# Patient Record
Sex: FEMALE | Race: OTHER | HISPANIC OR LATINO | Employment: STUDENT | URBAN - METROPOLITAN AREA
[De-identification: names, ages, dates, MRNs, and addresses within clinical notes are randomized per-mention and may not be internally consistent; named-entity substitution may affect disease eponyms.]

---
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
## 2021-01-12 DIAGNOSIS — Z30.41 ENCOUNTER FOR SURVEILLANCE OF CONTRACEPTIVE PILLS: Primary | ICD-10-CM

## 2021-01-12 RX ORDER — NORGESTIMATE AND ETHINYL ESTRADIOL 0.25-0.035
1 KIT ORAL DAILY
Qty: 28 TABLET | Refills: 11 | Status: SHIPPED | OUTPATIENT
Start: 2021-01-12 | End: 2021-01-27 | Stop reason: SDUPTHER

## 2021-01-27 ENCOUNTER — OFFICE VISIT (OUTPATIENT)
Dept: OBGYN CLINIC | Facility: CLINIC | Age: 21
End: 2021-01-27

## 2021-01-27 VITALS
WEIGHT: 117 LBS | BODY MASS INDEX: 20.73 KG/M2 | HEART RATE: 76 BPM | SYSTOLIC BLOOD PRESSURE: 112 MMHG | DIASTOLIC BLOOD PRESSURE: 73 MMHG

## 2021-01-27 DIAGNOSIS — Z30.41 ENCOUNTER FOR SURVEILLANCE OF CONTRACEPTIVE PILLS: Primary | ICD-10-CM

## 2021-01-27 DIAGNOSIS — Z11.3 SCREEN FOR STD (SEXUALLY TRANSMITTED DISEASE): ICD-10-CM

## 2021-01-27 DIAGNOSIS — Z30.09 GENERAL COUNSELING AND ADVICE ON FEMALE CONTRACEPTION: ICD-10-CM

## 2021-01-27 PROCEDURE — 87491 CHLMYD TRACH DNA AMP PROBE: CPT | Performed by: NURSE PRACTITIONER

## 2021-01-27 PROCEDURE — 99213 OFFICE O/P EST LOW 20 MIN: CPT | Performed by: NURSE PRACTITIONER

## 2021-01-27 PROCEDURE — 87591 N.GONORRHOEAE DNA AMP PROB: CPT | Performed by: NURSE PRACTITIONER

## 2021-01-27 RX ORDER — NORGESTIMATE AND ETHINYL ESTRADIOL 0.25-0.035
1 KIT ORAL DAILY
Qty: 28 TABLET | Refills: 11 | Status: SHIPPED | OUTPATIENT
Start: 2021-01-27 | End: 2021-10-01 | Stop reason: SDUPTHER

## 2021-01-27 NOTE — PROGRESS NOTES
Assessment/Plan:         Diagnoses and all orders for this visit:    Encounter for surveillance of contraceptive pills  -     norgestimate-ethinyl estradiol (ORTHO-CYCLEN) 0 25-35 MG-MCG per tablet; Take 1 tablet by mouth daily    General counseling and advice on female contraception    Screen for STD (sexually transmitted disease)  -     Chlamydia/GC amplified DNA by PCR      Plan  Continue birth control pills as previously directed  STD results can take up to 2 weeks  Remember safe sex and condom use  Return in 1 year  Pt verbalized understanding of all discussed  Subjective:      Patient ID: Ketan Rush is a 24 y o  female  HPI   Pt presents to continue OCP's, happy with period, < 1 week, denies pain  1 partner x 3 years  Denies H/A      The following portions of the patient's history were reviewed and updated as appropriate: allergies, current medications, past family history, past medical history, past social history, past surgical history and problem list     Review of Systems      Pertinent items are note in the HPI    Objective:      /73   Pulse 76   Wt 53 1 kg (117 lb)   LMP 01/18/2021 (Within Days)   BMI 20 73 kg/m²          Physical Exam  Vitals signs reviewed  Constitutional:       Appearance: Normal appearance  Eyes:      General:         Right eye: No discharge  Left eye: No discharge  Neck:      Musculoskeletal: Normal range of motion  Pulmonary:      Effort: Pulmonary effort is normal  No respiratory distress  Musculoskeletal: Normal range of motion  Neurological:      Mental Status: She is alert and oriented to person, place, and time  Psychiatric:         Mood and Affect: Mood normal          Behavior: Behavior normal          Thought Content:  Thought content normal        Negative cough or SOB

## 2021-01-27 NOTE — LETTER
2021    To Rosanna Mcdaniel  : 2000      This letter is to advise you that your recent CULTURE results were reviewed by me and are NORMAL  Please contact the office for an appointment if you have any additional concerns      SIL Avilez

## 2021-01-27 NOTE — PATIENT INSTRUCTIONS
Continue birth control pills as previously directed  STD results can take up to 2 weeks  Remember safe sex and condom use  Return in 1 year    Birth Control Pills   WHAT YOU NEED TO KNOW:   Birth control pills are also called oral contraceptives, or the pill  It is medicine that helps prevent pregnancy  Birth control pills work by preventing ovulation  Ovulation is when the ovaries make and release an egg cell each month  If this egg gets fertilized by sperm, pregnancy occurs  Birth control pills may also help to prevent pregnancy by keeping sperm from fertilizing an egg  DISCHARGE INSTRUCTIONS:   Follow up with your healthcare provider as directed:  Write down your questions so you remember to ask them during your visits  Advantages of birth control pills:  When birth control pills are used correctly, the chances of getting pregnant are very low  Birth control pills may help decrease bleeding and pain during your monthly period  They may also help prevent cancer of the uterus and ovaries  Disadvantages of birth control pills: You may have sudden changes in your mood or feelings while you take birth control pills  You may have nausea and decreased sex drive  You may have an increased appetite and rapid weight gain  You may also have bleeding in between periods, less frequent periods, vaginal dryness, and breast pain  Birth control pills will not protect you from sexually transmitted infections  Rarely, some birth control pills can increase your risk for a blood clot  This may become life-threatening  If you want to get pregnant: If you are planning to have a baby, ask your healthcare provider when you may stop taking your birth control pills  It may take some time for you to start ovulating again  Ask your healthcare provider for more information about pregnancy after birth control pills  When to start taking birth control pills after you have a baby:   If you are not breastfeeding, you may start taking birth control pills 3 weeks after you give birth  You may be able to take certain types of birth control pills if you are breastfeeding  These pills can be started from 6 weeks to 6 months after you give birth  Ask your healthcare provider for more information about when to start taking birth control pills after you give birth  Contact your healthcare provider if:   · You have forgotten to take a birth control pill  · You have mood changes, such as depression, since starting birth control pills  · You have nausea or you are vomiting  · You have severe abdominal pain  · You missed a period and have questions or concerns about being pregnant  · You still have bleeding 4 months after taking birth control pills correctly  · You have questions or concerns about your condition or care  Return to the emergency department if:   · Your arm or leg feels warm, tender, and painful  It may look swollen and red  · You feel lightheaded, short of breath, and have chest pain  · You cough up blood  · You have any of the following signs of a stroke:      ¨ Numbness or drooping on one side of your face     ¨ Weakness in an arm or leg    ¨ Confusion or difficulty speaking    ¨ Dizziness, a severe headache, or vision loss    · You have severe pain, numbness, or swelling in your arms or legs  © 2017 [facility] Information is for End User's use only and may not be sold, redistributed or otherwise used for commercial purposes  All illustrations and images included in CareNotes® are the copyrighted property of A D A M , Inc  or Remington Hernandez  The above information is an  only  It is not intended as medical advice for individual conditions or treatments  Talk to your doctor, nurse or pharmacist before following any medical regimen to see if it is safe and effective for you  Missed or Late Pills:  For combined (Estrogen and Progestin) pills:    If one hormonal pill is LATE (<24 hours since a pill should have been taken): Take the late or missed pill as soon as possible  Continue taking the remaining pills at the usual time (even if it means taking two pills on the same day)  No additional contraceptive protection is needed  Emergency contraception is not usually needed but can be considered if hormonal pills were missed earlier in the cycle or in the last week of the previous cycle  If one hormonal pill has been MISSED (24 to <48 hours since a pill should have been taken): Take the late or missed pill as soon as possible  Continue taking the remaining pills at the usual time (even if it means taking two pills on the same day)  No additional contraceptive protection is needed  Emergency contraception is not usually needed but can be considered if hormonal pills were missed earlier in the cycle or in the last week of the previous cycle  If two or more consecutive hormonal pills have been missed: (less than or equal to 48 hours since a pill should have been taken): Take the most recent missed pill as soon as possible  (Any other missed pills should be discarded ) Continue taking the remaining pills at the usual time (even if it means taking two pills on the same day)  Use back-up contraception (e g , condoms) or avoid sexual intercourse until hormonal pills have been taken for 7 consecutive days  If pills were missed in the last week of hormonal pills (e g , days 15-21 for 28-day pill packs): Omit the hormone-free interval by finishing the horomal pills in the current pack and starting a new pack the next day  If unable to start a new pack immediately, use back-up contraception (e g , condoms) or avoid sexual intercourse until hormonal pills from a new pack have been taken for 7 consecutive days  Emergency contraception should be considered if hormonal pills were missed during the first week and unprotected sexual intercourse occurred in the previous 5 days   Emergency contraception may also be considered at other times as appropriate  Source: [place]  Selected Practice Recommendations for Contraceptive Use, 2013  COVID-19 Instructions    If you are having any of the following:  Cough   Shortness of breath   Fever  If traveled within past 2 weeks internationally or to high risk [place] states  Or been in contact with someone that has     Please call either:   Your PCP office  [phone], option [phone]    They will screen you over the phone and direct you to the nearest appropriate testing location    DO NOT go to your PCP or OB office without calling first

## 2021-01-28 LAB
C TRACH DNA SPEC QL NAA+PROBE: NEGATIVE
N GONORRHOEA DNA SPEC QL NAA+PROBE: NEGATIVE

## 2021-02-23 ENCOUNTER — OFFICE VISIT (OUTPATIENT)
Dept: FAMILY MEDICINE CLINIC | Facility: CLINIC | Age: 21
End: 2021-02-23

## 2021-02-23 VITALS
RESPIRATION RATE: 18 BRPM | BODY MASS INDEX: 21.25 KG/M2 | DIASTOLIC BLOOD PRESSURE: 70 MMHG | HEIGHT: 62 IN | OXYGEN SATURATION: 99 % | HEART RATE: 91 BPM | SYSTOLIC BLOOD PRESSURE: 102 MMHG | TEMPERATURE: 97.3 F | WEIGHT: 115.5 LBS

## 2021-02-23 DIAGNOSIS — Z23 ENCOUNTER FOR IMMUNIZATION: ICD-10-CM

## 2021-02-23 DIAGNOSIS — F41.9 ANXIETY: ICD-10-CM

## 2021-02-23 DIAGNOSIS — G43.909 MIGRAINE WITHOUT STATUS MIGRAINOSUS, NOT INTRACTABLE, UNSPECIFIED MIGRAINE TYPE: Primary | ICD-10-CM

## 2021-02-23 DIAGNOSIS — Z00.00 HEALTHCARE MAINTENANCE: ICD-10-CM

## 2021-02-23 DIAGNOSIS — Z00.00 ANNUAL PHYSICAL EXAM: ICD-10-CM

## 2021-02-23 DIAGNOSIS — R62.7 POOR WEIGHT GAIN IN ADULT: ICD-10-CM

## 2021-02-23 PROBLEM — R51.9 HEADACHE: Status: ACTIVE | Noted: 2020-03-25

## 2021-02-23 PROCEDURE — 90471 IMMUNIZATION ADMIN: CPT

## 2021-02-23 PROCEDURE — 3008F BODY MASS INDEX DOCD: CPT | Performed by: NURSE PRACTITIONER

## 2021-02-23 PROCEDURE — 99385 PREV VISIT NEW AGE 18-39: CPT | Performed by: NURSE PRACTITIONER

## 2021-02-23 PROCEDURE — 1036F TOBACCO NON-USER: CPT | Performed by: NURSE PRACTITIONER

## 2021-02-23 PROCEDURE — 90632 HEPA VACCINE ADULT IM: CPT

## 2021-02-23 RX ORDER — MIRTAZAPINE 7.5 MG/1
7.5 TABLET, FILM COATED ORAL
Qty: 30 TABLET | Refills: 5 | Status: SHIPPED | OUTPATIENT
Start: 2021-02-23 | End: 2021-09-01

## 2021-02-23 NOTE — PATIENT INSTRUCTIONS
Wellness Visit for Adults   AMBULATORY CARE:   A wellness visit  is when you see your healthcare provider to get screened for health problems  Your healthcare provider will also give you advice on how to stay healthy  Write down your questions so you remember to ask them  Ask your healthcare provider how often you should have a wellness visit  What happens at a wellness visit:  Your healthcare provider will ask about your health, and your family history of health problems  This includes high blood pressure, heart disease, and cancer  He or she will ask if you have symptoms that concern you, if you smoke, and about your mood  You may also be asked about your intake of medicines, supplements, food, and alcohol  Any of the following may be done:  · Your weight  will be checked  Your height may also be checked so your body mass index (BMI) can be calculated  Your BMI shows if you are at a healthy weight  · Your blood pressure  and heart rate will be checked  Your temperature may also be checked  · Blood and urine tests  may be done  Blood tests may be done to check your cholesterol levels  Abnormal cholesterol levels increase your risk for heart disease and stroke  You may also need a blood or urine test to check for diabetes if you are at increased risk  Urine tests may be done to look for signs of an infection or kidney disease  · A physical exam  includes checking your heartbeat and lungs with a stethoscope  Your healthcare provider may also check your skin to look for sun damage  · Screening tests  may be recommended  A screening test is done to check for diseases that may not cause symptoms  The screening tests you may need depend on your age, gender, family history, and lifestyle habits  For example, colorectal screening may be recommended if you are 48years old or older  Screening tests you need if you are a woman:   · A Pap smear  is used to screen for cervical cancer   Pap smears are usually done every 3 to 5 years depending on your age  You may need them more often if you have had abnormal Pap smear test results in the past  Ask your healthcare provider how often you should have a Pap smear  · A mammogram  is an x-ray of your breasts to screen for breast cancer  Experts recommend mammograms every 2 years starting at age 48 years  You may need a mammogram at age 52 years or younger if you have an increased risk for breast cancer  Talk to your healthcare provider about when you should start having mammograms and how often you need them  Vaccines you may need:   · Get an influenza vaccine  every year  The influenza vaccine protects you from the flu  Several types of viruses cause the flu  The viruses change over time, so new vaccines are made each year  · Get a tetanus-diphtheria (Td) booster vaccine  every 10 years  This vaccine protects you against tetanus and diphtheria  Tetanus is a severe infection that may cause painful muscle spasms and lockjaw  Diphtheria is a severe bacterial infection that causes a thick covering in the back of your mouth and throat  · Get a human papillomavirus (HPV) vaccine  if you are female and aged 23 to 32 or male 23 to 24 and never received it  This vaccine protects you from HPV infection  HPV is the most common infection spread by sexual contact  HPV may also cause vaginal, penile, and anal cancers  · Get a pneumococcal vaccine  if you are aged 72 years or older  The pneumococcal vaccine is an injection given to protect you from pneumococcal disease  Pneumococcal disease is an infection caused by pneumococcal bacteria  The infection may cause pneumonia, meningitis, or an ear infection  · Get a shingles vaccine  if you are 60 or older, even if you have had shingles before  The shingles vaccine is an injection to protect you from the varicella-zoster virus  This is the same virus that causes chickenpox   Shingles is a painful rash that develops in people who had chickenpox or have been exposed to the virus  How to eat healthy:  My Plate is a model for planning healthy meals  It shows the types and amounts of foods that should go on your plate  Fruits and vegetables make up about half of your plate, and grains and protein make up the other half  A serving of dairy is included on the side of your plate  The amount of calories and serving sizes you need depends on your age, gender, weight, and height  Examples of healthy foods are listed below:  · Eat a variety of vegetables  such as dark green, red, and orange vegetables  You can also include canned vegetables low in sodium (salt) and frozen vegetables without added butter or sauces  · Eat a variety of fresh fruits , canned fruit in 100% juice, frozen fruit, and dried fruit  · Include whole grains  At least half of the grains you eat should be whole grains  Examples include whole-wheat bread, wheat pasta, brown rice, and whole-grain cereals such as oatmeal     · Eat a variety of protein foods such as seafood (fish and shellfish), lean meat, and poultry without skin (turkey and chicken)  Examples of lean meats include pork leg, shoulder, or tenderloin, and beef round, sirloin, tenderloin, and extra lean ground beef  Other protein foods include eggs and egg substitutes, beans, peas, soy products, nuts, and seeds  · Choose low-fat dairy products such as skim or 1% milk or low-fat yogurt, cheese, and cottage cheese  · Limit unhealthy fats  such as butter, hard margarine, and shortening  Exercise:  Exercise at least 30 minutes per day on most days of the week  Some examples of exercise include walking, biking, dancing, and swimming  You can also fit in more physical activity by taking the stairs instead of the elevator or parking farther away from stores  Include muscle strengthening activities 2 days each week  Regular exercise provides many health benefits   It helps you manage your weight, and decreases your risk for type 2 diabetes, heart disease, stroke, and high blood pressure  Exercise can also help improve your mood  Ask your healthcare provider about the best exercise plan for you  General health and safety guidelines:   · Do not smoke  Nicotine and other chemicals in cigarettes and cigars can cause lung damage  Ask your healthcare provider for information if you currently smoke and need help to quit  E-cigarettes or smokeless tobacco still contain nicotine  Talk to your healthcare provider before you use these products  · Limit alcohol  A drink of alcohol is 12 ounces of beer, 5 ounces of wine, or 1½ ounces of liquor  · Lose weight, if needed  Being overweight increases your risk of certain health conditions  These include heart disease, high blood pressure, type 2 diabetes, and certain types of cancer  · Protect your skin  Do not sunbathe or use tanning beds  Use sunscreen with a SPF 15 or higher  Apply sunscreen at least 15 minutes before you go outside  Reapply sunscreen every 2 hours  Wear protective clothing, hats, and sunglasses when you are outside  · Drive safely  Always wear your seatbelt  Make sure everyone in your car wears a seatbelt  A seatbelt can save your life if you are in an accident  Do not use your cell phone when you are driving  This could distract you and cause an accident  Pull over if you need to make a call or send a text message  · Practice safe sex  Use latex condoms if are sexually active and have more than one partner  Your healthcare provider may recommend screening tests for sexually transmitted infections (STIs)  · Wear helmets, lifejackets, and protective gear  Always wear a helmet when you ride a bike or motorcycle, go skiing, or play sports that could cause a head injury  Wear protective equipment when you play sports  Wear a lifejacket when you are on a boat or doing water sports      © Copyright [employer] 2020 Information is for End User's use only and may not be sold, redistributed or otherwise used for commercial purposes  All illustrations and images included in CareNotes® are the copyrighted property of A D A M , Inc  or Cali Dawkins  The above information is an  only  It is not intended as medical advice for individual conditions or treatments  Talk to your doctor, nurse or pharmacist before following any medical regimen to see if it is safe and effective for you  Acute Headache   WHAT YOU NEED TO KNOW:   An acute headache is pain or discomfort that starts suddenly and gets worse quickly  You may have an acute headache only when you feel stress or eat certain foods  Other acute headache pain can happen every day, and sometimes several times a day  DISCHARGE INSTRUCTIONS:   Return to the emergency department if:   · You have severe pain  · You have numbness or weakness on one side of your face or body  · You have a headache that occurs after a blow to the head, a fall, or other trauma  · You have a headache, are forgetful or confused, or have trouble speaking  · You have a headache, stiff neck, and a fever  Contact your healthcare provider if:   · You have a constant headache and are vomiting  · You have a headache each day that does not get better, even after treatment  · You have changes in your headaches, or new symptoms that occur when you have a headache  · You have questions or concerns about your condition or care  Medicines: You may need any of the following:  · Prescription pain medicine  may be given  The medicine your healthcare provider recommends will depend on the kind of headaches you have  You will need to take prescription headache medicines as directed to prevent a problem called rebound headache  These headaches happen with regular use of pain relievers for headache disorders  · NSAIDs , such as ibuprofen, help decrease swelling, pain, and fever   This medicine is available with or without a doctor's order  NSAIDs can cause stomach bleeding or kidney problems in certain people  If you take blood thinner medicine, always ask your healthcare provider if NSAIDs are safe for you  Always read the medicine label and follow directions  · Acetaminophen  decreases pain and fever  It is available without a doctor's order  Ask how much to take and how often to take it  Follow directions  Read the labels of all other medicines you are using to see if they also contain acetaminophen, or ask your doctor or pharmacist  Acetaminophen can cause liver damage if not taken correctly  Do not use more than 3 grams (3,000 milligrams) total of acetaminophen in one day  · Antidepressants  may be given for some kinds of headaches  · Take your medicine as directed  Contact your healthcare provider if you think your medicine is not helping or if you have side effects  Tell him or her if you are allergic to any medicine  Keep a list of the medicines, vitamins, and herbs you take  Include the amounts, and when and why you take them  Bring the list or the pill bottles to follow-up visits  Carry your medicine list with you in case of an emergency  Manage your symptoms:   · Apply heat or ice  on the headache area  Use a heat or ice pack  For an ice pack, you can also put crushed ice in a plastic bag  Cover the pack or bag with a towel before you apply it to your skin  Ice and heat both help decrease pain, and heat also helps decrease muscle spasms  Apply heat for 20 to 30 minutes every 2 hours  Apply ice for 15 to 20 minutes every hour  Apply heat or ice for as long and for as many days as directed  You may alternate heat and ice  · Relax your muscles  Lie down in a comfortable position and close your eyes  Relax your muscles slowly  Start at your toes and work your way up your body  · Keep a record of your headaches  Write down when your headaches start and stop   Include your symptoms and what you were doing when the headache began  Record what you ate or drank for 24 hours before the headache started  Describe the pain and where it hurts  Keep track of what you did to treat your headache and if it worked  Prevent an acute headache:   · Avoid anything that triggers an acute headache  Examples include exposure to chemicals, going to high altitude, or not getting enough sleep  Create a regular sleep routine  Go to sleep at the same time and wake up at the same time each day  Do not use electronic devices before bedtime  These may trigger a headache or prevent you from sleeping well  · Do not smoke  Nicotine and other chemicals in cigarettes and cigars can trigger an acute headache or make it worse  Ask your healthcare provider for information if you currently smoke and need help to quit  E-cigarettes or smokeless tobacco still contain nicotine  Talk to your healthcare provider before you use these products  · Limit alcohol as directed  Alcohol can trigger an acute headache or make it worse  If you have cluster headaches, do not drink alcohol during an episode  For other types of headaches, ask your healthcare provider if it is safe for you to drink alcohol  Ask how much is safe for you to drink, and how often  · Exercise as directed  Exercise can reduce tension and help with headache pain  Aim for 30 minutes of physical activity on most days of the week  Your healthcare provider can help you create an exercise plan  · Eat a variety of healthy foods  Healthy foods include fruits, vegetables, low-fat dairy products, lean meats, fish, whole grains, and cooked beans  Your healthcare provider or dietitian can help you create meals plans if you need to avoid foods that trigger headaches  Follow up with your healthcare provider as directed:  Bring your headache record with you when you see your healthcare provider   Write down your questions so you remember to ask them during your visits  © Copyright [address] Information is for End User's use only and may not be sold, redistributed or otherwise used for commercial purposes  All illustrations and images included in CareNotes® are the copyrighted property of A D A M [employer] Inc  or Cali Dawkins  The above information is an  only  It is not intended as medical advice for individual conditions or treatments  Talk to your doctor, nurse or pharmacist before following any medical regimen to see if it is safe and effective for you

## 2021-02-23 NOTE — ASSESSMENT & PLAN NOTE
Stable at this time per patient but periods of anxiety have reemerged with fear of nightly headaches  Mirtazapine for headaches co-therapeutic for anxiety  Will f/u in 4 weeks and reassess

## 2021-02-23 NOTE — ASSESSMENT & PLAN NOTE
Unilateral, no aura, periods of remission and pain 10/10 all seem to point to cluster headaches  But migraine and tension type headaches cannot be ruled out at this time  Trial of second line Mirtazapine  If fails, referral to Neuro and may consider MRI

## 2021-02-23 NOTE — PROGRESS NOTES
ADULT ANNUAL PHYSICAL  [facility]    NAME: Tio Alba  AGE: 24 y o  SEX: female  : 2000     DATE: 2021     Assessment and Plan:     Problem List Items Addressed This Visit        Other    Anxiety     Stable at this time per patient but periods of anxiety have reemerged with fear of nightly headaches  Mirtazapine for headaches co-therapeutic for anxiety  Will f/u in 4 weeks and reassess  Headache - Primary     Unilateral, no aura, periods of remission and pain 10/10 all seem to point to cluster headaches  But migraine and tension type headaches cannot be ruled out at this time  Trial of second line Mirtazapine  If fails, referral to Neuro and may consider MRI  Relevant Medications    mirtazapine (REMERON) 7 5 MG tablet    Poor weight gain in adult     Pt states very active metabolism but irritable bowel  Declines referral to GI at this time but prefers to work with nutritionist on diet  Denies syncope, intentional anorexia, fam hist of thyroid disease  Check labs for anemia and TSH  Relevant Orders    Ambulatory referral to Nutrition Services      Other Visit Diagnoses     Encounter for immunization        Relevant Orders    HEPATITIS A VACCINE ADULT IM (Completed)    Healthcare maintenance        Relevant Orders    Basic metabolic panel    CBC and Platelet    Vitamin D 25 hydroxy    TSH, 3rd generation with Free T4 reflex    Annual physical exam              Immunizations and preventive care screenings were discussed with patient today  Appropriate education was printed on patient's after visit summary  Counseling:  Alcohol/drug use: discussed moderation in alcohol intake, the recommendations for healthy alcohol use, and avoidance of illicit drug use  Dental Health: discussed importance of regular tooth brushing, flossing, and dental visits    Injury prevention: discussed safety/seat belts, safety helmets, smoke detectors, carbon dioxide detectors, and smoking near bedding or upholstery  Sexual health: discussed sexually transmitted diseases, partner selection, use of condoms, avoidance of unintended pregnancy, and contraceptive alternatives  · Exercise: the importance of regular exercise/physical activity was discussed  Recommend exercise 3-5 times per week for at least 30 minutes  Return in about 4 weeks (around 3/23/2021) for Recheck headaches  Chief Complaint:     Chief Complaint   Patient presents with    New Patient Visit      History of Present Illness:     Adult Annual Physical   Patient here for a comprehensive physical exam And to establish as a new patient at our practice  Actually follows with OBGYN at [facility] upstairs and currently takes oral contraceptives       Her chief complaint is chronic headaches  She describes these as unilateral   Denies aura, visual disturbances, nausea, vomiting, or syncope  On a scale of 0-10 the pain is a 10  She was on amitriptyline but has not taken this for over 1 year as she states this did not help  Last March she received the 1st in a 2 dose series of hep a and she would like to complete the series today  Her depression screening is negative  Her other complaint is inability to gain weight  She denies family history of thyroid disorder  She denies heat intolerance  She states she has a healthy appetite however she believes she may have IBS or lactose intolerance  She does states she has a history of anxiety but does not currently take medication or receive psychotherapy  She has no other complaints at this time  Diet and Physical Activity  · Diet/Nutrition: well balanced diet  · Exercise: no formal exercise  Depression Screening  PHQ-9 Depression Screening    PHQ-9:   Frequency of the following problems over the past two weeks:           General Health  · Sleep: sleeps well     · Hearing: normal - bilateral   · Vision: no vision problems and wears glasses  · Dental: regular dental visits  /GYN Health  · Contraceptive method: oral contraceptives  · History of STDs?: yes  Review of Systems:     Review of Systems   Constitutional: Negative for appetite change, chills, fever and unexpected weight change  HENT: Negative for congestion, dental problem, ear pain, mouth sores, nosebleeds, postnasal drip, rhinorrhea, sore throat and trouble swallowing  Eyes: Negative for pain and visual disturbance  Respiratory: Negative for cough and shortness of breath  Cardiovascular: Negative for chest pain and palpitations  Gastrointestinal: Negative for abdominal pain, blood in stool, constipation, diarrhea, nausea and vomiting  Genitourinary: Negative for dysuria and hematuria  Musculoskeletal: Negative for arthralgias and back pain  Skin: Negative for color change and rash  Neurological: Positive for headaches  Negative for dizziness, seizures, syncope, facial asymmetry and light-headedness  Psychiatric/Behavioral: The patient is nervous/anxious  All other systems reviewed and are negative  Past Medical History:     Past Medical History:   Diagnosis Date    ADHD (attention deficit hyperactivity disorder)     last seen 1 year ago, no recent RX, was on adderal, off at least 1 year     Migraine     OCD (obsessive compulsive disorder)     no treatment      Past Surgical History:     History reviewed  No pertinent surgical history     Social History:        Social History     Socioeconomic History    Marital status: Single     Spouse name: None    Number of children: None    Years of education: None    Highest education level: None   Occupational History    None   Social Needs    Financial resource strain: Not hard at all   Rafita-Stew insecurity     Worry: Never true     Inability: Never true    Transportation needs     Medical: No     Non-medical: No   Tobacco Use    Smoking status: Never Smoker    Smokeless tobacco: Never Used   Substance and Sexual Activity    Alcohol use: No    Drug use: Not Currently     Types: Marijuana    Sexual activity: Yes     Birth control/protection: Pill     Comment: patient's cell [phone]; lives with Mom, may stay at boyfriends house on [place]  Lifestyle    Physical activity     Days per week: None     Minutes per session: None    Stress: None   Relationships    Social connections     Talks on phone: None     Gets together: None     Attends Buddhism service: None     Active member of club or organization: None     Attends meetings of clubs or organizations: None     Relationship status: None    Intimate partner violence     Fear of current or ex partner: None     Emotionally abused: None     Physically abused: None     Forced sexual activity: None   Other Topics Concern    None   Social History Narrative    Lives with mother and sister  Family History:     Family History   Problem Relation Age of Onset   Vy Aldrich Migraines Mother    Vy Aldrich ADD / ADHD Father     Heart disease Maternal Grandmother     Diabetes Maternal Grandmother       Current Medications:     Current Outpatient Medications   Medication Sig Dispense Refill    norgestimate-ethinyl estradiol (ORTHO-CYCLEN) 0 25-35 MG-MCG per tablet Take 1 tablet by mouth daily 28 tablet 11    mirtazapine (REMERON) 7 5 MG tablet Take 1 tablet (7 5 mg total) by mouth daily at bedtime 30 tablet 5     No current facility-administered medications for this visit  Allergies:     No Known Allergies   Physical Exam:     /70 (BP Location: Left arm, Patient Position: Sitting, Cuff Size: Standard)   Pulse 91   Temp (!) 97 3 °F (36 3 °C) (Temporal)   Resp 18   Ht 5' 2" (1 575 m)   Wt 52 4 kg (115 lb 8 oz)   LMP 02/18/2021   SpO2 99%   Breastfeeding No   BMI 21 13 kg/m²     Physical Exam  Vitals signs and nursing note reviewed     Constitutional:       General: She is not in acute distress  Appearance: Normal appearance  She is well-developed and normal weight  HENT:      Head: Normocephalic and atraumatic  Right Ear: Tympanic membrane, ear canal and external ear normal  There is no impacted cerumen  Left Ear: Tympanic membrane, ear canal and external ear normal  There is no impacted cerumen  Nose: Nose normal  No congestion  Mouth/Throat:      Mouth: Mucous membranes are moist       Pharynx: No posterior oropharyngeal erythema  Eyes:      Extraocular Movements: Extraocular movements intact  Conjunctiva/sclera: Conjunctivae normal       Pupils: Pupils are equal, round, and reactive to light  Neck:      Musculoskeletal: Normal range of motion and neck supple  Cardiovascular:      Rate and Rhythm: Normal rate and regular rhythm  Pulses: Normal pulses  Heart sounds: Normal heart sounds  No murmur  Pulmonary:      Effort: Pulmonary effort is normal  No respiratory distress  Breath sounds: Normal breath sounds  Abdominal:      General: Abdomen is flat  Bowel sounds are normal       Palpations: Abdomen is soft  Tenderness: There is no abdominal tenderness  Musculoskeletal: Normal range of motion  Skin:     General: Skin is warm and dry  Neurological:      General: No focal deficit present  Mental Status: She is alert and oriented to person, place, and time  Psychiatric:         Mood and Affect: Mood normal          Behavior: Behavior normal          Thought Content:  Thought content normal           Damari Woo 35

## 2021-02-23 NOTE — ASSESSMENT & PLAN NOTE
Pt states very active metabolism but irritable bowel  Declines referral to GI at this time but prefers to work with nutritionist on diet  Denies syncope, intentional anorexia, fam hist of thyroid disease  Check labs for anemia and TSH

## 2021-04-27 ENCOUNTER — IMMUNIZATIONS (OUTPATIENT)
Dept: FAMILY MEDICINE CLINIC | Facility: HOSPITAL | Age: 21
End: 2021-04-27

## 2021-04-27 DIAGNOSIS — Z23 ENCOUNTER FOR IMMUNIZATION: Primary | ICD-10-CM

## 2021-04-27 PROCEDURE — 0011A SARS-COV-2 / COVID-19 MRNA VACCINE (MODERNA) 100 MCG: CPT

## 2021-04-27 PROCEDURE — 91301 SARS-COV-2 / COVID-19 MRNA VACCINE (MODERNA) 100 MCG: CPT

## 2021-05-25 ENCOUNTER — IMMUNIZATIONS (OUTPATIENT)
Dept: FAMILY MEDICINE CLINIC | Facility: HOSPITAL | Age: 21
End: 2021-05-25

## 2021-05-25 DIAGNOSIS — Z23 ENCOUNTER FOR IMMUNIZATION: Primary | ICD-10-CM

## 2021-05-25 PROCEDURE — 0012A SARS-COV-2 / COVID-19 MRNA VACCINE (MODERNA) 100 MCG: CPT

## 2021-05-25 PROCEDURE — 91301 SARS-COV-2 / COVID-19 MRNA VACCINE (MODERNA) 100 MCG: CPT

## 2021-08-20 ENCOUNTER — OFFICE VISIT (OUTPATIENT)
Dept: FAMILY MEDICINE CLINIC | Facility: CLINIC | Age: 21
End: 2021-08-20

## 2021-08-20 ENCOUNTER — APPOINTMENT (OUTPATIENT)
Dept: LAB | Facility: HOSPITAL | Age: 21
End: 2021-08-20
Payer: COMMERCIAL

## 2021-08-20 DIAGNOSIS — Z20.822 EXPOSURE TO COVID-19 VIRUS: Primary | ICD-10-CM

## 2021-08-20 PROCEDURE — 99212 OFFICE O/P EST SF 10 MIN: CPT | Performed by: FAMILY MEDICINE

## 2021-08-20 NOTE — PROGRESS NOTES
COVID-19 Outpatient Progress Note    Assessment/Plan:    Problem List Items Addressed This Visit        Other    Exposure to COVID-19 virus - Primary     -sent for testing due to exposure to positive contact at work  -asymptomatic so far  -notify If any symptoms         Relevant Orders    COVID19, Influenza A/B, RSV PCR, [facility] - Collected at   [facility] or [facility]         Disposition:     I referred patient to one of our centralized sites for a COVID-19 swab  I have spent 15 minutes directly with the patient  Greater than 50% of this time was spent in counseling/coordination of care regarding: instructions for management and impressions  Verification of patient location:Current address:  [place] [address] [address]    Patient is located in the following [facility] in which I hold an active license [place]    Encounter provider Camilo Vann MD    Provider located at [place] [address] [address] [id number]  [phone]    Recent Visits  No visits were found meeting these conditions  Showing recent visits within past 7 days and meeting all other requirements  Today's Visits  Date Type Provider Dept   08/20/21 Office Visit Camilo Vann MD  [facility]   Showing today's visits and meeting all other requirements  Future Appointments  No visits were found meeting these conditions  Showing future appointments within next 150 days and meeting all other requirements     This virtual check-in was done via telephone and she agrees to proceed  Patient agrees to participate in a virtual check in via telephone or video visit instead of presenting to the office to address urgent/immediate medical needs  Patient is aware this is a billable service  After connecting through Telephone, the patient was identified by name and date of birth   Joe Georgia was informed that this was a telemedicine visit and that the exam was being conducted confidentially over secure lines  My office door was closed  No one else was in the room  Jarek Gregory acknowledged consent and understanding of privacy and security of the telemedicine visit  I informed the patient that I have reviewed her record in Epic and presented the opportunity for her to ask any questions regarding the visit today  The patient agreed to participate  It was my intent to perform this visit via video technology but the patient was not able to do a video connection so the visit was completed via audio telephone only  Subjective:   Jarek Gregory is a 22 y o  female who is concerned about COVID-19  Patient is currently asymptomatic  Patient denies fever, chills, fatigue, malaise, congestion, rhinorrhea, sore throat, anosmia, loss of taste, cough, shortness of breath, chest tightness, abdominal pain, nausea, vomiting, diarrhea, myalgias and headaches       COVID-19 vaccination status: Fully vaccinated    Exposure:   Contact with a person who is under investigation (PUI) for or who is positive for COVID-19 within the last 14 days?: Yes    Hospitalized recently for fever and/or lower respiratory symptoms?: No      Currently a healthcare worker that is involved in direct patient care?: No      Works in a special setting where the risk of COVID-19 transmission may be high? (this may include long-term care, correctional and long-term facilities; homeless shelters; assisted-living facilities and group homes ): No      Resident in a special setting where the risk of COVID-19 transmission may be high? (this may include long-term care, correctional and long-term facilities; homeless shelters; assisted-living facilities and group homes ): No      -Sent early from work due to exposure at work to covid positive patient  -no symptoms so far  -went to work today AM--sent home due to exposure- needs covid test to return to work   -sent for testing , call [facility], ask for details on testing, results, etc         No results found for: Ramos Claudio, [address] [facility] [address], [address] [facility], [place] [address]  Past Medical History:   Diagnosis Date    ADHD (attention deficit hyperactivity disorder)     last seen 1 year ago, no recent RX, was on adderal, off at least 1 year     Migraine     OCD (obsessive compulsive disorder)     no treatment     No past surgical history on file  Current Outpatient Medications   Medication Sig Dispense Refill    mirtazapine (REMERON) 7 5 MG tablet Take 1 tablet (7 5 mg total) by mouth daily at bedtime 30 tablet 5    norgestimate-ethinyl estradiol (ORTHO-CYCLEN) 0 25-35 MG-MCG per tablet Take 1 tablet by mouth daily 28 tablet 11     No current facility-administered medications for this visit  No Known Allergies    Review of Systems   Constitutional: Negative for chills, fatigue and fever  HENT: Negative for congestion, rhinorrhea and sore throat  Respiratory: Negative for cough, chest tightness and shortness of breath  Gastrointestinal: Negative for abdominal pain, diarrhea, nausea and vomiting  Musculoskeletal: Negative for myalgias  Neurological: Negative for headaches  Objective: There were no vitals filed for this visit  VIRTUAL VISIT [address] verbally agrees to participate in [facility]  Pt is aware that [facility] could be limited without vital signs or the ability to perform a full hands-on physical Ana Paula Masker understands she or the provider may request at any time to terminate the video visit and request the patient to seek care or treatment in person

## 2021-08-21 NOTE — ASSESSMENT & PLAN NOTE
-sent for testing due to exposure to positive contact at work  -asymptomatic so far  -notify If any symptoms

## 2021-09-01 DIAGNOSIS — G43.909 MIGRAINE WITHOUT STATUS MIGRAINOSUS, NOT INTRACTABLE, UNSPECIFIED MIGRAINE TYPE: ICD-10-CM

## 2021-09-01 RX ORDER — MIRTAZAPINE 7.5 MG/1
7.5 TABLET, FILM COATED ORAL
Qty: 30 TABLET | Refills: 5 | Status: SHIPPED | OUTPATIENT
Start: 2021-09-01 | End: 2022-03-16

## 2021-10-01 DIAGNOSIS — Z30.41 ENCOUNTER FOR SURVEILLANCE OF CONTRACEPTIVE PILLS: ICD-10-CM

## 2021-10-04 RX ORDER — NORGESTIMATE AND ETHINYL ESTRADIOL 0.25-0.035
1 KIT ORAL DAILY
Qty: 28 TABLET | Refills: 0 | Status: SHIPPED | OUTPATIENT
Start: 2021-10-04 | End: 2022-01-25 | Stop reason: SDUPTHER

## 2021-10-27 ENCOUNTER — APPOINTMENT (EMERGENCY)
Dept: RADIOLOGY | Facility: HOSPITAL | Age: 21
End: 2021-10-27
Payer: COMMERCIAL

## 2021-10-27 ENCOUNTER — HOSPITAL ENCOUNTER (EMERGENCY)
Facility: HOSPITAL | Age: 21
Discharge: HOME | End: 2021-10-27
Attending: EMERGENCY MEDICINE
Payer: COMMERCIAL

## 2021-10-27 VITALS
HEART RATE: 90 BPM | TEMPERATURE: 97.2 F | WEIGHT: 118.7 LBS | DIASTOLIC BLOOD PRESSURE: 62 MMHG | OXYGEN SATURATION: 100 % | BODY MASS INDEX: 21.71 KG/M2 | SYSTOLIC BLOOD PRESSURE: 104 MMHG | RESPIRATION RATE: 12 BRPM

## 2021-10-27 DIAGNOSIS — R06.00 DYSPNEA: Primary | ICD-10-CM

## 2021-10-27 DIAGNOSIS — J06.9 VIRAL URI: ICD-10-CM

## 2021-10-27 DIAGNOSIS — Z20.822 COVID-19 VIRUS TEST RESULT UNKNOWN: ICD-10-CM

## 2021-10-27 LAB
BACTERIA UR QL AUTO: ABNORMAL /HPF
BILIRUB UR QL STRIP: NEGATIVE
CLARITY UR: CLEAR
COLOR UR: ABNORMAL
EXT PREG TEST URINE: NEGATIVE
EXT. CONTROL ED NAV: NORMAL
FLUAV RNA RESP QL NAA+PROBE: NEGATIVE
FLUBV RNA RESP QL NAA+PROBE: NEGATIVE
GLUCOSE SERPL-MCNC: 96 MG/DL (ref 65–140)
GLUCOSE UR STRIP-MCNC: NEGATIVE MG/DL
HGB UR QL STRIP.AUTO: 250
KETONES UR STRIP-MCNC: NEGATIVE MG/DL
LEUKOCYTE ESTERASE UR QL STRIP: NEGATIVE
NITRITE UR QL STRIP: NEGATIVE
NON-SQ EPI CELLS URNS QL MICRO: ABNORMAL /HPF
PH UR STRIP.AUTO: 5 [PH]
PROT UR STRIP-MCNC: NEGATIVE MG/DL
RBC #/AREA URNS AUTO: ABNORMAL /HPF
RSV RNA RESP QL NAA+PROBE: NEGATIVE
SARS-COV-2 RNA RESP QL NAA+PROBE: NEGATIVE
SP GR UR STRIP.AUTO: 1.01 (ref 1–1.04)
UROBILINOGEN UA: NEGATIVE MG/DL
WBC #/AREA URNS AUTO: ABNORMAL /HPF

## 2021-10-27 PROCEDURE — 81001 URINALYSIS AUTO W/SCOPE: CPT | Performed by: EMERGENCY MEDICINE

## 2021-10-27 PROCEDURE — 82948 REAGENT STRIP/BLOOD GLUCOSE: CPT

## 2021-10-27 PROCEDURE — 99285 EMERGENCY DEPT VISIT HI MDM: CPT | Performed by: EMERGENCY MEDICINE

## 2021-10-27 PROCEDURE — 99285 EMERGENCY DEPT VISIT HI MDM: CPT

## 2021-10-27 PROCEDURE — 81025 URINE PREGNANCY TEST: CPT | Performed by: EMERGENCY MEDICINE

## 2021-10-27 PROCEDURE — 71046 X-RAY EXAM CHEST 2 VIEWS: CPT

## 2021-10-27 PROCEDURE — 93005 ELECTROCARDIOGRAM TRACING: CPT

## 2021-10-27 PROCEDURE — 0241U HB NFCT DS VIR RESP RNA 4 TRGT: CPT | Performed by: EMERGENCY MEDICINE

## 2021-10-28 LAB
ATRIAL RATE: 71 BPM
P AXIS: -17 DEGREES
PR INTERVAL: 122 MS
QRS AXIS: 54 DEGREES
QRSD INTERVAL: 86 MS
QT INTERVAL: 386 MS
QTC INTERVAL: 419 MS
T WAVE AXIS: 35 DEGREES
VENTRICULAR RATE: 71 BPM

## 2021-10-28 PROCEDURE — 93010 ELECTROCARDIOGRAM REPORT: CPT | Performed by: INTERNAL MEDICINE

## 2022-01-11 ENCOUNTER — TELEPHONE (OUTPATIENT)
Dept: OBGYN CLINIC | Facility: CLINIC | Age: 22
End: 2022-01-11

## 2022-01-20 ENCOUNTER — RA CDI HCC (OUTPATIENT)
Dept: OTHER | Facility: HOSPITAL | Age: 22
End: 2022-01-20

## 2022-01-20 NOTE — PROGRESS NOTES
[facility]  coding opportunities       Chart reviewed, no opportunity found: CHART REVIEWED, NO OPPORTUNITY FOUND                        Patients insurance company:  [employer] [facility] (Medicare Advantage and Commercial)

## 2022-01-25 ENCOUNTER — OFFICE VISIT (OUTPATIENT)
Dept: OBGYN CLINIC | Facility: CLINIC | Age: 22
End: 2022-01-25

## 2022-01-25 VITALS
HEART RATE: 76 BPM | DIASTOLIC BLOOD PRESSURE: 73 MMHG | BODY MASS INDEX: 22.31 KG/M2 | SYSTOLIC BLOOD PRESSURE: 106 MMHG | WEIGHT: 122 LBS

## 2022-01-25 DIAGNOSIS — Z30.41 ENCOUNTER FOR SURVEILLANCE OF CONTRACEPTIVE PILLS: ICD-10-CM

## 2022-01-25 DIAGNOSIS — Z30.09 GENERAL COUNSELING AND ADVICE ON FEMALE CONTRACEPTION: Primary | ICD-10-CM

## 2022-01-25 PROCEDURE — 99213 OFFICE O/P EST LOW 20 MIN: CPT | Performed by: NURSE PRACTITIONER

## 2022-01-25 RX ORDER — NORGESTIMATE AND ETHINYL ESTRADIOL 0.25-0.035
1 KIT ORAL DAILY
Qty: 28 TABLET | Refills: 11 | Status: SHIPPED | OUTPATIENT
Start: 2022-01-25 | End: 2022-05-24 | Stop reason: SDUPTHER

## 2022-01-25 NOTE — PATIENT INSTRUCTIONS
Annual GYN  exam and PAP are due after 5/6/2022  Call with needs or concerns  Continue birth control pills as previously directed    COVID-19 Instructions    If you are having any of the following:  Cough   Shortness of breath   Fever  If traveled within past 2 weeks internationally or to high risk [place] states  Or been in contact with someone that has     Please call either:   Your PCP office  [phone], option [phone]    They will screen you over the phone and direct you to the nearest appropriate testing location    DO NOT go to your PCP or OB office without calling first

## 2022-01-25 NOTE — PROGRESS NOTES
Assessment/Plan:         Diagnoses and all orders for this visit:    General counseling and advice on female contraception    Encounter for surveillance of contraceptive pills  -     norgestimate-ethinyl estradiol (ORTHO-CYCLEN) 0 25-35 MG-MCG per tablet; Take 1 tablet by mouth daily        Plan  Annual GYN  exam and PAP are due after 5/6/2022  Call with needs or concerns  Continue birth control pills as previously directed  Pt verbalized understanding of all discussed  Subjective:      Patient ID: Eboni Faria is a 22 y o  female  HPI  Pt presents to continue OCP's  Pt states period are only 4 days and not painful  Pt is remembering OCP's    The following portions of the patient's history were reviewed and updated as appropriate: allergies, current medications, past family history, past medical history, past social history, past surgical history and problem list     Review of Systems      Pertinent items are note in the HPI    Objective:      /73   Pulse 76   Wt 55 3 kg (122 lb)   LMP 01/18/2022   BMI 22 31 kg/m²          Physical Exam  Vitals reviewed  Constitutional:       Appearance: Normal appearance  Eyes:      General:         Right eye: No discharge  Left eye: No discharge  Pulmonary:      Effort: Pulmonary effort is normal  No respiratory distress  Musculoskeletal:         General: Normal range of motion  Cervical back: Normal range of motion  Neurological:      Mental Status: She is alert and oriented to person, place, and time  Psychiatric:         Mood and Affect: Mood normal          Behavior: Behavior normal          Thought Content:  Thought content normal        Negative cough or SOB

## 2022-01-27 ENCOUNTER — OFFICE VISIT (OUTPATIENT)
Dept: FAMILY MEDICINE CLINIC | Facility: CLINIC | Age: 22
End: 2022-01-27

## 2022-01-27 VITALS
BODY MASS INDEX: 22.08 KG/M2 | SYSTOLIC BLOOD PRESSURE: 100 MMHG | RESPIRATION RATE: 16 BRPM | HEART RATE: 64 BPM | OXYGEN SATURATION: 99 % | TEMPERATURE: 97.5 F | HEIGHT: 62 IN | DIASTOLIC BLOOD PRESSURE: 60 MMHG | WEIGHT: 120 LBS

## 2022-01-27 DIAGNOSIS — Z00.00 ANNUAL PHYSICAL EXAM: ICD-10-CM

## 2022-01-27 DIAGNOSIS — G43.909 MIGRAINE WITHOUT STATUS MIGRAINOSUS, NOT INTRACTABLE, UNSPECIFIED MIGRAINE TYPE: Primary | ICD-10-CM

## 2022-01-27 PROCEDURE — 99395 PREV VISIT EST AGE 18-39: CPT | Performed by: NURSE PRACTITIONER

## 2022-01-27 RX ORDER — SUMATRIPTAN 25 MG/1
12.5 TABLET, FILM COATED ORAL ONCE AS NEEDED
Qty: 10 TABLET | Refills: 0 | Status: SHIPPED | OUTPATIENT
Start: 2022-01-27 | End: 2022-04-24 | Stop reason: SDUPTHER

## 2022-01-27 NOTE — ASSESSMENT & PLAN NOTE
PLAN:  Recommendations: lie in darkened room and apply cold packs prn for pain, episodic therapy with triptan therapy due to intensity of pain, side effect profile discussed in detail, asked to keep headache diary, neurodiagnostic workup indicated per orders and referral to Neurology    I believe MRI to be medically necessary due to worsening nature of her migraines and to establish baseline as it was never obtained in the past

## 2022-01-27 NOTE — PROGRESS NOTES
ADULT ANNUAL PHYSICAL  [facility]    NAME: Lorrie Boland  AGE: 22 y o  SEX: female  : 2000     DATE: 2022     Assessment and Plan:     Problem List Items Addressed This Visit        Cardiovascular and Mediastinum    Migraine without status migrainosus, not intractable - Primary     PLAN:  Recommendations: lie in darkened room and apply cold packs prn for pain, episodic therapy with triptan therapy due to intensity of pain, side effect profile discussed in detail, asked to keep headache diary, neurodiagnostic workup indicated per orders and referral to Neurology  I believe MRI to be medically necessary due to worsening nature of her migraines and to establish baseline as it was never obtained in the past          Relevant Medications    SUMAtriptan (Imitrex) 25 mg tablet    Other Relevant Orders    Ambulatory Referral to Neurology    MRI brain wo contrast          Immunizations and preventive care screenings were discussed with patient today  Appropriate education was printed on patient's after visit summary  Counseling:  Alcohol/drug use: discussed moderation in alcohol intake, the recommendations for healthy alcohol use, and avoidance of illicit drug use  Dental Health: discussed importance of regular tooth brushing, flossing, and dental visits  Injury prevention: discussed safety/seat belts, safety helmets, smoke detectors, carbon dioxide detectors, and smoking near bedding or upholstery  Sexual health: discussed sexually transmitted diseases, partner selection, use of condoms, avoidance of unintended pregnancy, and contraceptive alternatives  · Exercise: the importance of regular exercise/physical activity was discussed  Recommend exercise 3-5 times per week for at least 30 minutes  Depression Screening and Follow-up Plan: Patient was screened for depression during today's encounter   They screened negative with a PHQ-2 score of 0  Return in about 6 weeks (around 3/10/2022) for Recheck migraines  Chief Complaint:     Chief Complaint   Patient presents with    Physical Exam      History of Present Illness:     Adult Annual Physical   Patient here for a comprehensive physical exam  The patient reports  She has a well established history of recurrent migraines  Description of pain: throbbing pain, sharp pain, squeezing pain, bilateral in the temporal area  Pain rating 10/10  Associated symptoms: dizziness, light sensitivity, teeth grinding/clenching  Patient has already taken tylenol, motrin, naproxen, Excedrin, Mirtazapine for this headache without relief  Eating 2 meals / day , she skips her 3 rd meal 2 days per week on average  She drinks 3 water bottles/day  She denies daily or excessive caffeine       She is going to bed as late as 3 am  She is not napping  She is not taking melatonin and she watches Tv to help her fall asleep       In between headaches she is ok  No unexplained N/V  No mental status changes  Diet and Physical Activity  · Diet/Nutrition: well balanced diet  · Exercise: walking and 1-2 times a week on average  Depression Screening  PHQ-2/9 Depression Screening    Little interest or pleasure in doing things: 0 - not at all  Feeling down, depressed, or hopeless: 0 - not at all  PHQ-2 Score: 0  PHQ-2 Interpretation: Negative depression screen       General Health  · Sleep: sleeps well  - difficult falling asleep, Remeron helps  · Hearing: normal - bilateral   · Vision: goes for regular eye exams and wears glasses  · Dental: regular dental visits  /GYN Health  · Light periods  Headaches did not start from BCP  Review of Systems:     Review of Systems   Constitutional: Negative  HENT: Negative  Eyes: Positive for photophobia and visual disturbance  Respiratory: Negative  Cardiovascular: Negative  Gastrointestinal: Negative  Endocrine: Negative  Genitourinary: Negative  Musculoskeletal: Negative  Skin: Negative  Allergic/Immunologic: Negative  Neurological: Positive for dizziness and headaches  Negative for tremors, syncope, weakness, light-headedness and numbness  Psychiatric/Behavioral: Negative  Past Medical History:     Past Medical History:   Diagnosis Date    ADHD (attention deficit hyperactivity disorder)     last seen 1 year ago, no recent RX, was on adderal, off at least 1 year     Migraine     OCD (obsessive compulsive disorder)     no treatment      Past Surgical History:     History reviewed  No pertinent surgical history  Social History:     Social History     Socioeconomic History    Marital status: Single     Spouse name: None    Number of children: None    Years of education: None    Highest education level: None   Occupational History    None   Tobacco Use    Smoking status: Never Smoker    Smokeless tobacco: Never Used   Substance and Sexual Activity    Alcohol use: No    Drug use: Not Currently     Types: Marijuana    Sexual activity: Yes     Birth control/protection: Pill     Comment: patient's cell [phone]; lives with Mom, may stay at boyfriends house on [address]  Other Topics Concern    None   Social History Narrative    Lives with mother and sister  Social Determinants of Health     Financial Resource Strain: Low Risk     Difficulty of Paying Living Expenses: Not hard at all   Food Insecurity: No Food Insecurity    Worried About Running Out of Food in the Last Year: Never true    Albert of Food in the Last Year: Never true   Transportation Needs: No Transportation Needs    Lack of Transportation (Medical): No    Lack of Transportation (Non-Medical):  No   Physical Activity: Insufficiently Active    Days of Exercise per Week: 2 days    Minutes of Exercise per Session: 30 min   Stress: Not on file   Social Connections: Socially Isolated    Frequency of Communication with Friends and Family: Three times a week    Frequency of Social Gatherings with Friends and Family: Three times a week    Attends Latter day Services: Never    Active Member of Clubs or Organizations: No    Attends Club or Organization Meetings: Never    Marital Status: Never    Intimate Partner Violence: Not At Risk    Fear of Current or Ex-Partner: No    Emotionally Abused: No    Physically Abused: No    Sexually Abused: No   Housing Stability: Low Risk     Unable to Pay for Housing in the Last Year: No    Number of [place] in the Last Year: 1    Unstable Housing in the Last Year: No      Family History:     Family History   Problem Relation Age of Onset   [facility] Migraines Mother     ADD / ADHD Father     Heart disease Maternal Grandmother     Diabetes Maternal Grandmother       Current Medications:     Current Outpatient Medications   Medication Sig Dispense Refill    mirtazapine (REMERON) 7 5 MG tablet TAKE 1 TABLET (7 5 MG TOTAL) BY MOUTH DAILY AT BEDTIME 30 tablet 5    norgestimate-ethinyl estradiol (ORTHO-CYCLEN) 0 25-35 MG-MCG per tablet Take 1 tablet by mouth daily 28 tablet 11    SUMAtriptan (Imitrex) 25 mg tablet Take 0 5 tablets (12 5 mg total) by mouth once as needed for migraine 10 tablet 0     No current facility-administered medications for this visit  Allergies:     No Known Allergies   Physical Exam:     /60 (BP Location: Right arm, Patient Position: Sitting, Cuff Size: Standard)   Pulse 64   Temp 97 5 °F (36 4 °C)   Resp 16   Ht 5' 2" (1 575 m)   Wt 54 4 kg (120 lb)   LMP 01/18/2022 (Approximate)   SpO2 99%   Breastfeeding No   BMI 21 95 kg/m²     Physical Exam  Vitals and nursing note reviewed  Constitutional:       General: She is not in acute distress  Appearance: Normal appearance  She is well-developed and normal weight  HENT:      Head: Normocephalic and atraumatic        Right Ear: Tympanic membrane, ear canal and external ear normal  There is no impacted cerumen  Left Ear: Tympanic membrane, ear canal and external ear normal  There is no impacted cerumen  Nose: Nose normal  No congestion  Mouth/Throat:      Mouth: Mucous membranes are moist       Pharynx: No posterior oropharyngeal erythema  Eyes:      Extraocular Movements: Extraocular movements intact  Conjunctiva/sclera: Conjunctivae normal       Pupils: Pupils are equal, round, and reactive to light  Cardiovascular:      Rate and Rhythm: Normal rate and regular rhythm  Pulses: Normal pulses  Heart sounds: Normal heart sounds  No murmur heard  Pulmonary:      Effort: Pulmonary effort is normal  No respiratory distress  Breath sounds: Normal breath sounds  Abdominal:      General: Abdomen is flat  Bowel sounds are normal       Palpations: Abdomen is soft  Tenderness: There is no abdominal tenderness  Musculoskeletal:         General: Normal range of motion  Cervical back: Normal range of motion and neck supple  Skin:     General: Skin is warm and dry  Neurological:      General: No focal deficit present  Mental Status: She is alert and oriented to person, place, and time  Cranial Nerves: Cranial nerves are intact  Sensory: Sensation is intact  Motor: Motor function is intact  Coordination: Coordination is intact  Gait: Gait is intact  Comments: Normal neuro exam    Psychiatric:         Mood and Affect: Mood normal          Behavior: Behavior normal          Thought Content:  Thought content normal           Damari Ferguson 35

## 2022-01-27 NOTE — PATIENT INSTRUCTIONS

## 2022-02-04 ENCOUNTER — TELEPHONE (OUTPATIENT)
Dept: NEUROLOGY | Facility: CLINIC | Age: 22
End: 2022-02-04

## 2022-02-21 ENCOUNTER — HOSPITAL ENCOUNTER (EMERGENCY)
Facility: HOSPITAL | Age: 22
Discharge: HOME | End: 2022-02-21
Attending: EMERGENCY MEDICINE
Payer: MEDICARE

## 2022-02-21 ENCOUNTER — TELEPHONE (OUTPATIENT)
Dept: FAMILY MEDICINE CLINIC | Facility: CLINIC | Age: 22
End: 2022-02-21

## 2022-02-21 VITALS
WEIGHT: 123.6 LBS | RESPIRATION RATE: 18 BRPM | DIASTOLIC BLOOD PRESSURE: 60 MMHG | OXYGEN SATURATION: 99 % | BODY MASS INDEX: 22.61 KG/M2 | SYSTOLIC BLOOD PRESSURE: 110 MMHG | HEART RATE: 127 BPM | TEMPERATURE: 98.2 F

## 2022-02-21 DIAGNOSIS — B34.9 VIRAL SYNDROME: Primary | ICD-10-CM

## 2022-02-21 LAB
EXT PREG TEST URINE: NEGATIVE
EXT. CONTROL ED NAV: NORMAL
FLUAV RNA RESP QL NAA+PROBE: NEGATIVE
FLUBV RNA RESP QL NAA+PROBE: NEGATIVE
RSV RNA RESP QL NAA+PROBE: NEGATIVE
SARS-COV-2 RNA RESP QL NAA+PROBE: NEGATIVE

## 2022-02-21 PROCEDURE — 96372 THER/PROPH/DIAG INJ SC/IM: CPT

## 2022-02-21 PROCEDURE — 81025 URINE PREGNANCY TEST: CPT | Performed by: PHYSICIAN ASSISTANT

## 2022-02-21 PROCEDURE — 99284 EMERGENCY DEPT VISIT MOD MDM: CPT | Performed by: PHYSICIAN ASSISTANT

## 2022-02-21 PROCEDURE — 0241U HB NFCT DS VIR RESP RNA 4 TRGT: CPT | Performed by: PHYSICIAN ASSISTANT

## 2022-02-21 PROCEDURE — 99283 EMERGENCY DEPT VISIT LOW MDM: CPT

## 2022-02-21 RX ORDER — SENNOSIDES 8.6 MG
650 CAPSULE ORAL EVERY 8 HOURS PRN
Qty: 30 TABLET | Refills: 0 | Status: SHIPPED | OUTPATIENT
Start: 2022-02-21

## 2022-02-21 RX ORDER — KETOROLAC TROMETHAMINE 30 MG/ML
15 INJECTION, SOLUTION INTRAMUSCULAR; INTRAVENOUS ONCE
Status: COMPLETED | OUTPATIENT
Start: 2022-02-21 | End: 2022-02-21

## 2022-02-21 RX ORDER — ACETAMINOPHEN 325 MG/1
650 TABLET ORAL ONCE
Status: COMPLETED | OUTPATIENT
Start: 2022-02-21 | End: 2022-02-21

## 2022-02-21 RX ORDER — ONDANSETRON 4 MG/1
4 TABLET, ORALLY DISINTEGRATING ORAL ONCE
Status: COMPLETED | OUTPATIENT
Start: 2022-02-21 | End: 2022-02-21

## 2022-02-21 RX ORDER — NAPROXEN 500 MG/1
500 TABLET ORAL 2 TIMES DAILY WITH MEALS
Qty: 30 TABLET | Refills: 0 | Status: SHIPPED | OUTPATIENT
Start: 2022-02-21

## 2022-02-21 RX ADMIN — ONDANSETRON 4 MG: 4 TABLET, ORALLY DISINTEGRATING ORAL at 21:31

## 2022-02-21 RX ADMIN — ACETAMINOPHEN 650 MG: 325 TABLET ORAL at 21:31

## 2022-02-21 RX ADMIN — KETOROLAC TROMETHAMINE 15 MG: 30 INJECTION, SOLUTION INTRAMUSCULAR; INTRAVENOUS at 21:31

## 2022-02-21 RX ADMIN — DEXAMETHASONE SODIUM PHOSPHATE 10 MG: 10 INJECTION, SOLUTION INTRAMUSCULAR; INTRAVENOUS at 21:31

## 2022-02-22 NOTE — ED PROVIDER NOTES
History  Chief Complaint   Patient presents with    Generalized Body Aches     Pt states she hurts all over, "and it even hurts to touch my skin;" states it started a few hours ago    Nausea     Denies V/D however     Patient presents for an evaluation of generalized body aches, nausea ongoing for the past day  Denies any known sick contacts  Complaining of nausea, but denies any vomiting/ diarrhea  No chest pain, shortness of breath, congestion, sore throat, headache, dizziness  States started this morning after waking up  Has tried taking Dayquil for symptoms prior to arrival  No other complaints  Prior to Admission Medications   Prescriptions Last Dose Informant Patient Reported? Taking? SUMAtriptan (Imitrex) 25 mg tablet   No No   Sig: Take 0 5 tablets (12 5 mg total) by mouth once as needed for migraine   mirtazapine (REMERON) 7 5 MG tablet   No No   Sig: TAKE 1 TABLET (7 5 MG TOTAL) BY MOUTH DAILY AT BEDTIME   norgestimate-ethinyl estradiol (ORTHO-CYCLEN) 0 25-35 MG-MCG per tablet   No No   Sig: Take 1 tablet by mouth daily      Facility-Administered Medications: None       Past Medical History:   Diagnosis Date    ADHD (attention deficit hyperactivity disorder)     last seen 1 year ago, no recent RX, was on adderal, off at least 1 year     Migraine     OCD (obsessive compulsive disorder)     no treatment       History reviewed  No pertinent surgical history  Family History   Problem Relation Age of Onset   Earna Jacqueline Migraines Mother     ADD / ADHD Father     Heart disease Maternal Grandmother     Diabetes Maternal Grandmother      I have reviewed and agree with the history as documented  E-Cigarette/Vaping     E-Cigarette/Vaping Substances     Social History     Tobacco Use    Smoking status: Never Smoker    Smokeless tobacco: Never Used   Substance Use Topics    Alcohol use: No    Drug use: Not Currently     Types: Marijuana       Review of Systems   Constitutional: Positive for chills  Negative for fever  HENT: Negative for congestion, ear pain and sore throat  Eyes: Negative for pain  Respiratory: Negative for cough and shortness of breath  Cardiovascular: Negative for chest pain  Gastrointestinal: Positive for nausea  Negative for abdominal pain and vomiting  Genitourinary: Negative for dysuria  Musculoskeletal: Negative for back pain  Skin: Negative for rash  Neurological: Negative for dizziness and numbness  Psychiatric/Behavioral: Negative for suicidal ideas  All other systems reviewed and are negative  Physical Exam  Physical Exam  Vitals reviewed  Constitutional:       Appearance: Normal appearance  She is well-developed  HENT:      Head: Normocephalic and atraumatic  Right Ear: External ear normal       Left Ear: External ear normal       Nose: Nose normal       Mouth/Throat:      Mouth: Mucous membranes are moist       Pharynx: Oropharynx is clear  Eyes:      Extraocular Movements: Extraocular movements intact  Cardiovascular:      Rate and Rhythm: Normal rate and regular rhythm  Heart sounds: Normal heart sounds  Pulmonary:      Effort: Pulmonary effort is normal       Breath sounds: Normal breath sounds  Abdominal:      General: Bowel sounds are normal       Palpations: Abdomen is soft  Tenderness: There is no abdominal tenderness  Musculoskeletal:         General: Normal range of motion  Cervical back: Normal range of motion and neck supple  Skin:     General: Skin is warm and dry  Capillary Refill: Capillary refill takes less than 2 seconds  Neurological:      Mental Status: She is alert and oriented to person, place, and time     Psychiatric:         Behavior: Behavior normal          Vital Signs  ED Triage Vitals   Temperature Pulse Respirations Blood Pressure SpO2   02/21/22 2103 02/21/22 2108 02/21/22 2103 02/21/22 2108 02/21/22 2108   98 2 °F (36 8 °C) (!) 127 18 110/60 99 %      Temp Source Heart Rate Source Patient Position - Orthostatic VS BP Location FiO2 (%)   02/21/22 2103 02/21/22 2108 -- -- --   Oral Monitor         Pain Score       --                  Vitals:    02/21/22 2108   BP: 110/60   Pulse: (!) 127         Visual Acuity      ED Medications  Medications   ketorolac (TORADOL) injection 15 mg (15 mg Intramuscular Given 2/21/22 2131)   acetaminophen (TYLENOL) tablet 650 mg (650 mg Oral Given 2/21/22 2131)   dexamethasone oral liquid 10 mg 1 mL (10 mg Oral Given 2/21/22 2131)   ondansetron (ZOFRAN-ODT) dispersible tablet 4 mg (4 mg Oral Given 2/21/22 2131)       Diagnostic Studies  Results Reviewed     Procedure Component Value Units Date/Time    POCT pregnancy, urine [[id number]]  (Normal) Resulted: 02/21/22 2130    Lab Status: Final result Updated: 02/21/22 2130     EXT PREG TEST UR (Ref: Negative) negative     Control valid    COVID19, Influenza A/B, RSV PCR, [facility] [[id number]] Collected: 02/21/22 2122    Lab Status: In process Specimen: Nares from Nasopharyngeal Swab Updated: 02/21/22 2127                 No orders to display              Procedures  Procedures         ED Course                                             MDM  Number of Diagnoses or Management Options  Viral syndrome  Diagnosis management comments: Likely viral syndrome given symptoms  Swabbed for COVID/ flu/ RSV  Feeling improved with medication  Patient verbalizes understanding and agrees with plan  The management plan was discussed in detail with the patient at bedside and all questions were answered  Prior to discharge, I provided both verbal and written instructions  I discussed with the patient the signs and symptoms for which to return to the emergency department  All questions were answered and patient was comfortable with the plan of care and discharged to home  The patient agrees to return to the Emergency Department for concerns and/or progression of illness          Disposition  Final diagnoses:   Viral syndrome     Time reflects when diagnosis was documented in both MDM as applicable and the Disposition within this note     Time User Action Codes Description Comment    2/21/2022  9:43 PM Jer Shelley Add [B34 9] Viral syndrome       ED Disposition     ED Disposition Condition Date/Time Comment    Discharge Stable Mon Feb 21, 2022  9:43 PM Angelina De Leon discharge to home/self care  Follow-up Information     Follow up With Specialties Details Why Contact Info Additional Information    Bianca Do, [facility] Nurse Practitioner   [address] [address], [address]  [address] [facility]  [place] [address]  [address]   [address], [address] [facility] [address]  [address], [address], [address] [place], [place], [address]          Patient's Medications   Discharge Prescriptions    ACETAMINOPHEN (TYLENOL) 650 MG CR TABLET    Take 1 tablet (650 mg total) by mouth every 8 (eight) hours as needed for mild pain       Start Date: 2/21/2022 End Date: --       Order Dose: 650 mg       Quantity: 30 tablet    Refills: 0    NAPROXEN (NAPROSYN) 500 MG TABLET    Take 1 tablet (500 mg total) by mouth 2 (two) times a day with meals       Start Date: 2/21/2022 End Date: --       Order Dose: 500 mg       Quantity: 30 tablet    Refills: 0       No discharge procedures on file      PDMP Review     None          ED Provider  Electronically Signed by           Maria Elena Luis PA-C  02/21/22 0141

## 2022-03-16 DIAGNOSIS — G43.909 MIGRAINE WITHOUT STATUS MIGRAINOSUS, NOT INTRACTABLE, UNSPECIFIED MIGRAINE TYPE: ICD-10-CM

## 2022-03-16 RX ORDER — MIRTAZAPINE 7.5 MG/1
7.5 TABLET, FILM COATED ORAL
Qty: 30 TABLET | Refills: 5 | Status: SHIPPED | OUTPATIENT
Start: 2022-03-16

## 2022-04-24 DIAGNOSIS — G43.909 MIGRAINE WITHOUT STATUS MIGRAINOSUS, NOT INTRACTABLE, UNSPECIFIED MIGRAINE TYPE: ICD-10-CM

## 2022-04-25 RX ORDER — SUMATRIPTAN 25 MG/1
12.5 TABLET, FILM COATED ORAL ONCE AS NEEDED
Qty: 10 TABLET | Refills: 0 | Status: SHIPPED | OUTPATIENT
Start: 2022-04-25 | End: 2022-05-18 | Stop reason: SDUPTHER

## 2022-05-04 ENCOUNTER — HOSPITAL ENCOUNTER (OUTPATIENT)
Dept: MRI IMAGING | Facility: HOSPITAL | Age: 22
Discharge: HOME | End: 2022-05-04
Payer: MEDICARE

## 2022-05-04 DIAGNOSIS — G43.909 MIGRAINE WITHOUT STATUS MIGRAINOSUS, NOT INTRACTABLE, UNSPECIFIED MIGRAINE TYPE: ICD-10-CM

## 2022-05-04 PROCEDURE — 70551 MRI BRAIN STEM W/O DYE: CPT

## 2022-05-04 PROCEDURE — G1004 CDSM NDSC: HCPCS

## 2022-05-18 DIAGNOSIS — G43.909 MIGRAINE WITHOUT STATUS MIGRAINOSUS, NOT INTRACTABLE, UNSPECIFIED MIGRAINE TYPE: ICD-10-CM

## 2022-05-18 RX ORDER — SUMATRIPTAN 25 MG/1
12.5 TABLET, FILM COATED ORAL ONCE AS NEEDED
Qty: 10 TABLET | Refills: 0 | Status: SHIPPED | OUTPATIENT
Start: 2022-05-18

## 2022-05-24 ENCOUNTER — ANNUAL EXAM (OUTPATIENT)
Dept: OBGYN CLINIC | Facility: CLINIC | Age: 22
End: 2022-05-24

## 2022-05-24 VITALS
BODY MASS INDEX: 21.58 KG/M2 | HEART RATE: 67 BPM | DIASTOLIC BLOOD PRESSURE: 72 MMHG | WEIGHT: 118 LBS | SYSTOLIC BLOOD PRESSURE: 106 MMHG

## 2022-05-24 DIAGNOSIS — Z01.419 ENCOUNTER FOR ANNUAL ROUTINE GYNECOLOGICAL EXAMINATION: Primary | ICD-10-CM

## 2022-05-24 DIAGNOSIS — Z30.41 ENCOUNTER FOR SURVEILLANCE OF CONTRACEPTIVE PILLS: ICD-10-CM

## 2022-05-24 DIAGNOSIS — Z11.3 SCREEN FOR STD (SEXUALLY TRANSMITTED DISEASE): ICD-10-CM

## 2022-05-24 PROCEDURE — 87591 N.GONORRHOEAE DNA AMP PROB: CPT | Performed by: NURSE PRACTITIONER

## 2022-05-24 PROCEDURE — 87491 CHLMYD TRACH DNA AMP PROBE: CPT | Performed by: NURSE PRACTITIONER

## 2022-05-24 PROCEDURE — 99395 PREV VISIT EST AGE 18-39: CPT | Performed by: NURSE PRACTITIONER

## 2022-05-24 PROCEDURE — G0145 SCR C/V CYTO,THINLAYER,RESCR: HCPCS | Performed by: NURSE PRACTITIONER

## 2022-05-24 RX ORDER — METHYLPREDNISOLONE 4 MG/1
TABLET ORAL
COMMUNITY
Start: 2022-04-12

## 2022-05-24 RX ORDER — NORGESTIMATE AND ETHINYL ESTRADIOL 0.25-0.035
1 KIT ORAL DAILY
Qty: 28 TABLET | Refills: 11 | Status: SHIPPED | OUTPATIENT
Start: 2022-05-24

## 2022-05-24 NOTE — PROGRESS NOTES
Annual Exam    Assessment   1  Encounter for annual routine gynecological examination  Liquid-based pap, screening   2  Encounter for surveillance of contraceptive pills  norgestimate-ethinyl estradiol (ORTHO-CYCLEN) 0 25-35 MG-MCG per tablet   3  Screen for STD (sexually transmitted disease)  Chlamydia/GC amplified DNA by PCR    CANCELED: Chlamydia/GC amplified DNA by PCR     well woman       Plan       All questions answered  Breast self exam technique reviewed and patient encouraged to perform self-exam monthly  Contraception: OCP (estrogen/progesterone)  Discussed healthy lifestyle modifications  Follow up in 1 year  Thin prep Pap smear  Patient Instructions   PAP and STD results can take up to 2 weeks  Continue birth control pills as previously described  Remember safe sex and condom use  Call with needs or concerns    Return in 1 year  Pt verbalized understanding of all discussed  Bautista Mathew is a 25 y o  No obstetric history on file  female who presents for annual well woman exam  Periods are regular every 28-30 days, lasting 4 days  No intermenstrual bleeding, spotting, or discharge  1 partner x 4 years    Depression Screening Follow-up Plan: Patient's depression screening was negative with a PHQ-2 score of 0  Their PHQ-9 score was 0   Clinically patient does not have depression  No treatment is required  Current contraception: OCP (estrogen/progesterone)  History of abnormal Pap smear: First PAP today  Family history of uterine or ovarian cancer: no  Regular self breast exam: yes  History of abnormal mammogram: N/A  Family history of breast cancer: no  History of abnormal lipids: unknown  Menstrual History:  OB History        0    Para   0    Term   0       0    AB   0    Living   0       SAB   0    IAB   0    Ectopic   0    Multiple   0    Live Births   0                Menarche age: 13  Patient's last menstrual period was 2022         The following portions of the patient's history were reviewed and updated as appropriate: allergies, current medications, past family history, past medical history, past social history, past surgical history and problem list     Review of Systems  Pertinent items are noted in HPI        Objective      /72   Pulse 67   Wt 53 5 kg (118 lb)   LMP 05/03/2022   BMI 21 58 kg/m²     General: alert and oriented, in no acute distress, alert, appears stated age and cooperative   Heart: regular rate and rhythm, S1, S2 normal, no murmur, click, rub or gallop   Lungs: clear to auscultation bilaterally, WNL respiratory effort, negative cough or SOB   Thyroid: Negative masses   Abdomen: soft, non-tender, without masses or organomegaly   Vulva: normal   Vagina: normal mucosa   Cervix: no bleeding following Pap, no cervical motion tenderness and no lesions   Uterus: normal size, non-tender, normal shape and consistency   Adnexa: normal adnexa   Urethra: normal   Breasts: NT,negative masses, discharge, or dimpling

## 2022-05-24 NOTE — LETTER
May 31, 2022    Tereso Welch  [address] [place] [address]          2022    To Tereso Welch  : 2000      This letter is to advise you that your recent CULTURE for gonorrhea and chlamydia results were reviewed by me and are NORMAL  Please contact the office for an appointment if you have any additional concerns      SIL Baron

## 2022-05-24 NOTE — PATIENT INSTRUCTIONS
PAP and STD results can take up to 2 weeks  Continue birth control pills as previously described  Remember safe sex and condom use  Call with needs or concerns    Return in 1 year    Birth Control Pills   WHAT YOU NEED TO KNOW:   Birth control pills are also called oral contraceptives, or the pill  It is medicine that helps prevent pregnancy  Birth control pills work by preventing ovulation  Ovulation is when the ovaries make and release an egg cell each month  If this egg gets fertilized by sperm, pregnancy occurs  Birth control pills may also help to prevent pregnancy by keeping sperm from fertilizing an egg  DISCHARGE INSTRUCTIONS:   Follow up with your healthcare provider as directed:  Write down your questions so you remember to ask them during your visits  Advantages of birth control pills:  When birth control pills are used correctly, the chances of getting pregnant are very low  Birth control pills may help decrease bleeding and pain during your monthly period  They may also help prevent cancer of the uterus and ovaries  Disadvantages of birth control pills: You may have sudden changes in your mood or feelings while you take birth control pills  You may have nausea and decreased sex drive  You may have an increased appetite and rapid weight gain  You may also have bleeding in between periods, less frequent periods, vaginal dryness, and breast pain  Birth control pills will not protect you from sexually transmitted infections  Rarely, some birth control pills can increase your risk for a blood clot  This may become life-threatening  If you want to get pregnant: If you are planning to have a baby, ask your healthcare provider when you may stop taking your birth control pills  It may take some time for you to start ovulating again  Ask your healthcare provider for more information about pregnancy after birth control pills  When to start taking birth control pills after you have a baby:   If you are not breastfeeding, you may start taking birth control pills 3 weeks after you give birth  You may be able to take certain types of birth control pills if you are breastfeeding  These pills can be started from 6 weeks to 6 months after you give birth  Ask your healthcare provider for more information about when to start taking birth control pills after you give birth  Contact your healthcare provider if:   You have forgotten to take a birth control pill  You have mood changes, such as depression, since starting birth control pills  You have nausea or you are vomiting  You have severe abdominal pain  You missed a period and have questions or concerns about being pregnant  You still have bleeding 4 months after taking birth control pills correctly  You have questions or concerns about your condition or care  Return to the emergency department if:   Your arm or leg feels warm, tender, and painful  It may look swollen and red  You feel lightheaded, short of breath, and have chest pain  You cough up blood  You have any of the following signs of a stroke:      Numbness or drooping on one side of your face     Weakness in an arm or leg    Confusion or difficulty speaking    Dizziness, a severe headache, or vision loss    You have severe pain, numbness, or swelling in your arms or legs  © 2017 [facility] Information is for End User's use only and may not be sold, redistributed or otherwise used for commercial purposes  All illustrations and images included in CareNotes® are the copyrighted property of [employer] , [employer]  or Remington Hernandez  The above information is an  only  It is not intended as medical advice for individual conditions or treatments  Talk to your doctor, nurse or pharmacist before following any medical regimen to see if it is safe and effective for you  Missed or Late Pills:  For combined (Estrogen and Progestin) pills:    If one hormonal pill is LATE (<24 hours since a pill should have been taken): Take the late or missed pill as soon as possible  Continue taking the remaining pills at the usual time (even if it means taking two pills on the same day)  No additional contraceptive protection is needed  Emergency contraception is not usually needed but can be considered if hormonal pills were missed earlier in the cycle or in the last week of the previous cycle  If one hormonal pill has been MISSED (24 to <48 hours since a pill should have been taken): Take the late or missed pill as soon as possible  Continue taking the remaining pills at the usual time (even if it means taking two pills on the same day)  No additional contraceptive protection is needed  Emergency contraception is not usually needed but can be considered if hormonal pills were missed earlier in the cycle or in the last week of the previous cycle  If two or more consecutive hormonal pills have been missed: (less than or equal to 48 hours since a pill should have been taken): Take the most recent missed pill as soon as possible  (Any other missed pills should be discarded ) Continue taking the remaining pills at the usual time (even if it means taking two pills on the same day)  Use back-up contraception (e g , condoms) or avoid sexual intercourse until hormonal pills have been taken for 7 consecutive days  If pills were missed in the last week of hormonal pills (e g , days 15-21 for 28-day pill packs): Omit the hormone-free interval by finishing the horomal pills in the current pack and starting a new pack the next day  If unable to start a new pack immediately, use back-up contraception (e g , condoms) or avoid sexual intercourse until hormonal pills from a new pack have been taken for 7 consecutive days  Emergency contraception should be considered if hormonal pills were missed during the first week and unprotected sexual intercourse occurred in the previous 5 days   Emergency contraception may also be considered at other times as appropriate  Source: [place]  Selected Practice Recommendations for Contraceptive Use, 2013  COVID-19 Instructions    If you are having any of the following:  Cough   Shortness of breath   Fever  If traveled within past 2 weeks internationally or to high risk [place] states  Or been in contact with someone that has     Please call either:   Your PCP office  [phone], option [phone]    They will screen you over the phone and direct you to the nearest appropriate testing location    DO NOT go to your PCP or OB office without calling first       Jaden Winchester

## 2022-05-24 NOTE — LETTER
May 31, 2022    Tio Alba  [address] [address]          2022    To Tio Alba  : 2000      This letter is to advise you that your recent PAP SMEAR results were reviewed by me and are NORMAL    We will see you in 1 year for your annual exam     Rubi Flores

## 2022-05-28 LAB
C TRACH DNA SPEC QL NAA+PROBE: NEGATIVE
N GONORRHOEA DNA SPEC QL NAA+PROBE: NEGATIVE

## 2022-05-31 LAB
LAB AP GYN PRIMARY INTERPRETATION: NORMAL
Lab: NORMAL

## 2022-06-06 ENCOUNTER — APPOINTMENT (EMERGENCY)
Dept: RADIOLOGY | Facility: HOSPITAL | Age: 22
End: 2022-06-06
Payer: MEDICARE

## 2022-06-06 ENCOUNTER — HOSPITAL ENCOUNTER (EMERGENCY)
Facility: HOSPITAL | Age: 22
Discharge: HOME | End: 2022-06-07
Attending: EMERGENCY MEDICINE | Admitting: EMERGENCY MEDICINE
Payer: MEDICARE

## 2022-06-06 VITALS
RESPIRATION RATE: 18 BRPM | OXYGEN SATURATION: 100 % | SYSTOLIC BLOOD PRESSURE: 125 MMHG | DIASTOLIC BLOOD PRESSURE: 63 MMHG | TEMPERATURE: 98.4 F | HEART RATE: 94 BPM | WEIGHT: 119.49 LBS | BODY MASS INDEX: 21.85 KG/M2

## 2022-06-06 DIAGNOSIS — B34.9 VIRAL SYNDROME: Primary | ICD-10-CM

## 2022-06-06 LAB
ALBUMIN SERPL BCP-MCNC: 4.2 G/DL (ref 3–5.2)
ALP SERPL-CCNC: 63 U/L (ref 43–122)
ALT SERPL W P-5'-P-CCNC: 16 U/L
ANION GAP SERPL CALCULATED.3IONS-SCNC: 8 MMOL/L (ref 5–14)
AST SERPL W P-5'-P-CCNC: 25 U/L (ref 14–36)
BASOPHILS # BLD AUTO: 0.02 THOUSANDS/ΜL (ref 0–0.1)
BASOPHILS NFR BLD AUTO: 0 % (ref 0–1)
BILIRUB SERPL-MCNC: 1.18 MG/DL
BUN SERPL-MCNC: 10 MG/DL (ref 5–25)
CALCIUM SERPL-MCNC: 8.8 MG/DL (ref 8.4–10.2)
CHLORIDE SERPL-SCNC: 106 MMOL/L (ref 97–108)
CO2 SERPL-SCNC: 23 MMOL/L (ref 22–30)
CREAT SERPL-MCNC: 0.58 MG/DL (ref 0.6–1.2)
EOSINOPHIL # BLD AUTO: 0.09 THOUSAND/ΜL (ref 0–0.61)
EOSINOPHIL NFR BLD AUTO: 1 % (ref 0–6)
ERYTHROCYTE [DISTWIDTH] IN BLOOD BY AUTOMATED COUNT: 11.7 % (ref 11.6–15.1)
EXT PREG TEST URINE: NEGATIVE
EXT. CONTROL ED NAV: NORMAL
FLUAV RNA RESP QL NAA+PROBE: NEGATIVE
FLUBV RNA RESP QL NAA+PROBE: NEGATIVE
GFR SERPL CREATININE-BSD FRML MDRD: 132 ML/MIN/1.73SQ M
GLUCOSE SERPL-MCNC: 103 MG/DL (ref 70–99)
HCT VFR BLD AUTO: 42 % (ref 34.8–46.1)
HGB BLD-MCNC: 14.5 G/DL (ref 11.5–15.4)
IMM GRANULOCYTES # BLD AUTO: 0.03 THOUSAND/UL (ref 0–0.2)
IMM GRANULOCYTES NFR BLD AUTO: 0 % (ref 0–2)
LIPASE SERPL-CCNC: 86 U/L (ref 23–300)
LYMPHOCYTES # BLD AUTO: 1.03 THOUSANDS/ΜL (ref 0.6–4.47)
LYMPHOCYTES NFR BLD AUTO: 10 % (ref 14–44)
MAGNESIUM SERPL-MCNC: 1.8 MG/DL (ref 1.6–2.3)
MCH RBC QN AUTO: 30.6 PG (ref 26.8–34.3)
MCHC RBC AUTO-ENTMCNC: 34.5 G/DL (ref 31.4–37.4)
MCV RBC AUTO: 89 FL (ref 82–98)
MONOCYTES # BLD AUTO: 0.34 THOUSAND/ΜL (ref 0.17–1.22)
MONOCYTES NFR BLD AUTO: 3 % (ref 4–12)
NEUTROPHILS # BLD AUTO: 8.78 THOUSANDS/ΜL (ref 1.85–7.62)
NEUTS SEG NFR BLD AUTO: 86 % (ref 43–75)
NRBC BLD AUTO-RTO: 0 /100 WBCS
PHOSPHATE SERPL-MCNC: 2.9 MG/DL (ref 2.5–4.8)
PLATELET # BLD AUTO: 228 THOUSANDS/UL (ref 149–390)
PMV BLD AUTO: 9.2 FL (ref 8.9–12.7)
POTASSIUM SERPL-SCNC: 3.9 MMOL/L (ref 3.6–5)
PROT SERPL-MCNC: 8 G/DL (ref 5.9–8.4)
RBC # BLD AUTO: 4.74 MILLION/UL (ref 3.81–5.12)
RSV RNA RESP QL NAA+PROBE: NEGATIVE
SARS-COV-2 RNA RESP QL NAA+PROBE: NEGATIVE
SODIUM SERPL-SCNC: 137 MMOL/L (ref 137–147)
WBC # BLD AUTO: 10.29 THOUSAND/UL (ref 4.31–10.16)

## 2022-06-06 PROCEDURE — 99284 EMERGENCY DEPT VISIT MOD MDM: CPT | Performed by: EMERGENCY MEDICINE

## 2022-06-06 PROCEDURE — 96365 THER/PROPH/DIAG IV INF INIT: CPT

## 2022-06-06 PROCEDURE — 81003 URINALYSIS AUTO W/O SCOPE: CPT | Performed by: EMERGENCY MEDICINE

## 2022-06-06 PROCEDURE — 84100 ASSAY OF PHOSPHORUS: CPT | Performed by: EMERGENCY MEDICINE

## 2022-06-06 PROCEDURE — 36415 COLL VENOUS BLD VENIPUNCTURE: CPT | Performed by: EMERGENCY MEDICINE

## 2022-06-06 PROCEDURE — 81025 URINE PREGNANCY TEST: CPT | Performed by: EMERGENCY MEDICINE

## 2022-06-06 PROCEDURE — 0241U HB NFCT DS VIR RESP RNA 4 TRGT: CPT | Performed by: EMERGENCY MEDICINE

## 2022-06-06 PROCEDURE — 71045 X-RAY EXAM CHEST 1 VIEW: CPT

## 2022-06-06 PROCEDURE — 99284 EMERGENCY DEPT VISIT MOD MDM: CPT

## 2022-06-06 PROCEDURE — 85025 COMPLETE CBC W/AUTO DIFF WBC: CPT | Performed by: EMERGENCY MEDICINE

## 2022-06-06 PROCEDURE — 83735 ASSAY OF MAGNESIUM: CPT | Performed by: EMERGENCY MEDICINE

## 2022-06-06 PROCEDURE — 96366 THER/PROPH/DIAG IV INF ADDON: CPT

## 2022-06-06 PROCEDURE — 83690 ASSAY OF LIPASE: CPT | Performed by: EMERGENCY MEDICINE

## 2022-06-06 PROCEDURE — 81001 URINALYSIS AUTO W/SCOPE: CPT | Performed by: EMERGENCY MEDICINE

## 2022-06-06 PROCEDURE — 80053 COMPREHEN METABOLIC PANEL: CPT | Performed by: EMERGENCY MEDICINE

## 2022-06-06 PROCEDURE — 96375 TX/PRO/DX INJ NEW DRUG ADDON: CPT

## 2022-06-06 RX ORDER — KETOROLAC TROMETHAMINE 30 MG/ML
15 INJECTION, SOLUTION INTRAMUSCULAR; INTRAVENOUS ONCE
Status: COMPLETED | OUTPATIENT
Start: 2022-06-06 | End: 2022-06-06

## 2022-06-06 RX ORDER — ONDANSETRON 2 MG/ML
4 INJECTION INTRAMUSCULAR; INTRAVENOUS ONCE
Status: COMPLETED | OUTPATIENT
Start: 2022-06-06 | End: 2022-06-06

## 2022-06-06 RX ADMIN — ONDANSETRON 4 MG: 2 INJECTION INTRAMUSCULAR; INTRAVENOUS at 22:39

## 2022-06-06 RX ADMIN — SODIUM CHLORIDE, SODIUM LACTATE, POTASSIUM CHLORIDE, AND CALCIUM CHLORIDE 1000 ML: .6; .31; .03; .02 INJECTION, SOLUTION INTRAVENOUS at 22:38

## 2022-06-06 RX ADMIN — KETOROLAC TROMETHAMINE 15 MG: 30 INJECTION, SOLUTION INTRAMUSCULAR; INTRAVENOUS at 23:55

## 2022-06-06 NOTE — Clinical Note
Rafael Alexis was seen and treated in our emergency department on 6/6/2022  Diagnosis:     Marian Hernández  may return to work on return date  She may return on this date: 06/09/2022         If you have any questions or concerns, please don't hesitate to call        Artice Snellen, MD    ______________________________           _______________          _______________  Hospital Representative                              Date                                Time

## 2022-06-07 LAB
BACTERIA UR QL AUTO: ABNORMAL /HPF
BILIRUB UR QL STRIP: NEGATIVE
CLARITY UR: CLEAR
COLOR UR: YELLOW
GLUCOSE UR STRIP-MCNC: NEGATIVE MG/DL
HGB UR QL STRIP.AUTO: 250
KETONES UR STRIP-MCNC: NEGATIVE MG/DL
LEUKOCYTE ESTERASE UR QL STRIP: NEGATIVE
NITRITE UR QL STRIP: NEGATIVE
NON-SQ EPI CELLS URNS QL MICRO: ABNORMAL /HPF
PH UR STRIP.AUTO: 7 [PH]
PROT UR STRIP-MCNC: NEGATIVE MG/DL
RBC #/AREA URNS AUTO: ABNORMAL /HPF
SP GR UR STRIP.AUTO: 1.01 (ref 1–1.04)
UROBILINOGEN UA: NEGATIVE MG/DL
WBC #/AREA URNS AUTO: ABNORMAL /HPF

## 2022-06-07 RX ORDER — ACETAMINOPHEN 500 MG
500 TABLET ORAL EVERY 4 HOURS PRN
Qty: 30 TABLET | Refills: 0 | Status: SHIPPED | OUTPATIENT
Start: 2022-06-07

## 2022-06-07 NOTE — ED PROVIDER NOTES
History  Chief Complaint   Patient presents with    Flu Symptoms     States hasn't been feeling well since this morning  Nausea, generalized weakness, shortness of breath, body aches  81-year-old female presents emergency room with body aches since this morning  Also notes nausea and generalized weakness  No fevers chills cough congestion rhinorrhea  No chest pain  Some shortness of breath  Some epigastric abdominal pain  History provided by:  Patient  Flu Symptoms  Presenting symptoms: cough and fatigue    Presenting symptoms: no fever, no shortness of breath, no sore throat and no vomiting    Severity:  Moderate  Onset quality:  Gradual  Duration:  1 day  Progression:  Worsening  Chronicity:  New  Relieved by:  Nothing  Worsened by:  Nothing  Ineffective treatments:  None tried  Associated symptoms: no chills and no ear pain        Prior to Admission Medications   Prescriptions Last Dose Informant Patient Reported? Taking? SUMAtriptan (Imitrex) 25 mg tablet   No No   Sig: Take 0 5 tablets (12 5 mg total) by mouth once as needed for migraine   acetaminophen (TYLENOL) 650 mg CR tablet   No No   Sig: Take 1 tablet (650 mg total) by mouth every 8 (eight) hours as needed for mild pain   methylPREDNISolone 4 MG tablet therapy pack   Yes No   Sig: TAKE 6 TABLETS ON DAY 1 AS DIRECTED ON PACKAGE AND DECREASE BY 1 TAB EACH DAY FOR A TOTAL OF 6 DAYS   mirtazapine (REMERON) 7 5 MG tablet   No No   Sig: TAKE 1 TABLET (7 5 MG TOTAL) BY MOUTH DAILY AT BEDTIME   naproxen (Naprosyn) 500 mg tablet   No No   Sig: Take 1 tablet (500 mg total) by mouth 2 (two) times a day with meals   norgestimate-ethinyl estradiol (ORTHO-CYCLEN) 0 25-35 MG-MCG per tablet   No No   Sig: Take 1 tablet by mouth in the morning        Facility-Administered Medications: None       Past Medical History:   Diagnosis Date    ADHD (attention deficit hyperactivity disorder)     last seen 1 year ago, no recent RX, was on adderal, off at least 1 year     Migraine     OCD (obsessive compulsive disorder)     no treatment       History reviewed  No pertinent surgical history  Family History   Problem Relation Age of Onset   Maria D Yates Migraines Mother     ADD / ADHD Father     Heart disease Maternal Grandmother     Diabetes Maternal Grandmother      I have reviewed and agree with the history as documented  E-Cigarette/Vaping     E-Cigarette/Vaping Substances     Social History     Tobacco Use    Smoking status: Never Smoker    Smokeless tobacco: Never Used   Substance Use Topics    Alcohol use: No    Drug use: Not Currently     Types: Marijuana       Review of Systems   Constitutional: Positive for fatigue  Negative for chills and fever  HENT: Negative for ear pain and sore throat  Eyes: Negative for pain and visual disturbance  Respiratory: Positive for cough  Negative for shortness of breath  Cardiovascular: Negative for chest pain and palpitations  Gastrointestinal: Negative for abdominal pain and vomiting  Genitourinary: Negative for dysuria and hematuria  Musculoskeletal: Negative for arthralgias and back pain  Skin: Negative for color change and rash  Neurological: Negative for seizures and syncope  All other systems reviewed and are negative  Physical Exam  Physical Exam  Vitals and nursing note reviewed  Constitutional:       General: She is not in acute distress  Appearance: She is well-developed  HENT:      Head: Normocephalic and atraumatic  Nose: Nose normal       Mouth/Throat:      Mouth: Mucous membranes are moist    Eyes:      Extraocular Movements: Extraocular movements intact  Conjunctiva/sclera: Conjunctivae normal       Pupils: Pupils are equal, round, and reactive to light  Cardiovascular:      Rate and Rhythm: Normal rate and regular rhythm  Heart sounds: No murmur heard  Pulmonary:      Effort: Pulmonary effort is normal  No respiratory distress        Breath sounds: Normal breath sounds  Abdominal:      General: Abdomen is flat  Palpations: Abdomen is soft  Tenderness: There is no abdominal tenderness  Musculoskeletal:      Cervical back: Neck supple  Skin:     General: Skin is warm and dry  Capillary Refill: Capillary refill takes less than 2 seconds  Neurological:      Mental Status: She is alert           Vital Signs  ED Triage Vitals [06/06/22 2223]   Temperature Pulse Respirations Blood Pressure SpO2   98 4 °F (36 9 °C) 94 18 125/63 100 %      Temp Source Heart Rate Source Patient Position - Orthostatic VS BP Location FiO2 (%)   Oral Monitor Sitting Left arm --      Pain Score       8           Vitals:    06/06/22 2223   BP: 125/63   Pulse: 94   Patient Position - Orthostatic VS: Sitting         Visual Acuity      ED Medications  Medications   lactated ringers bolus 1,000 mL (0 mL Intravenous Stopped 6/7/22 0053)   ondansetron (ZOFRAN) injection 4 mg (4 mg Intravenous Given 6/6/22 2239)   ketorolac (TORADOL) injection 15 mg (15 mg Intravenous Given 6/6/22 2355)       Diagnostic Studies  Results Reviewed     Procedure Component Value Units Date/Time    Urine Microscopic [[id number]]  (Abnormal) Collected: 06/06/22 2356    Lab Status: Final result Specimen: Urine, Clean Catch Updated: 06/07/22 0007     RBC, UA 4-10 /hpf      WBC, UA 1-2 /hpf      Epithelial Cells Occasional /hpf      Bacteria, UA Occasional /hpf     UA w Reflex to Microscopic w Reflex to Culture [[id number]]  (Abnormal) Collected: 06/06/22 2356    Lab Status: Final result Specimen: Urine, Clean Catch Updated: 06/07/22 0007     Color, UA Yellow     Clarity, UA Clear     Specific Gravity, UA 1 010     pH, UA 7 0     Leukocytes, UA Negative     Nitrite, UA Negative     Protein, UA Negative mg/dl      Glucose, UA Negative mg/dl      Ketones, UA Negative mg/dl      Bilirubin, UA Negative     Blood,  0     UROBILINOGEN UA Negative mg/dL     POCT pregnancy, urine [[id number]]  (Normal) Resulted: 06/06/22 2356    Lab Status: Final result Updated: 06/06/22 2356     EXT PREG TEST UR (Ref: Negative) negative     Control valid    COVID/FLU/RSV - 2 hour TAT [[id number]]  (Normal) Collected: 06/06/22 2239    Lab Status: Final result Specimen: Nares from Nose Updated: 06/06/22 2331     SARS-CoV-2 Negative     INFLUENZA A PCR Negative     INFLUENZA B PCR Negative     RSV PCR Negative    Narrative:      FOR PEDIATRIC PATIENTS - copy/paste COVID Guidelines URL to browser: [email or web address]  [employer]apter[employer]    SARS-CoV-2 assay is a Nucleic Acid Amplification assay intended for the  qualitative detection of nucleic acid from SARS-CoV-2 in nasopharyngeal  swabs  Results are for the presumptive identification of SARS-CoV-2 RNA  Positive results are indicative of infection with SARS-CoV-2, the virus  causing COVID-19, but do not rule out bacterial infection or co-infection  with other viruses  Laboratories within the [place] and its  territories are required to report all positive results to the appropriate  public health authorities  Negative results do not preclude SARS-CoV-2  infection and should not be used as the sole basis for treatment or other  patient management decisions  Negative results must be combined with  clinical observations, patient history, and epidemiological information  This test has not been FDA cleared or approved  This test has been authorized by FDA under an Emergency Use Authorization  (EUA)  This test is only authorized for the duration of time the  declaration that circumstances exist justifying the authorization of the  emergency use of an in vitro diagnostic tests for detection of SARS-CoV-2  virus and/or diagnosis of COVID-19 infection under section 564(b)(1) of  the Act, 21 U  S C  357XCS-4(L)(9), unless the authorization is terminated  or revoked sooner  The test has been validated but independent review by FDA  and CLIA is pending      Test performed using Cepheid GeneXpert: This RT-PCR assay targets N2,  a region unique to SARS-CoV-2  A conserved region in the E-gene was chosen  for pan-Sarbecovirus detection which includes SARS-CoV-2      Comprehensive metabolic panel [[id number]]  (Abnormal) Collected: 06/06/22 2239    Lab Status: Final result Specimen: Blood from Arm, Right Updated: 06/06/22 2257     Sodium 137 mmol/L      Potassium 3 9 mmol/L      Chloride 106 mmol/L      CO2 23 mmol/L      ANION GAP 8 mmol/L      BUN 10 mg/dL      Creatinine 0 58 mg/dL      Glucose 103 mg/dL      Calcium 8 8 mg/dL      AST 25 U/L      ALT 16 U/L      Alkaline Phosphatase 63 U/L      Total Protein 8 0 g/dL      Albumin 4 2 g/dL      Total Bilirubin 1 18 mg/dL      eGFR 132 ml/min/1 73sq m     Narrative:      [place] guidelines for Chronic Kidney Disease (CKD):     Stage 1 with normal or high GFR (GFR > 90 mL/min/1 73 square meters)    Stage 2 Mild CKD (GFR = 60-89 mL/min/1 73 square meters)    Stage 3A Moderate CKD (GFR = 45-59 mL/min/1 73 square meters)    Stage 3B Moderate CKD (GFR = 30-44 mL/min/1 73 square meters)    Stage 4 Severe CKD (GFR = 15-29 mL/min/1 73 square meters)    Stage 5 End Stage CKD (GFR <15 mL/min/1 73 square meters)  Note: GFR calculation is accurate only with a steady state creatinine    Magnesium [[id number]]  (Normal) Collected: 06/06/22 2239    Lab Status: Final result Specimen: Blood from Arm, Right Updated: 06/06/22 2257     Magnesium 1 8 mg/dL     Phosphorus [[id number]]  (Normal) Collected: 06/06/22 2239    Lab Status: Final result Specimen: Blood from Arm, Right Updated: 06/06/22 2257     Phosphorus 2 9 mg/dL     Lipase [[id number]]  (Normal) Collected: 06/06/22 2239    Lab Status: Final result Specimen: Blood from Arm, Right Updated: 06/06/22 2257     Lipase 86 u/L     CBC and differential [[id number]]  (Abnormal) Collected: 06/06/22 2239    Lab Status: Final result Specimen: Blood from Arm, Right Updated: 06/06/22 2247     WBC 10 29 Thousand/uL      RBC 4 74 Million/uL      Hemoglobin 14 5 g/dL      Hematocrit 42 0 %      MCV 89 fL      MCH 30 6 pg      MCHC 34 5 g/dL      RDW 11 7 %      MPV 9 2 fL      Platelets 458 Thousands/uL      nRBC 0 /100 WBCs      Neutrophils Relative 86 %      Immat GRANS % 0 %      Lymphocytes Relative 10 %      Monocytes Relative 3 %      Eosinophils Relative 1 %      Basophils Relative 0 %      Neutrophils Absolute 8 78 Thousands/µL      Immature Grans Absolute 0 03 Thousand/uL      Lymphocytes Absolute 1 03 Thousands/µL      Monocytes Absolute 0 34 Thousand/µL      Eosinophils Absolute 0 09 Thousand/µL      Basophils Absolute 0 02 Thousands/µL                  XR chest 1 view portable   ED Interpretation by Sherin Jain MD (06/06 3458)   No pna no ptx                 Procedures  Procedures         ED Course                                             MDM  Number of Diagnoses or Management Options  Viral syndrome  Diagnosis management comments: Feels much improved of after IV fluids, normal laboratory evaluation, normal chest x-ray  Negative viral testing  Will discharge  Disposition  Final diagnoses:   Viral syndrome     Time reflects when diagnosis was documented in both MDM as applicable and the Disposition within this note     Time User Action Codes Description Comment    6/7/2022 12:47 AM Yasmine Dunlap Add [B34 9] Viral syndrome       ED Disposition     ED Disposition   Discharge    Condition   Stable    Date/Time   Tue Jun 7, 2022 12:47 AM    Comment   [address] discharge to home/self care                 Follow-up Information     Follow up With Specialties Details Why Contact Info    Graeme Dixon, [id number], Nurse Practitioner   [id number] [facility]  [place] [address] [facility]  [phone]            Discharge Medication List as of 6/7/2022 12:48 AM      START taking these medications    Details   acetaminophen (TYLENOL) 500 mg tablet Take 1 tablet (500 mg total) by mouth every 4 (four) hours as needed for mild pain, headaches or fever, Starting Tue 6/7/2022, Normal         CONTINUE these medications which have NOT CHANGED    Details   acetaminophen (TYLENOL) 650 mg CR tablet Take 1 tablet (650 mg total) by mouth every 8 (eight) hours as needed for mild pain, Starting Mon 2/21/2022, Normal      methylPREDNISolone 4 MG tablet therapy pack TAKE 6 TABLETS ON DAY 1 AS DIRECTED ON PACKAGE AND DECREASE BY 1 TAB EACH DAY FOR A TOTAL OF 6 DAYS, Historical Med      mirtazapine (REMERON) 7 5 MG tablet TAKE 1 TABLET (7 5 MG TOTAL) BY MOUTH DAILY AT BEDTIME, Starting Wed 3/16/2022, Normal      naproxen (Naprosyn) 500 mg tablet Take 1 tablet (500 mg total) by mouth 2 (two) times a day with meals, Starting Mon 2/21/2022, Normal      norgestimate-ethinyl estradiol (ORTHO-CYCLEN) 0 25-35 MG-MCG per tablet Take 1 tablet by mouth in the morning , Starting Tue 5/24/2022, Normal      SUMAtriptan (Imitrex) 25 mg tablet Take 0 5 tablets (12 5 mg total) by mouth once as needed for migraine, Starting Wed 5/18/2022, Normal             No discharge procedures on file      PDMP Review     None          ED Provider  Electronically Signed by           Arun King MD  06/07/22 9401

## 2022-08-09 ENCOUNTER — TELEPHONE (OUTPATIENT)
Dept: NEUROLOGY | Facility: CLINIC | Age: 22
End: 2022-08-09

## 2022-08-09 NOTE — TELEPHONE ENCOUNTER
Called patient and left voicemail to confirm their upcoming appointment with Dr Pau Ortega  Informed patient about arriving in the [place] location 15 minutes prior to appointment to get checked in and go over chart

## 2022-08-15 ENCOUNTER — CONSULT (OUTPATIENT)
Dept: NEUROLOGY | Facility: CLINIC | Age: 22
End: 2022-08-15
Payer: MEDICARE

## 2022-08-15 VITALS
BODY MASS INDEX: 23 KG/M2 | SYSTOLIC BLOOD PRESSURE: 110 MMHG | HEART RATE: 76 BPM | TEMPERATURE: 98.2 F | WEIGHT: 125 LBS | HEIGHT: 62 IN | DIASTOLIC BLOOD PRESSURE: 74 MMHG

## 2022-08-15 DIAGNOSIS — G43.009 MIGRAINE WITHOUT AURA AND WITHOUT STATUS MIGRAINOSUS, NOT INTRACTABLE: Primary | ICD-10-CM

## 2022-08-15 PROCEDURE — 99245 OFF/OP CONSLTJ NEW/EST HI 55: CPT | Performed by: PSYCHIATRY & NEUROLOGY

## 2022-08-15 RX ORDER — RIZATRIPTAN BENZOATE 10 MG/1
10 TABLET ORAL ONCE AS NEEDED
Qty: 9 TABLET | Refills: 6 | Status: SHIPPED | OUTPATIENT
Start: 2022-08-15

## 2022-08-15 NOTE — PATIENT INSTRUCTIONS
Headache/migraine treatment:   Rescue medications (for immediate treatment of a headache): It is ok to take ibuprofen, acetaminophen or naproxen (Advil, Tylenol,  Aleve, Excedrin) if they help your headaches you should limit these to No more than 3 times a week to avoid medication overuse/rebound headaches  For your more moderate to severe migraines take this medication early   Maxalt (rizatriptan) 10mg tabs - take one at the onset of headache  May repeat one time after 2 hours if pain has not resolved  (Max 2 a day and 9 a month)     Over the counter preventive supplements for headaches/migraines (if you try, try for 3 months straight)  (to take every day to help prevent headaches - not to take at the time of headache): There are combo pills online of these - none of which regulated by FDA and double check dosing - take appropriate dose only once a day- preventa migraine, migravent, mind ease, migrelief   [] Magnesium 400mg daily (If any diarrhea or upset stomach, decrease dose  as tolerated)  [] Riboflavin (Vitamin B2) 400mg daily - try online   (FYI B2 may make your urine bright/neon yellow)  AND/OR  [] Herbal medication: Petasites/Butterbur 150 mg daily - try online  (When choosing your Butterbur online or in the store, beware that there are some poor preps containing pyrrolizidine alkaloids (PAs) that can be harmful to the liver  Therefore, do not use butterbur products that are not labeled as PA-free )    Prescription preventive medications for headaches/migraines   (to take every day to help prevent headaches - not to take at the time of headache):  [x] first try the mirtazapine for 3 month trial straight and if you wanted another preventative for migraine could consider propranolol low dose or next after emgality         -----------------  Emgality/Galcanezumab - the 1st dose is 240 mg loading dose of 2 consecutive 120 mg injections    Thereafter, 120 mg injections every 30 days    If needed there is a coupon card for the copay at [employer]     READ INSTRUCTIONS that come with the medication  REFRIGERATE  Keep out of direct sunlight  Prior to administration, allow to come to room temperature for 30 minutes  Do not warm using a heat source (eg, microwave or hot water)  Do not shake  Administer in preferably abdomen (avoiding 2 inches around the navel), thigh, upper arm, or buttocks avoiding areas of skin that are tender, bruised, red or hard  Deliver entire contents of single-use prefilled pen or syringe  Unknown impact in pregnancy therefore would recommend stopping 6 months prior to considering pregnancy  *Typically these types of medications take time until you see the benefit, although some may see improvement in days, often it may take weeks, especially if the medication is being titrated up to a beneficial level  Please contact us if there are any concerns or questions regarding the medication  Lifestyle Recommendations:  [x] SLEEP - Maintain a regular sleep schedule: Adults need at least 7-8 hours of uninterrupted a night  Maintain good sleep hygiene:  Going to bed and waking up at consistent times, avoiding excessive daytime naps, avoiding caffeinated beverages in the evening, avoid excessive stimulation in the evening and generally using bed primarily for sleeping  One hour before bedtime would recommend turning lights down lower, decreasing your activity (may read quietly, listen to music at a low volume)  When you get into bed, should eliminate all technology (no texting, emailing, playing with your phone, iPad or tablet in bed)  [x] HYDRATION - Maintain good hydration  Drink  2L of fluid a day (4 typical small water bottles)  [x] DIET - Maintain good nutrition  In particular don't skip meals and try and eat healthy balanced meals regularly  [x] TRIGGERS - Look for other triggers and avoid them: Limit caffeine to 1-2 cups a day or less   Avoid dietary triggers that you have noticed bring on your headaches (this could include aged cheese, peanuts, MSG, aspartame and nitrates)  [x] EXERCISE - physical exercise as we all know is good for you in many ways, and not only is good for your heart, but also is beneficial for your mental health, cognitive health and  chronic pain/headaches  I would encourage at the least 5 days of physical exercise weekly for at least 30 minutes  Education and Follow-up  [x] Please call with any questions or concerns  Of course if any new concerning symptoms go to the emergency department    [x] Follow up 3-4 months, sooner if needed

## 2022-08-15 NOTE — PROGRESS NOTES
[facility] Consult  PATIENT:  Alecia Gilliam  MRN:  [id number]  :  2000  DATE OF SERVICE:  8/15/2022  REFERRED BY: SIL Rosen  PMD: SIL Gunn    Assessment/Plan:     Alecia Gilliam is a delightful  25 y o  female with a past medical history that includes Migraine, anxiety, ADHD, Depression, OCD, intermittent palpitations referred here for evaluation of headache  My initial evaluation 8/15/2022    Migraine without aura and without status migrainosus, not intractable  She reports a long history of headaches and migraines dating back to her teens as well as a family history in mom  She has followed with peds neuro in the past and has had MRI Brain  She reports pain is typical right frontal/temporal/retro-orbital, without aura and with typical associated migrainous features  - as of 8/15/2022: migraines frequency better lately, on average 10 a month  She is currently on mirtazapine for mood/sleep but only takes occasionally and we discussed first would try this consistently to see if treatment of the latter could also decrease migraines and get sleep back on track prior to work starting soon  Otherwise we discussed could consider low dose propranolol if tolerated or CGRP med for prevention  Trial of rizatriptan for abortive  Workup:  - Neurologic assessment reveals unremarkable neurological exam   - MRI Brain wo contrast 22: A few small scattered white matter hyperintensities are seen within the frontal white matter bilaterally  These are nonspecific for a patient of this age      Preventative:  - we discussed headache hygiene and lifestyle factors that may improve headaches  - Currently on through other providers: mirtazapine   - Past/ failed/contraindicated: amitriptyline   - future options: propranolol, CGRP med, botox    Rescue:  - discussed not taking over-the-counter or prescription pain medications more than 3 days per week to prevent medication overuse/rebound headache  -     rizatriptan (MAXALT) 10 mg tablet; Take 1 tablet (10 mg total) by mouth once as needed for migraine May repeat in 2 hours if needed  Max 2/24 hours, 9/month  Discussed proper use, possible side effects and risks  - Past/ failed/contraindicated: sumatriptan 25 mg worked sometimes, I do not recommend fioricet  - past/helped: migraine cocktails in ED, steroids, toradol IM   - future options:   prochlorperazine, Toradol IM or p o , could consider trial of 5 days of Depakote 500 mg nightly or dexamethasone 2 mg daily for prolonged migraine, ubrelvy, reyvow, nurtec      Patient instructions          Headache/migraine treatment:   Rescue medications (for immediate treatment of a headache): It is ok to take ibuprofen, acetaminophen or naproxen (Advil, Tylenol,  Aleve, Excedrin) if they help your headaches you should limit these to No more than 3 times a week to avoid medication overuse/rebound headaches  For your more moderate to severe migraines take this medication early   Maxalt (rizatriptan) 10mg tabs - take one at the onset of headache  May repeat one time after 2 hours if pain has not resolved  (Max 2 a day and 9 a month)     Over the counter preventive supplements for headaches/migraines (if you try, try for 3 months straight)  (to take every day to help prevent headaches - not to take at the time of headache):   There are combo pills online of these - none of which regulated by FDA and double check dosing - take appropriate dose only once a day- preventa migraine, migravent, mind ease, migrelief   [] Magnesium 400mg daily (If any diarrhea or upset stomach, decrease dose  as tolerated)  [] Riboflavin (Vitamin B2) 400mg daily - try online   (FYI B2 may make your urine bright/neon yellow)  AND/OR  [] Herbal medication: Petasites/Butterbur 150 mg daily - try online  (When choosing your Butterbur online or in the store, beware that there are some poor preps containing pyrrolizidine alkaloids (PAs) that can be harmful to the liver  Therefore, do not use butterbur products that are not labeled as PA-free )    Prescription preventive medications for headaches/migraines   (to take every day to help prevent headaches - not to take at the time of headache):  [x] first try the mirtazapine for 3 month trial straight and if you wanted another preventative for migraine could consider propranolol low dose or next after that would be something like emgality         -----------------  Emgality/Galcanezumab - the 1st dose is 240 mg loading dose of 2 consecutive 120 mg injections  Thereafter, 120 mg injections every 30 days    If needed there is a coupon card for the copay at [employer]  com     READ INSTRUCTIONS that come with the medication  REFRIGERATE  Keep out of direct sunlight  Prior to administration, allow to come to room temperature for 30 minutes  Do not warm using a heat source (eg, microwave or hot water)  Do not shake  Administer in preferably abdomen (avoiding 2 inches around the navel), thigh, upper arm, or buttocks avoiding areas of skin that are tender, bruised, red or hard  Deliver entire contents of single-use prefilled pen or syringe  Unknown impact in pregnancy therefore would recommend stopping 6 months prior to considering pregnancy  *Typically these types of medications take time until you see the benefit, although some may see improvement in days, often it may take weeks, especially if the medication is being titrated up to a beneficial level  Please contact us if there are any concerns or questions regarding the medication  Lifestyle Recommendations:  [x] SLEEP - Maintain a regular sleep schedule: Adults need at least 7-8 hours of uninterrupted a night   Maintain good sleep hygiene:  Going to bed and waking up at consistent times, avoiding excessive daytime naps, avoiding caffeinated beverages in the evening, avoid excessive stimulation in the evening and generally using bed primarily for sleeping  One hour before bedtime would recommend turning lights down lower, decreasing your activity (may read quietly, listen to music at a low volume)  When you get into bed, should eliminate all technology (no texting, emailing, playing with your phone, iPad or tablet in bed)  [x] HYDRATION - Maintain good hydration  Drink  2L of fluid a day (4 typical small water bottles)  [x] DIET - Maintain good nutrition  In particular don't skip meals and try and eat healthy balanced meals regularly  [x] TRIGGERS - Look for other triggers and avoid them: Limit caffeine to 1-2 cups a day or less  Avoid dietary triggers that you have noticed bring on your headaches (this could include aged cheese, peanuts, MSG, aspartame and nitrates)  [x] EXERCISE - physical exercise as we all know is good for you in many ways, and not only is good for your heart, but also is beneficial for your mental health, cognitive health and  chronic pain/headaches  I would encourage at the least 5 days of physical exercise weekly for at least 30 minutes  Education and Follow-up  [x] Please call with any questions or concerns  Of course if any new concerning symptoms go to the emergency department  [x] Follow up 3-4 months, sooner if needed         CC: We had the pleasure of evaluating Escobar Espinoza in neurological consultation today  Escobar Espinoza is a   right handed female who presents today for evaluation of headaches  History obtained from patient as well as available medical record review  History of Present Illness:   Current medical illnesses  or past medical history include Migraine, anxiety, ADHD, Depression, OCD, intermittent palpitations     She has followed with peds neurology in the past     Headaches started at what age?  15-12 years old  How often do the headaches occur?   - as of 8/15/2022: waxes and wanes, better lately, on average 10 a month   What time of the day do the headaches start? No particular time of day  How long do the headaches last? Days   Are you ever headache free? Yes    Aura? without aura     Last eye exam: wears glasses, 3/2022 normal except for glasses    Where is your headache located and pain quality?   - more on right temple and then can go to right retro-orbital and stays, can be throbbing/pulstating and sharp at times   - once in blue moon on the left    What is the intensity of pain? Average: 10/10, worst 10/10  Associated symptoms:   [] Nausea       [] Vomiting     [x] decreased appetite     [x] Photophobia     [x]Phonophobia      [] Osmophobia  [] Blurred vision   [x] Light-headed or dizzy  - sometimes   [] Tinnitus   [] Hands or feet tingle or feel numb/paresthesias    [] Ptosis      [] Facial droop  [] Lacrimation  [] Nasal congestion - always     Things that make the headache worse? No specific movements, any quick head movement     Headache triggers:  Stress, hormonal    Have you seen someone else for headaches or pain? Yes, neurology in the past   Have you had trigger point injection performed and how often? No  Have you had Botox injection performed and how often? No   Have you had epidural injections or transforaminal injections performed? No  Are you current pregnant or planning on getting pregnant? Not for years, on [facility]   Have you ever had any Brain imaging? yes     What medications do you take or have you taken for your headaches?    ABORTIVE:    OTC medications have been ineffective     Sumatriptan 25 mg - no SE     Past   Steroids - no SE  toradol IM   fioricet  Migraine cocktail    PREVENTIVE:     Mirtazapine - been on it about a year, takes when has a headache to help her sleep     Past/ failed/contraindicated:  Amitriptyline 25 mg       LIFESTYLE  Sleep   - averages: delayed during summer/late night owl during summer, avg 6 hours, rec sleep cycle    Water: 2 bottles per day  Caffeine: none per day    Mood: anxiety, depression, OCD, ADHD dx by psychiatry in the past, now with PCP, anxiety is not great     The following portions of the patient's history were reviewed and updated as appropriate: allergies, current medications, past family history, past medical history, past social history, past surgical history and problem list     Pertinent family history:  Family history of headaches:  migraine headaches in mother  Any family history of aneurysms - No    Pertinent social history:  Work: [employer] fair starts again in Sept 2022  Lives with mom       Past Medical History:     Past Medical History:   Diagnosis Date    ADHD (attention deficit hyperactivity disorder)     last seen 1 year ago, no recent RX, was on adderal, off at least 1 year     Migraine     OCD (obsessive compulsive disorder)     no treatment       Patient Active Problem List   Diagnosis    Encounter for surveillance of contraceptive pills    ADHD (attention deficit hyperactivity disorder)    Depressed mood    OCD (obsessive compulsive disorder)    High risk heterosexual behavior    Other headache syndrome    Anxiety    Headache    Abnormal CBC    Intermittent palpitations    Poor weight gain in adult    Exposure to COVID-19 virus    Migraine without status migrainosus, not intractable       Medications:      Current Outpatient Medications   Medication Sig Dispense Refill    mirtazapine (REMERON) 7 5 MG tablet TAKE 1 TABLET (7 5 MG TOTAL) BY MOUTH DAILY AT BEDTIME 30 tablet 5    norgestimate-ethinyl estradiol (ORTHO-CYCLEN) 0 25-35 MG-MCG per tablet Take 1 tablet by mouth in the morning  28 tablet 11    rizatriptan (MAXALT) 10 mg tablet Take 1 tablet (10 mg total) by mouth once as needed for migraine May repeat in 2 hours if needed  Max 2/24 hours, 9/month   9 tablet 6    naproxen (Naprosyn) 500 mg tablet Take 1 tablet (500 mg total) by mouth 2 (two) times a day with meals (Patient not taking: Reported on 8/15/2022) 30 tablet 0     No current facility-administered medications for this visit  Allergies:    No Known Allergies    Family History:     Family History   Problem Relation Age of Onset   Roselia Mitchell Migraines Mother    Roselia Mitchell ADD / ADHD Father     Heart disease Maternal Grandmother     Diabetes Maternal Grandmother        Social History:       Social History     Socioeconomic History    Marital status: Single     Spouse name: Not on file    Number of children: Not on file    Years of education: Not on file    Highest education level: Not on file   Occupational History    Not on file   Tobacco Use    Smoking status: Never Smoker    Smokeless tobacco: Never Used   Vaping Use    Vaping Use: Never used   Substance and Sexual Activity    Alcohol use: No    Drug use: Not Currently     Types: Marijuana    Sexual activity: Yes     Birth control/protection: Pill     Comment: patient's cell [phone]; lives with Mom, may stay at boyfriends house [place]  Other Topics Concern    Not on file   Social History Narrative    Lives with mother and sister  Social Determinants of Health     Financial Resource Strain: Low Risk     Difficulty of Paying Living Expenses: Not hard at all   Food Insecurity: No Food Insecurity    Worried About Running Out of Food in the Last Year: Never true    Albert of Food in the Last Year: Never true   Transportation Needs: No Transportation Needs    Lack of Transportation (Medical): No    Lack of Transportation (Non-Medical): No   Physical Activity: Insufficiently Active    Days of Exercise per Week: 2 days    Minutes of Exercise per Session: 30 min   Stress: Not on file   Social Connections: Socially Isolated    Frequency of Communication with Friends and Family: Three times a week    Frequency of Social Gatherings with Friends and Family:  Three times a week    Attends Jewish Services: Never    Active Member of Clubs or Organizations: No    Attends Club or Organization Meetings: Never    Marital Status: Never  Intimate Partner Violence: Not At Risk    Fear of Current or Ex-Partner: No    Emotionally Abused: No    Physically Abused: No    Sexually Abused: No   Housing Stability: Low Risk     Unable to Pay for Housing in the Last Year: No    Number of Places Lived in the Last Year: 1    Unstable Housing in the Last Year: No         Objective:       Physical Exam:                                                                 Vitals:            Constitutional:    /74 (BP Location: Right arm, Patient Position: Sitting, Cuff Size: Standard)   Pulse 76   Temp 98 2 °F (36 8 °C) (Tympanic)   Ht 5' 2" (1 575 m)   Wt 56 7 kg (125 lb)   BMI 22 86 kg/m²   BP Readings from Last 3 Encounters:   08/15/22 110/74   06/06/22 125/63   05/24/22 106/72     Pulse Readings from Last 3 Encounters:   08/15/22 76   06/06/22 94   05/24/22 67         Well developed, well nourished, well groomed  No dysmorphic features  HEENT:  Normocephalic atraumatic  Oropharynx is clear and moist  No oral mucosal lesions  Chest:  Respirations regular and unlabored  Cardiovascular:  Distal extremities warm without palpable edema or tenderness, no observed significant swelling  Musculoskeletal:  (see below under neurologic exam for evaluation of motor function and gait)   Skin:  warm and dry, not diaphoretic  No apparent birthmarks or stigmata of neurocutaneous disease  Psychiatric:  Normal behavior and appropriate affect        Neurological Examination:     Mental status/cognitive function:   Recent and remote memory intact  Attention span and concentration as well as fund of knowledge are appropriate for age  Normal language and spontaneous speech  Cranial Nerves:  II-visual fields full  Fundi poorly visualized due to pupillary constriction  III, IV, VI-Pupils were equal, round, and reactive to light and accomodation  Extraocular movements were full and conjugate without nystagmus  V-facial sensation symmetric  VII-facial expression symmetric, intact forehead wrinkle, strong eye closure, symmetric smile    VIII-hearing grossly intact bilaterally   IX, X-palate elevation symmetric, no dysarthria  XI-shoulder shrug strength intact    XII-tongue protrusion midline  Motor Exam: symmetric bulk and tone throughout, no pronator drift  Power/strength 5/5 bilateral upper and lower extremities, no atrophy, fasciculations or abnormal movements noted  Sensory: grossly intact light touch in all extremities  Reflexes: brachioradialis 2+, biceps 2+, knee 2+, ankle 2+ bilaterally  No ankle clonus  Coordination: Finger nose finger intact bilaterally, no apparent dysmetria, ataxia or tremor noted  Gait: steady casual and tandem gait  Pertinent lab results:   - 6/6/22  CMP and CBC unremarkable except for WBC 10 29  - 4/30/20 HIV, RPR NR  TSH normal     Imaging: I have personally reviewed imaging and radiology read   Mri Brain wo contrast 5/4/22: A few small scattered white matter hyperintensities are seen within the frontal white matter bilaterally  These are nonspecific for a patient of this age       Review of Systems:   ROS obtained by medical assistant Personally reviewed and updated if indicated  I recommended PCP follow up for non neurologic problems  Review of Systems   Constitutional: Negative for appetite change and fever  HENT: Negative  Negative for hearing loss, tinnitus, trouble swallowing and voice change  Eyes: Negative  Negative for photophobia and pain  Respiratory: Negative  Negative for shortness of breath  Cardiovascular: Negative  Negative for palpitations  Gastrointestinal: Negative  Negative for nausea and vomiting  Endocrine: Negative  Negative for cold intolerance  Genitourinary: Negative  Negative for dysuria, frequency and urgency  Musculoskeletal: Negative  Negative for myalgias and neck pain  Skin: Negative  Negative for rash  Neurological: Negative    Negative for dizziness, tremors, seizures, syncope, facial asymmetry, speech difficulty, weakness, light-headedness, numbness and headaches  Hematological: Negative  Does not bruise/bleed easily  Psychiatric/Behavioral: Negative  Negative for confusion, hallucinations and sleep disturbance  All other systems reviewed and are negative  I have spent 49 minutes with Patient today in which greater than 50% of this time was spent in counseling/coordination of care regarding Diagnostic results, Prognosis, Risks and benefits of tx options, Intructions for management, Patient education, Importance of tx compliance, Risk factor reductions and Impressions  I also spent 13 minutes non face to face for this patient the same day           Author:  Jenny Valdes MD 8/15/2022 12:07 PM

## 2022-09-20 ENCOUNTER — OFFICE VISIT (OUTPATIENT)
Dept: URGENT CARE | Age: 22
End: 2022-09-20
Payer: MEDICARE

## 2022-09-20 VITALS — TEMPERATURE: 97.9 F | RESPIRATION RATE: 18 BRPM | OXYGEN SATURATION: 98 % | HEART RATE: 72 BPM

## 2022-09-20 DIAGNOSIS — J30.2 SEASONAL ALLERGIC RHINITIS, UNSPECIFIED TRIGGER: Primary | ICD-10-CM

## 2022-09-20 PROCEDURE — 99213 OFFICE O/P EST LOW 20 MIN: CPT

## 2022-09-20 RX ORDER — PSEUDOEPHEDRINE HYDROCHLORIDE 60 MG/1
60 TABLET, FILM COATED ORAL EVERY 8 HOURS PRN
Qty: 21 TABLET | Refills: 0 | Status: SHIPPED | OUTPATIENT
Start: 2022-09-20 | End: 2022-09-27

## 2022-09-20 RX ORDER — FLUTICASONE PROPIONATE 50 MCG
1 SPRAY, SUSPENSION (ML) NASAL DAILY
Qty: 11.1 ML | Refills: 0 | Status: SHIPPED | OUTPATIENT
Start: 2022-09-20

## 2022-09-20 RX ORDER — LORATADINE 10 MG/1
10 TABLET ORAL DAILY
Qty: 30 TABLET | Refills: 0 | Status: SHIPPED | OUTPATIENT
Start: 2022-09-20 | End: 2022-10-20

## 2022-09-21 NOTE — PROGRESS NOTES
[facility]        NAME: Enriqueta Webb is a 25 y o  female  : 2000    MRN: [id number]  DATE: 2022  TIME: 8:22 AM    Assessment and Plan   Seasonal allergic rhinitis, unspecified trigger [J30 2]  1  Seasonal allergic rhinitis, unspecified trigger  fluticasone (FLONASE) 50 mcg/act nasal spray    pseudoephedrine (SUDAFED) 60 mg tablet    loratadine (CLARITIN) 10 mg tablet     Patient with PMH of seasonal allergies presents with c/o congestion, PND and sore throat  Denies fevers  Assessment notes erythematous throat, no swelling, no tonsillar enlargement  No adenopathy  COVID negative  Will refill allergy medication and order Sudafed  Patient Instructions       Follow up with PCP a needed  Chief Complaint     Chief Complaint   Patient presents with    Sore Throat     Sore throat for for past 5 days  History of Present Illness       Patient with PMH of seasonal allergies presents with c/o congestion, PND and sore throat  Denies fevers  Assessment notes erythematous throat, no swelling, no tonsillar enlargement  No adenopathy  COVID negative  Will refill allergy medication and order Sudafed  Sore Throat   Associated symptoms include congestion  Pertinent negatives include no abdominal pain, coughing, diarrhea, ear pain, headaches, shortness of breath or vomiting  Review of Systems   Review of Systems   Constitutional: Negative for chills and fever  HENT: Positive for congestion, postnasal drip and sore throat  Negative for ear pain and sinus pain  Eyes: Negative for pain and itching  Respiratory: Negative for cough, shortness of breath and wheezing  Cardiovascular: Negative for chest pain and palpitations  Gastrointestinal: Negative for abdominal pain, constipation, diarrhea, nausea and vomiting  Genitourinary: Negative for difficulty urinating and hematuria  Musculoskeletal: Negative for arthralgias and myalgias  Skin: Negative for rash  Allergic/Immunologic: Positive for environmental allergies  Neurological: Negative for dizziness, light-headedness and headaches  Psychiatric/Behavioral: Negative for agitation and sleep disturbance  The patient is not nervous/anxious  Current Medications       Current Outpatient Medications:     fluticasone (FLONASE) 50 mcg/act nasal spray, 1 spray into each nostril daily, Disp: 11 1 mL, Rfl: 0    loratadine (CLARITIN) 10 mg tablet, Take 1 tablet (10 mg total) by mouth daily, Disp: 30 tablet, Rfl: 0    norgestimate-ethinyl estradiol (ORTHO-CYCLEN) 0 25-35 MG-MCG per tablet, Take 1 tablet by mouth in the morning , Disp: 28 tablet, Rfl: 11    pseudoephedrine (SUDAFED) 60 mg tablet, Take 1 tablet (60 mg total) by mouth every 8 (eight) hours as needed for congestion for up to 7 days, Disp: 21 tablet, Rfl: 0    mirtazapine (REMERON) 7 5 MG tablet, TAKE 1 TABLET (7 5 MG TOTAL) BY MOUTH DAILY AT BEDTIME (Patient not taking: Reported on 9/20/2022), Disp: 30 tablet, Rfl: 5    naproxen (Naprosyn) 500 mg tablet, Take 1 tablet (500 mg total) by mouth 2 (two) times a day with meals (Patient not taking: Reported on 8/15/2022), Disp: 30 tablet, Rfl: 0    rizatriptan (MAXALT) 10 mg tablet, Take 1 tablet (10 mg total) by mouth once as needed for migraine May repeat in 2 hours if needed  Max 2/24 hours, 9/month   (Patient not taking: Reported on 9/20/2022), Disp: 9 tablet, Rfl: 6    Current Allergies     Allergies as of 09/20/2022    (No Known Allergies)            The following portions of the patient's history were reviewed and updated as appropriate: allergies, current medications, past family history, past medical history, past social history, past surgical history and problem list      Past Medical History:   Diagnosis Date    ADHD (attention deficit hyperactivity disorder)     last seen 1 year ago, no recent RX, was on adderal, off at least 1 year     Migraine     OCD (obsessive compulsive disorder) no treatment       No past surgical history on file  Family History   Problem Relation Age of Onset   Antelmo Henriquez Migraines Mother     ADD / ADHD Father     Heart disease Maternal Grandmother     Diabetes Maternal Grandmother          Medications have been verified  Objective   Pulse 72   Temp 97 9 °F (36 6 °C)   Resp 18   SpO2 98%   No LMP recorded  Physical Exam     Physical Exam  Vitals reviewed  Constitutional:       General: She is not in acute distress  Appearance: Normal appearance  She is not ill-appearing  HENT:      Head: Normocephalic and atraumatic  Right Ear: Tympanic membrane normal       Left Ear: Tympanic membrane normal       Nose: Congestion present  Mouth/Throat: Tonsils: No tonsillar exudate  Eyes:      Extraocular Movements: Extraocular movements intact  Conjunctiva/sclera: Conjunctivae normal    Pulmonary:      Effort: Pulmonary effort is normal    Lymphadenopathy:      Cervical: No cervical adenopathy  Skin:     General: Skin is warm  Neurological:      General: No focal deficit present  Mental Status: She is alert     Psychiatric:         Mood and Affect: Mood normal          Behavior: Behavior normal          Judgment: Judgment normal

## 2022-10-05 ENCOUNTER — OFFICE VISIT (OUTPATIENT)
Dept: URGENT CARE | Age: 22
End: 2022-10-05
Payer: MEDICARE

## 2022-10-05 VITALS
HEART RATE: 86 BPM | DIASTOLIC BLOOD PRESSURE: 70 MMHG | TEMPERATURE: 96.9 F | BODY MASS INDEX: 22.97 KG/M2 | WEIGHT: 124.8 LBS | SYSTOLIC BLOOD PRESSURE: 112 MMHG | RESPIRATION RATE: 20 BRPM | HEIGHT: 62 IN | OXYGEN SATURATION: 99 %

## 2022-10-05 DIAGNOSIS — R30.0 DYSURIA: ICD-10-CM

## 2022-10-05 DIAGNOSIS — N30.01 ACUTE CYSTITIS WITH HEMATURIA: Primary | ICD-10-CM

## 2022-10-05 LAB
SL AMB  POCT GLUCOSE, UA: NEGATIVE
SL AMB LEUKOCYTE ESTERASE,UA: ABNORMAL
SL AMB POCT BILIRUBIN,UA: NEGATIVE
SL AMB POCT BLOOD,UA: ABNORMAL
SL AMB POCT CLARITY,UA: ABNORMAL
SL AMB POCT COLOR,UA: YELLOW
SL AMB POCT KETONES,UA: NEGATIVE
SL AMB POCT NITRITE,UA: NEGATIVE
SL AMB POCT PH,UA: 5
SL AMB POCT SPECIFIC GRAVITY,UA: 1.01
SL AMB POCT URINE HCG: NEGATIVE
SL AMB POCT URINE PROTEIN: 30
SL AMB POCT UROBILINOGEN: 0.2

## 2022-10-05 PROCEDURE — 87077 CULTURE AEROBIC IDENTIFY: CPT

## 2022-10-05 PROCEDURE — 81002 URINALYSIS NONAUTO W/O SCOPE: CPT

## 2022-10-05 PROCEDURE — 99213 OFFICE O/P EST LOW 20 MIN: CPT

## 2022-10-05 PROCEDURE — 87186 SC STD MICRODIL/AGAR DIL: CPT

## 2022-10-05 PROCEDURE — 81025 URINE PREGNANCY TEST: CPT

## 2022-10-05 PROCEDURE — 87086 URINE CULTURE/COLONY COUNT: CPT

## 2022-10-05 RX ORDER — NITROFURANTOIN 25; 75 MG/1; MG/1
100 CAPSULE ORAL 2 TIMES DAILY
Qty: 10 CAPSULE | Refills: 0 | Status: SHIPPED | OUTPATIENT
Start: 2022-10-05 | End: 2022-10-10

## 2022-10-05 NOTE — PROGRESS NOTES
[email or web address]        NAME: Ivet Mathew is a 25 y o  female  : 2000    MRN: [id number]  DATE: 2022  TIME: 6:26 PM    Assessment and Plan   Acute cystitis with hematuria [N30 01]  1  Acute cystitis with hematuria  nitrofurantoin (MACROBID) 100 mg capsule    POCT urine HCG   2  Dysuria  POCT urine dip    Urine culture         Patient Instructions     HCG negative  UA with mod leuks, lg blood  Start Vanessa José Miguel as directed  F/U on UXC  Follow up with PCP in 2-3 days  Proceed to ER if symptoms worsen  Chief Complaint     Chief Complaint   Patient presents with    Possible UTI     Pt started yesterday with dysuria, urinary frequency/urgency, back pain and hematuria  No OTC meds taken  History of Present Illness     25 y o  F presents with complaints of urinary frequency, urgency, back pain, hematuria and dysuria x 1 day  Recent menses ended Saturday  +BC  +sexually active  Declines screening for STD today  +chlaymdial infection in past with tx  Denies hx of stones  Denies fevers or chills  Review of Systems   Review of Systems   Constitutional: Negative for chills, fatigue and fever  HENT: Negative for congestion, ear pain, facial swelling, hearing loss, rhinorrhea, sinus pressure, sneezing, sore throat and trouble swallowing  Eyes: Negative for pain, redness and visual disturbance  Respiratory: Negative for cough, chest tightness, shortness of breath and wheezing  Cardiovascular: Negative for chest pain and palpitations  Gastrointestinal: Negative for abdominal pain, diarrhea, nausea and vomiting  Genitourinary: Positive for dysuria, frequency, hematuria and urgency  Negative for flank pain and pelvic pain  Musculoskeletal: Positive for back pain  Negative for arthralgias and myalgias  Skin: Negative for color change and rash  Neurological: Negative for dizziness, seizures, syncope, weakness, light-headedness and headaches     Psychiatric/Behavioral: Negative for confusion, hallucinations and sleep disturbance  The patient is not nervous/anxious  All other systems reviewed and are negative  Current Medications       Current Outpatient Medications:     nitrofurantoin (MACROBID) 100 mg capsule, Take 1 capsule (100 mg total) by mouth 2 (two) times a day for 5 days, Disp: 10 capsule, Rfl: 0    norgestimate-ethinyl estradiol (ORTHO-CYCLEN) 0 25-35 MG-MCG per tablet, Take 1 tablet by mouth in the morning , Disp: 28 tablet, Rfl: 11    rizatriptan (MAXALT) 10 mg tablet, Take 1 tablet (10 mg total) by mouth once as needed for migraine May repeat in 2 hours if needed   Max 2/24 hours, 9/month , Disp: 9 tablet, Rfl: 6    fluticasone (FLONASE) 50 mcg/act nasal spray, 1 spray into each nostril daily (Patient not taking: Reported on 10/5/2022), Disp: 11 1 mL, Rfl: 0    loratadine (CLARITIN) 10 mg tablet, Take 1 tablet (10 mg total) by mouth daily (Patient not taking: Reported on 10/5/2022), Disp: 30 tablet, Rfl: 0    mirtazapine (REMERON) 7 5 MG tablet, TAKE 1 TABLET (7 5 MG TOTAL) BY MOUTH DAILY AT BEDTIME (Patient not taking: No sig reported), Disp: 30 tablet, Rfl: 5    naproxen (Naprosyn) 500 mg tablet, Take 1 tablet (500 mg total) by mouth 2 (two) times a day with meals (Patient not taking: No sig reported), Disp: 30 tablet, Rfl: 0    pseudoephedrine (SUDAFED) 60 mg tablet, Take 1 tablet (60 mg total) by mouth every 8 (eight) hours as needed for congestion for up to 7 days, Disp: 21 tablet, Rfl: 0    Current Allergies     Allergies as of 10/05/2022    (No Known Allergies)            The following portions of the patient's history were reviewed and updated as appropriate: allergies, current medications, past family history, past medical history, past social history, past surgical history and problem list      Past Medical History:   Diagnosis Date    ADHD (attention deficit hyperactivity disorder)     last seen 1 year ago, no recent RX, was on adderal, off at least 1 year     Migraine     OCD (obsessive compulsive disorder)     no treatment       History reviewed  No pertinent surgical history  Family History   Problem Relation Age of Onset   Kajal Nine Migraines Mother     ADD / ADHD Father     Heart disease Maternal Grandmother     Diabetes Maternal Grandmother          Medications have been verified  Objective   /70   Pulse 86   Temp (!) 96 9 °F (36 1 °C)   Resp 20   Ht 5' 2" (1 575 m)   Wt 56 6 kg (124 lb 12 8 oz)   LMP 09/27/2022   SpO2 99%   Breastfeeding Yes   BMI 22 83 kg/m²   Patient's last menstrual period was 09/27/2022  Physical Exam     Physical Exam  Vitals reviewed  Constitutional:       General: She is not in acute distress  Appearance: Normal appearance  She is not toxic-appearing  HENT:      Head: Normocephalic  Right Ear: Tympanic membrane normal  Tympanic membrane is not erythematous or bulging  Left Ear: Tympanic membrane normal  Tympanic membrane is not erythematous or bulging  Nose: No congestion or rhinorrhea  Right Sinus: No maxillary sinus tenderness or frontal sinus tenderness  Left Sinus: No maxillary sinus tenderness or frontal sinus tenderness  Mouth/Throat:      Pharynx: Uvula midline  No oropharyngeal exudate, posterior oropharyngeal erythema or uvula swelling  Tonsils: No tonsillar exudate or tonsillar abscesses  Eyes:      Extraocular Movements: Extraocular movements intact  Conjunctiva/sclera: Conjunctivae normal       Pupils: Pupils are equal, round, and reactive to light  Cardiovascular:      Rate and Rhythm: Normal rate and regular rhythm  Pulses: Normal pulses  Heart sounds: Normal heart sounds  Pulmonary:      Effort: Pulmonary effort is normal  No tachypnea or respiratory distress  Breath sounds: Normal breath sounds and air entry  No decreased breath sounds, wheezing, rhonchi or rales     Abdominal:      General: Bowel sounds are normal  There is no distension  Palpations: Abdomen is soft  Tenderness: There is no abdominal tenderness  There is no right CVA tenderness, left CVA tenderness or guarding  Comments: No CVA tenderness   Musculoskeletal:         General: Normal range of motion  Cervical back: Normal range of motion  Lymphadenopathy:      Cervical: No cervical adenopathy  Skin:     General: Skin is warm and dry  Neurological:      General: No focal deficit present  Mental Status: She is alert  Cranial Nerves: Cranial nerves are intact  Sensory: Sensation is intact  Motor: Motor function is intact  Coordination: Coordination is intact  Gait: Gait is intact  Deep Tendon Reflexes: Reflexes are normal and symmetric

## 2022-10-05 NOTE — PATIENT INSTRUCTIONS
Your urinalysis showed moderate leukocytes and large amount of blood  Start antibiotic as directed for suspected UTI  Follow up on urine culture  Follow up with PCP in 2-3 days  Follow up with OBGYN  Proceed to ER if symptoms worsen

## 2022-10-07 LAB — BACTERIA UR CULT: ABNORMAL

## 2022-11-16 ENCOUNTER — HOSPITAL ENCOUNTER (EMERGENCY)
Facility: HOSPITAL | Age: 22
Discharge: HOME | End: 2022-11-17
Attending: EMERGENCY MEDICINE

## 2022-11-16 ENCOUNTER — OFFICE VISIT (OUTPATIENT)
Dept: URGENT CARE | Age: 22
End: 2022-11-16

## 2022-11-16 ENCOUNTER — APPOINTMENT (EMERGENCY)
Dept: RADIOLOGY | Facility: HOSPITAL | Age: 22
End: 2022-11-16

## 2022-11-16 VITALS
OXYGEN SATURATION: 98 % | DIASTOLIC BLOOD PRESSURE: 74 MMHG | SYSTOLIC BLOOD PRESSURE: 118 MMHG | TEMPERATURE: 100.8 F | RESPIRATION RATE: 18 BRPM | HEART RATE: 112 BPM

## 2022-11-16 VITALS
OXYGEN SATURATION: 100 % | SYSTOLIC BLOOD PRESSURE: 125 MMHG | TEMPERATURE: 100 F | WEIGHT: 122.1 LBS | DIASTOLIC BLOOD PRESSURE: 75 MMHG | RESPIRATION RATE: 18 BRPM | BODY MASS INDEX: 22.33 KG/M2 | HEART RATE: 110 BPM

## 2022-11-16 DIAGNOSIS — R68.89 FLU-LIKE SYMPTOMS: Primary | ICD-10-CM

## 2022-11-16 DIAGNOSIS — J10.1 INFLUENZA A: Primary | ICD-10-CM

## 2022-11-16 LAB
EXT PREGNANCY TEST URINE: NEGATIVE
EXT. CONTROL: NORMAL

## 2022-11-16 RX ORDER — KETOROLAC TROMETHAMINE 30 MG/ML
15 INJECTION, SOLUTION INTRAMUSCULAR; INTRAVENOUS ONCE
Status: COMPLETED | OUTPATIENT
Start: 2022-11-16 | End: 2022-11-16

## 2022-11-16 RX ADMIN — KETOROLAC TROMETHAMINE 15 MG: 30 INJECTION, SOLUTION INTRAMUSCULAR; INTRAVENOUS at 23:32

## 2022-11-16 NOTE — LETTER
November 16, 2022     Patient: Luis Rosas   YOB: 2000   Date of Visit: 11/16/2022       To Whom it May Concern:    Bronson Joyner was seen in my clinic on 11/16/2022  She may return to work on 11/18/2022 as long as fever free for 24 hours       If you have any questions or concerns, please don't hesitate to call           Sincerely,          SIL Humphries        CC: No Recipients

## 2022-11-16 NOTE — PROGRESS NOTES
[facility]        NAME: Sánchez Tarango is a 25 y o  female  : 2000    MRN: [id number]  DATE: 2022  TIME: 10:38 AM    Assessment and Plan   Flu-like symptoms [R68 89]  1  Flu-like symptoms  Covid/Flu-Office Collect        Patient presents with flu like symptoms including cough, congestion, fevers, body aches   Has not taken anything  No know sick exposures  Denies wheezing, SOB    Assessment notes adenopathy, clear breath sounds, febrile in office  Erythematous oropharynx   No tonsillar swelling or exudate  Will send COVID/FLu  Discussed symptom management    Patient Instructions       Follow up with PCP as needed    Chief Complaint     Chief Complaint   Patient presents with   • Sore Throat     Body aches, earache, sore throat, cough, congestion, for past week  Sore throat has gone away  Was sent home from work  Symptoms worsened today         History of Present Illness       Patient presents with flu like symptoms including cough, congestion, fevers, body aches   Has not taken anything  No know sick exposures  Denies wheezing, SOB    Assessment notes adenopathy, clear breath sounds, febrile in office  Erythematous oropharynx   No tonsillar swelling or exudate  Will send COVID/FLu  Discussed symptom management  Sore Throat   Associated symptoms include congestion and coughing  Pertinent negatives include no abdominal pain, diarrhea, ear discharge, ear pain, headaches, shortness of breath or vomiting  Review of Systems   Review of Systems   Constitutional: Positive for chills, fatigue and fever  HENT: Positive for congestion, postnasal drip and sore throat  Negative for ear discharge, ear pain, sinus pressure and sinus pain  Eyes: Negative for pain and discharge  Respiratory: Positive for cough  Negative for shortness of breath and wheezing  Cardiovascular: Negative for chest pain and palpitations     Gastrointestinal: Negative for abdominal pain, diarrhea, nausea and vomiting  Genitourinary: Negative for difficulty urinating and dysuria  Musculoskeletal: Negative for arthralgias and myalgias  Skin: Negative for rash  Neurological: Negative for dizziness, syncope, light-headedness, numbness and headaches  Psychiatric/Behavioral: Negative for agitation  All other systems reviewed and are negative  Current Medications       Current Outpatient Medications:   •  norgestimate-ethinyl estradiol (ORTHO-CYCLEN) 0 25-35 MG-MCG per tablet, Take 1 tablet by mouth in the morning , Disp: 28 tablet, Rfl: 11  •  fluticasone (FLONASE) 50 mcg/act nasal spray, 1 spray into each nostril daily (Patient not taking: Reported on 10/5/2022), Disp: 11 1 mL, Rfl: 0  •  loratadine (CLARITIN) 10 mg tablet, Take 1 tablet (10 mg total) by mouth daily (Patient not taking: Reported on 10/5/2022), Disp: 30 tablet, Rfl: 0  •  mirtazapine (REMERON) 7 5 MG tablet, TAKE 1 TABLET (7 5 MG TOTAL) BY MOUTH DAILY AT BEDTIME (Patient not taking: Reported on 9/20/2022), Disp: 30 tablet, Rfl: 5  •  naproxen (Naprosyn) 500 mg tablet, Take 1 tablet (500 mg total) by mouth 2 (two) times a day with meals (Patient not taking: Reported on 8/15/2022), Disp: 30 tablet, Rfl: 0  •  pseudoephedrine (SUDAFED) 60 mg tablet, Take 1 tablet (60 mg total) by mouth every 8 (eight) hours as needed for congestion for up to 7 days, Disp: 21 tablet, Rfl: 0  •  rizatriptan (MAXALT) 10 mg tablet, Take 1 tablet (10 mg total) by mouth once as needed for migraine May repeat in 2 hours if needed  Max 2/24 hours, 9/month   (Patient not taking: Reported on 11/16/2022), Disp: 9 tablet, Rfl: 6    Current Allergies     Allergies as of 11/16/2022   • (No Known Allergies)            The following portions of the patient's history were reviewed and updated as appropriate: allergies, current medications, past family history, past medical history, past social history, past surgical history and problem list      Past Medical History: Diagnosis Date   • ADHD (attention deficit hyperactivity disorder)     last seen 1 year ago, no recent RX, was on adderal, off at least 1 year    • Migraine    • OCD (obsessive compulsive disorder)     no treatment       No past surgical history on file  Family History   Problem Relation Age of Onset   • Migraines Mother    • ADD / ADHD Father    • Heart disease Maternal Grandmother    • Diabetes Maternal Grandmother          Medications have been verified  Objective   /74   Pulse (!) 112   Temp (!) 100 8 °F (38 2 °C)   Resp 18   SpO2 98%   No LMP recorded  Physical Exam     Physical Exam  Vitals reviewed  Constitutional:       General: She is not in acute distress  Appearance: Normal appearance  She is not ill-appearing  HENT:      Head: Normocephalic and atraumatic  Right Ear: Tympanic membrane and ear canal normal  No middle ear effusion  Left Ear: Tympanic membrane and ear canal normal   No middle ear effusion  Nose: Congestion and rhinorrhea present  Mouth/Throat:      Mouth: Mucous membranes are moist       Pharynx: Posterior oropharyngeal erythema present  No oropharyngeal exudate  Tonsils: No tonsillar exudate  0 on the right  0 on the left  Eyes:      Extraocular Movements: Extraocular movements intact  Conjunctiva/sclera: Conjunctivae normal       Pupils: Pupils are equal, round, and reactive to light  Cardiovascular:      Rate and Rhythm: Normal rate and regular rhythm  Pulses: Normal pulses  Heart sounds: Normal heart sounds  No murmur heard  Pulmonary:      Effort: Pulmonary effort is normal  No respiratory distress  Breath sounds: Normal breath sounds  No wheezing  Abdominal:      General: Bowel sounds are normal  There is no distension  Palpations: Abdomen is soft  Tenderness: There is no abdominal tenderness  There is no guarding     Musculoskeletal:      Cervical back: Normal range of motion and neck supple  No tenderness  Lymphadenopathy:      Cervical: Cervical adenopathy present  Skin:     General: Skin is warm  Neurological:      General: No focal deficit present  Mental Status: She is alert     Psychiatric:         Mood and Affect: Mood normal          Behavior: Behavior normal          Judgment: Judgment normal

## 2022-11-17 LAB
FLUAV RNA RESP QL NAA+PROBE: NEGATIVE
FLUAV RNA RESP QL NAA+PROBE: POSITIVE
FLUBV RNA RESP QL NAA+PROBE: NEGATIVE
FLUBV RNA RESP QL NAA+PROBE: NEGATIVE
RSV RNA RESP QL NAA+PROBE: NEGATIVE
SARS-COV-2 RNA RESP QL NAA+PROBE: NEGATIVE
SARS-COV-2 RNA RESP QL NAA+PROBE: NEGATIVE

## 2022-11-17 RX ORDER — OSELTAMIVIR PHOSPHATE 75 MG/1
75 CAPSULE ORAL 2 TIMES DAILY
Qty: 10 CAPSULE | Refills: 0 | Status: SHIPPED | OUTPATIENT
Start: 2022-11-17 | End: 2022-11-22

## 2022-11-17 RX ORDER — BENZONATATE 100 MG/1
100 CAPSULE ORAL EVERY 8 HOURS
Qty: 21 CAPSULE | Refills: 0 | Status: SHIPPED | OUTPATIENT
Start: 2022-11-17

## 2022-11-17 NOTE — ED PROVIDER NOTES
History  Chief Complaint   Patient presents with   • Generalized Body Aches     Reports symptoms started this morning  States she has taken dayquil with no relief  Took nyquil shortly before coming in     • Headache     Patient presents for an evaluation of generalized body aches, cough ongoing since this morning  Seen at urgent care this morning and swabbed for COVID/ flu  States symptoms have not resolved since  She has been using DayQuil and NyQuil  Denies any chest pain, shortness of breath, nausea, vomiting, abdominal pain, dizziness  Denies any medical history  No other complaints  Prior to Admission Medications   Prescriptions Last Dose Informant Patient Reported? Taking?   fluticasone (FLONASE) 50 mcg/act nasal spray   No No   Si spray into each nostril daily   Patient not taking: Reported on 10/5/2022   loratadine (CLARITIN) 10 mg tablet   No No   Sig: Take 1 tablet (10 mg total) by mouth daily   Patient not taking: Reported on 10/5/2022   mirtazapine (REMERON) 7 5 MG tablet   No No   Sig: TAKE 1 TABLET (7 5 MG TOTAL) BY MOUTH DAILY AT BEDTIME   Patient not taking: Reported on 2022   naproxen (Naprosyn) 500 mg tablet   No No   Sig: Take 1 tablet (500 mg total) by mouth 2 (two) times a day with meals   Patient not taking: Reported on 8/15/2022   norgestimate-ethinyl estradiol (ORTHO-CYCLEN) 0 25-35 MG-MCG per tablet   No No   Sig: Take 1 tablet by mouth in the morning  pseudoephedrine (SUDAFED) 60 mg tablet   No No   Sig: Take 1 tablet (60 mg total) by mouth every 8 (eight) hours as needed for congestion for up to 7 days   rizatriptan (MAXALT) 10 mg tablet   No No   Sig: Take 1 tablet (10 mg total) by mouth once as needed for migraine May repeat in 2 hours if needed  Max 2/24 hours, /month     Patient not taking: Reported on 2022      Facility-Administered Medications: None       Past Medical History:   Diagnosis Date   • ADHD (attention deficit hyperactivity disorder)     last seen 1 year ago, no recent RX, was on adderal, off at least 1 year    • Migraine    • OCD (obsessive compulsive disorder)     no treatment       History reviewed  No pertinent surgical history  Family History   Problem Relation Age of Onset   • Migraines Mother    • ADD / ADHD Father    • Heart disease Maternal Grandmother    • Diabetes Maternal Grandmother      I have reviewed and agree with the history as documented  E-Cigarette/Vaping   • E-Cigarette Use Never User      E-Cigarette/Vaping Substances   • Nicotine No    • THC No    • CBD No    • Flavoring No    • Other No    • Unknown No      Social History     Tobacco Use   • Smoking status: Never   • Smokeless tobacco: Never   Vaping Use   • Vaping Use: Never used   Substance Use Topics   • Alcohol use: No   • Drug use: Not Currently     Types: Marijuana       Review of Systems   Constitutional: Positive for chills  Negative for fever  HENT: Negative for congestion, ear pain and sore throat  Eyes: Negative for pain  Respiratory: Positive for cough  Negative for shortness of breath  Cardiovascular: Negative for chest pain  Gastrointestinal: Negative for abdominal pain, nausea and vomiting  Genitourinary: Negative for dysuria  Musculoskeletal: Negative for back pain  Skin: Negative for rash  Neurological: Negative for dizziness and numbness  Psychiatric/Behavioral: Negative for suicidal ideas  All other systems reviewed and are negative  Physical Exam  Physical Exam  Vitals reviewed  Constitutional:       General: She is not in acute distress  Appearance: She is well-developed and well-nourished  She is ill-appearing  She is not toxic-appearing  HENT:      Head: Normocephalic and atraumatic  Right Ear: External ear normal       Left Ear: External ear normal       Nose: Nose normal       Mouth/Throat:      Mouth: Oropharynx is clear and moist  Mucous membranes are moist       Pharynx: Oropharynx is clear     Eyes: Extraocular Movements: EOM normal    Cardiovascular:      Rate and Rhythm: Regular rhythm  Tachycardia present  Heart sounds: Normal heart sounds  Pulmonary:      Effort: Pulmonary effort is normal       Breath sounds: Normal breath sounds  Abdominal:      General: Bowel sounds are normal       Palpations: Abdomen is soft  Tenderness: There is no abdominal tenderness  Musculoskeletal:         General: Normal range of motion  Cervical back: Normal range of motion and neck supple  Skin:     General: Skin is warm and dry  Capillary Refill: Capillary refill takes less than 2 seconds  Neurological:      Mental Status: She is alert and oriented to person, place, and time     Psychiatric:         Mood and Affect: Mood and affect normal          Behavior: Behavior normal          Vital Signs  ED Triage Vitals   Temperature Pulse Respirations Blood Pressure SpO2   11/16/22 2232 11/16/22 2232 11/16/22 2232 11/16/22 2232 11/16/22 2232   100 °F (37 8 °C) (!) 110 18 125/75 100 %      Temp Source Heart Rate Source Patient Position - Orthostatic VS BP Location FiO2 (%)   11/16/22 2232 11/16/22 2232 11/16/22 2232 11/16/22 2232 --   Tympanic Monitor Sitting Left arm       Pain Score       11/16/22 2332       10 - Worst Possible Pain           Vitals:    11/16/22 2232   BP: 125/75   Pulse: (!) 110   Patient Position - Orthostatic VS: Sitting         Visual Acuity      ED Medications  Medications   ketorolac (TORADOL) injection 15 mg (15 mg Intramuscular Given 11/16/22 2332)       Diagnostic Studies  Results Reviewed     Procedure Component Value Units Date/Time    COVID19, Influenza A/B, RSV PCR, [facility] [[id number]]  (Abnormal) Collected: 11/16/22 2330    Lab Status: Final result Specimen: Nares from Nose Updated: 11/17/22 0030     SARS-CoV-2 Negative     INFLUENZA A PCR Positive     INFLUENZA B PCR Negative     RSV PCR Negative    Narrative:      FOR PEDIATRIC PATIENTS - copy/paste COVID Guidelines URL to browser: [email or web address]    SARS-CoV-2 assay is a Nucleic Acid Amplification assay intended for the  qualitative detection of nucleic acid from SARS-CoV-2 in nasopharyngeal  swabs  Results are for the presumptive identification of SARS-CoV-2 RNA  Positive results are indicative of infection with SARS-CoV-2, the virus  causing COVID-19, but do not rule out bacterial infection or co-infection  with other viruses  Laboratories within the [place] and its  territories are required to report all positive results to the appropriate  public health authorities  Negative results do not preclude SARS-CoV-2  infection and should not be used as the sole basis for treatment or other  patient management decisions  Negative results must be combined with  clinical observations, patient history, and epidemiological information  This test has not been FDA cleared or approved  This test has been authorized by FDA under an Emergency Use Authorization  (EUA)  This test is only authorized for the duration of time the  declaration that circumstances exist justifying the authorization of the  emergency use of an in vitro diagnostic tests for detection of SARS-CoV-2  virus and/or diagnosis of COVID-19 infection under section 564(b)(1) of  the Act, 21 U  S C  893UYU-2(Y)(4), unless the authorization is terminated  or revoked sooner  The test has been validated but independent review by FDA  and CLIA is pending  Test performed using [employer] Gene[employer]pert: This RT-PCR assay targets N2,  a region unique to SARS-CoV-2  A conserved region in the E-gene was chosen  for pan-Sarbecovirus detection which includes SARS-CoV-2  According to CMS-2020-01-R, this platform meets the definition of high-throughput technology      POCT pregnancy, urine [[id number]]  (Normal) Resulted: 11/16/22 2318    Lab Status: Final result Updated: 11/16/22 2318     EXT Preg Test, Ur Negative     Control Valid                 XR chest portable    (Results Pending)              Procedures  Procedures         ED Course                                             MDM  Number of Diagnoses or Management Options  Influenza A  Diagnosis management comments: Influenza A positive  Will treat with Tamiflu  Instructed to follow up with PCP  Patient agreeable      Disposition  Final diagnoses:   Influenza A     Time reflects when diagnosis was documented in both MDM as applicable and the Disposition within this note     Time User Action Codes Description Comment    11/17/2022 12:35 AM Patti Shelley Add [J10 1] Influenza A       ED Disposition     ED Disposition   Discharge    Condition   Stable    Date/Time   Thu Nov 17, 2022 12:34 AM    Comment   [facility] discharge to home/self care                 Follow-up Information     Follow up With Specialties Details Why Contact Info    Gabe Ayala, [address], Nurse Practitioner   [facility] [id number]  [phone]            Discharge Medication List as of 11/17/2022 12:37 AM      START taking these medications    Details   benzonatate (TESSALON PERLES) 100 mg capsule Take 1 capsule (100 mg total) by mouth every 8 (eight) hours, Starting Thu 11/17/2022, Normal      oseltamivir (TAMIFLU) 75 mg capsule Take 1 capsule (75 mg total) by mouth 2 (two) times a day for 5 days, Starting Thu 11/17/2022, Until Tue 11/22/2022, Normal         CONTINUE these medications which have NOT CHANGED    Details   fluticasone (FLONASE) 50 mcg/act nasal spray 1 spray into each nostril daily, Starting Tue 9/20/2022, Normal      loratadine (CLARITIN) 10 mg tablet Take 1 tablet (10 mg total) by mouth daily, Starting Tue 9/20/2022, Until Thu 10/20/2022, Normal      mirtazapine (REMERON) 7 5 MG tablet TAKE 1 TABLET (7 5 MG TOTAL) BY MOUTH DAILY AT BEDTIME, Starting Wed 3/16/2022, Normal      naproxen (Naprosyn) 500 mg tablet Take 1 tablet (500 mg total) by mouth 2 (two) times a day with meals, Starting Mon 2/21/2022, Normal      norgestimate-ethinyl estradiol (ORTHO-CYCLEN) 0 25-35 MG-MCG per tablet Take 1 tablet by mouth in the morning , Starting Tue 5/24/2022, Normal      pseudoephedrine (SUDAFED) 60 mg tablet Take 1 tablet (60 mg total) by mouth every 8 (eight) hours as needed for congestion for up to 7 days, Starting Tue 9/20/2022, Until Tue 9/27/2022 at 2359, Normal      rizatriptan (MAXALT) 10 mg tablet Take 1 tablet (10 mg total) by mouth once as needed for migraine May repeat in 2 hours if needed  Max 2/24 hours, 9/month , Starting Mon 8/15/2022, Normal             No discharge procedures on file      PDMP Review     None          ED Provider  Electronically Signed by           Elicia Hamm PA-C  11/17/22 0104

## 2022-11-17 NOTE — DISCHARGE INSTRUCTIONS
You have influenza A  Please continue using Tylenol and ibuprofen at home for fevers  Use medication as prescribed  Please return to the ER with any worsening symptoms

## 2022-12-08 ENCOUNTER — TELEPHONE (OUTPATIENT)
Dept: NEUROLOGY | Facility: CLINIC | Age: 22
End: 2022-12-08

## 2022-12-08 NOTE — TELEPHONE ENCOUNTER
Called and spoke to patient to confirm their upcoming appointment with Dr Landen Land  Informed patient about arriving in the [place] location 15 minutes prior to their appointment to get checked in and going over chart

## 2022-12-15 NOTE — TELEPHONE ENCOUNTER
patient called to reschedule due to the weather  She asked for an appt after 3, I offered her 2-13-23 at 3 pm and she accepted it

## 2023-02-07 ENCOUNTER — TELEPHONE (OUTPATIENT)
Dept: NEUROLOGY | Facility: CLINIC | Age: 23
End: 2023-02-07

## 2023-02-07 NOTE — TELEPHONE ENCOUNTER
Called patient and left voicemail to confirm their upcoming appointment with Dr Baldev Jordan  Informed patient about arriving in the [place] location 15 minutes prior to appointment to get checked in and go over chart

## 2023-02-13 ENCOUNTER — OFFICE VISIT (OUTPATIENT)
Dept: NEUROLOGY | Facility: CLINIC | Age: 23
End: 2023-02-13

## 2023-02-13 VITALS
SYSTOLIC BLOOD PRESSURE: 119 MMHG | DIASTOLIC BLOOD PRESSURE: 72 MMHG | HEART RATE: 65 BPM | BODY MASS INDEX: 22.45 KG/M2 | WEIGHT: 122 LBS | HEIGHT: 62 IN

## 2023-02-13 DIAGNOSIS — G43.009 MIGRAINE WITHOUT AURA AND WITHOUT STATUS MIGRAINOSUS, NOT INTRACTABLE: Primary | ICD-10-CM

## 2023-02-13 RX ORDER — RIZATRIPTAN BENZOATE 10 MG/1
10 TABLET ORAL ONCE AS NEEDED
Qty: 9 TABLET | Refills: 12 | Status: SHIPPED | OUTPATIENT
Start: 2023-02-13

## 2023-02-13 NOTE — PATIENT INSTRUCTIONS
Headache/migraine treatment:   Rescue medications (for immediate treatment of a headache): It is ok to take ibuprofen, acetaminophen or naproxen (Advil, Tylenol,  Aleve, Excedrin) if they help your headaches you should limit these to No more than 3 times a week to avoid medication overuse/rebound headaches  For your more moderate to severe migraines take this medication early   Maxalt (rizatriptan) 10mg tabs - take one at the onset of headache  May repeat one time after 2 hours if pain has not resolved  (Max 2 a day and 9 a month)     Over the counter preventive supplements for headaches/migraines (if you try, try for 3 months straight)  (to take every day to help prevent headaches - not to take at the time of headache): There are combo pills online of these - none of which regulated by FDA and double check dosing - take appropriate dose only once a day- preventa migraine, migravent, mind ease, migrelief   [] Magnesium 400mg daily (If any diarrhea or upset stomach, decrease dose  as tolerated)  [] Riboflavin (Vitamin B2) 400mg daily - try online   (FYI B2 may make your urine bright/neon yellow)  AND/OR  [] Herbal medication: Petasites/Butterbur 150 mg daily - try online  (When choosing your Butterbur online or in the store, beware that there are some poor preps containing pyrrolizidine alkaloids (PAs) that can be harmful to the liver  Therefore, do not use butterbur products that are not labeled as PA-free )    Prescription preventive medications for headaches/migraines   (to take every day to help prevent headaches - not to take at the time of headache):  [x] we have options if needed      *Typically these types of medications take time until you see the benefit, although some may see improvement in days, often it may take weeks, especially if the medication is being titrated up to a beneficial level  Please contact us if there are any concerns or questions regarding the medication       Lifestyle Recommendations:  [x] SLEEP - Maintain a regular sleep schedule: Adults need at least 7-8 hours of uninterrupted a night  Maintain good sleep hygiene:  Going to bed and waking up at consistent times, avoiding excessive daytime naps, avoiding caffeinated beverages in the evening, avoid excessive stimulation in the evening and generally using bed primarily for sleeping  One hour before bedtime would recommend turning lights down lower, decreasing your activity (may read quietly, listen to music at a low volume)  When you get into bed, should eliminate all technology (no texting, emailing, playing with your phone, iPad or tablet in bed)  [x] HYDRATION - Maintain good hydration  Drink  2L of fluid a day (4 typical small water bottles)  [x] DIET - Maintain good nutrition  In particular don't skip meals and try and eat healthy balanced meals regularly  [x] TRIGGERS - Look for other triggers and avoid them: Limit caffeine to 1-2 cups a day or less  Avoid dietary triggers that you have noticed bring on your headaches (this could include aged cheese, peanuts, MSG, aspartame and nitrates)  [x] EXERCISE - physical exercise as we all know is good for you in many ways, and not only is good for your heart, but also is beneficial for your mental health, cognitive health and  chronic pain/headaches  I would encourage at the least 5 days of physical exercise weekly for at least 30 minutes  Education and Follow-up  [x] Please call with any questions or concerns  Of course if any new concerning symptoms go to the emergency department    [x] Follow up 1 year, sooner if needed

## 2023-02-13 NOTE — PROGRESS NOTES
[facility] Consult - Follow up   PATIENT:  Maricel Cabrera  MRN:  [id number]  :  2000  DATE OF SERVICE:  2023  REFERRED BY: No ref  provider found  PMD: SIL Nicolas    Assessment/Plan:   Maricel Cabrera is a delightful  25 y o  female with a past medical history that includes Migraine, anxiety, ADHD, Depression, OCD, intermittent palpitations referred here for evaluation of headache  My initial evaluation 8/15/2022    Migraine without aura and without status migrainosus, not intractable  She reports a long history of headaches and migraines dating back to her teens as well as a family history in mom  She has followed with peds neuro in the past and has had MRI Brain  She reports pain is typical right frontal/temporal/retro-orbital, without aura and with typical associated migrainous features  - as of 8/15/2022: migraines frequency better lately, on average 10 a month  She is currently on mirtazapine for mood/sleep but only takes occasionally and we discussed first would try this consistently to see if treatment of the latter could also decrease migraines and get sleep back on track prior to work starting soon  Otherwise we discussed could consider low dose propranolol if tolerated or CGRP med for prevention  Trial of rizatriptan for abortive  - as of 2023: Reports significant improvement since last visit and she loves rizatriptan which works well for rescue  Migraines 1-2 a month on average  Stress better, sleeping better and taking mirtazapine nightly  Workup:  - Neurologic assessment reveals unremarkable neurological exam   - MRI Brain wo contrast 22: A few small scattered white matter hyperintensities are seen within the frontal white matter bilaterally  These are nonspecific for a patient of this age      Preventative:  - we discussed headache hygiene and lifestyle factors that may improve headaches  - On through other providers: mirtazapine 7 5 mg nightly   - Past/ failed/contraindicated: amitriptyline   - future options: Topiramate, CGRP med, botox    Rescue:  - discussed not taking over-the-counter or prescription pain medications more than 3 days per week to prevent medication overuse/rebound headache  -     rizatriptan (MAXALT) 10 mg tablet; Take 1 tablet (10 mg total) by mouth once as needed for migraine May repeat in 2 hours if needed  Max 2/24 hours, 9/month  Discussed proper use, possible side effects and risks  - Past/ failed/contraindicated: sumatriptan 25 mg worked sometimes only, I do not recommend fioricet  - past/helped: migraine cocktails in ED, steroids, toradol IM   - future options:   prochlorperazine, Toradol IM or p o , could consider trial of 5 days of Depakote 500 mg nightly or dexamethasone 2 mg daily for prolonged migraine, ubrelvy, reyvow, nurtec      Patient instructions          Headache/migraine treatment:   Rescue medications (for immediate treatment of a headache): It is ok to take ibuprofen, acetaminophen or naproxen (Advil, Tylenol,  Aleve, Excedrin) if they help your headaches you should limit these to No more than 3 times a week to avoid medication overuse/rebound headaches  For your more moderate to severe migraines take this medication early   Maxalt (rizatriptan) 10mg tabs - take one at the onset of headache  May repeat one time after 2 hours if pain has not resolved  (Max 2 a day and 9 a month)     Over the counter preventive supplements for headaches/migraines (if you try, try for 3 months straight)  (to take every day to help prevent headaches - not to take at the time of headache):   There are combo pills online of these - none of which regulated by FDA and double check dosing - take appropriate dose only once a day- preventa migraine, migravent, mind ease, migrelief   [] Magnesium 400mg daily (If any diarrhea or upset stomach, decrease dose  as tolerated)  [] Riboflavin (Vitamin B2) 400mg daily - try online   (FYI B2 may make your urine bright/neon yellow)  AND/OR  [] Herbal medication: Petasites/Butterbur 150 mg daily - try online  (When choosing your Butterbur online or in the store, beware that there are some poor preps containing pyrrolizidine alkaloids (PAs) that can be harmful to the liver  Therefore, do not use butterbur products that are not labeled as PA-free )    Prescription preventive medications for headaches/migraines   (to take every day to help prevent headaches - not to take at the time of headache):  [x] we have options if needed      *Typically these types of medications take time until you see the benefit, although some may see improvement in days, often it may take weeks, especially if the medication is being titrated up to a beneficial level  Please contact us if there are any concerns or questions regarding the medication  Lifestyle Recommendations:  [x] SLEEP - Maintain a regular sleep schedule: Adults need at least 7-8 hours of uninterrupted a night  Maintain good sleep hygiene:  Going to bed and waking up at consistent times, avoiding excessive daytime naps, avoiding caffeinated beverages in the evening, avoid excessive stimulation in the evening and generally using bed primarily for sleeping  One hour before bedtime would recommend turning lights down lower, decreasing your activity (may read quietly, listen to music at a low volume)  When you get into bed, should eliminate all technology (no texting, emailing, playing with your phone, iPad or tablet in bed)  [x] HYDRATION - Maintain good hydration  Drink  2L of fluid a day (4 typical small water bottles)  [x] DIET - Maintain good nutrition  In particular don't skip meals and try and eat healthy balanced meals regularly  [x] TRIGGERS - Look for other triggers and avoid them: Limit caffeine to 1-2 cups a day or less   Avoid dietary triggers that you have noticed bring on your headaches (this could include aged cheese, peanuts, MSG, aspartame and nitrates)  [x] EXERCISE - physical exercise as we all know is good for you in many ways, and not only is good for your heart, but also is beneficial for your mental health, cognitive health and  chronic pain/headaches  I would encourage at the least 5 days of physical exercise weekly for at least 30 minutes  Education and Follow-up  [x] Please call with any questions or concerns  Of course if any new concerning symptoms go to the emergency department  [x] Follow up 1 year, sooner if needed       CC: We had the pleasure of evaluating Nydia Heck in neurological consultation today  Nydia Heck is a   right handed female who presents today for evaluation of headaches  History obtained from patient as well as available medical record review  History of Present Illness:   Interval history as of 2/13/2023  - denies any new or concerning neurologic symptoms since last visit     Headaches and migraines   Much better, maybe 1-2 month on average     Preventative:   - On through other providers: mirtazapine 7 5 mg nightly     Abortive:   Trial of rizatriptan 10 mg in place of sumatriptan 25 mg - "I love it"  Denies bothersome side effects      History as of initial visit 8/15/22:   She has followed with peds neurology in the past     Headaches started at what age? 15-12 years old  How often do the headaches occur?   - as of 8/15/2022: waxes and wanes, better lately, on average 10 a month   What time of the day do the headaches start? No particular time of day  How long do the headaches last? Days   Are you ever headache free? Yes    Aura? without aura     Last eye exam: wears glasses, 3/2022 normal except for glasses    Where is your headache located and pain quality?   - more on right temple and then can go to right retro-orbital and stays, can be throbbing/pulstating and sharp at times   - once in blue moon on the left    What is the intensity of pain?  Average: 10/10, worst 10/10  Associated symptoms:   [] Nausea       [] Vomiting     [x] decreased appetite     [x] Photophobia     [x]Phonophobia      [] Osmophobia  [] Blurred vision   [x] Light-headed or dizzy  - sometimes   [] Tinnitus   [] Hands or feet tingle or feel numb/paresthesias    [] Ptosis      [] Facial droop  [] Lacrimation  [] Nasal congestion - always     Things that make the headache worse? No specific movements, any quick head movement     Headache triggers:  Stress, hormonal    Have you seen someone else for headaches or pain? Yes, neurology in the past   Have you had trigger point injection performed and how often? No  Have you had Botox injection performed and how often? No   Have you had epidural injections or transforaminal injections performed? No  Are you current pregnant or planning on getting pregnant? Not for years, on [facility]   Have you ever had any Brain imaging? yes     What medications do you take or have you taken for your headaches?    ABORTIVE:    OTC medications have been ineffective     Sumatriptan 25 mg - no SE     Past   Steroids - no SE  toradol IM   fioricet  Migraine cocktail    PREVENTIVE:     Mirtazapine - been on it about a year, takes when has a headache to help her sleep     Past/ failed/contraindicated:  Amitriptyline 25 mg       LIFESTYLE  Sleep   - averages: delayed during summer/late night owl during summer, avg 6 hours, rec sleep cycle    Water: 2 bottles per day  Caffeine: none per day    Mood: anxiety, depression, OCD, ADHD dx by psychiatry in the past, now with PCP, anxiety is not great     The following portions of the patient's history were reviewed and updated as appropriate: allergies, current medications, past family history, past medical history, past social history, past surgical history and problem list     Pertinent family history:  Family history of headaches:  migraine headaches in mother  Any family history of aneurysms - No    Pertinent social history:  Work: [employer] starts again in Sept 2022  Lives with mom     Past Medical History:     Past Medical History:   Diagnosis Date   • ADHD (attention deficit hyperactivity disorder)     last seen 1 year ago, no recent RX, was on adderal, off at least 1 year    • Migraine    • OCD (obsessive compulsive disorder)     no treatment       Patient Active Problem List   Diagnosis   • Encounter for surveillance of contraceptive pills   • ADHD (attention deficit hyperactivity disorder)   • Depressed mood   • OCD (obsessive compulsive disorder)   • High risk heterosexual behavior   • Other headache syndrome   • Anxiety   • Headache   • Abnormal CBC   • Intermittent palpitations   • Poor weight gain in adult   • Exposure to COVID-19 virus   • Migraine without status migrainosus, not intractable       Medications:      Current Outpatient Medications   Medication Sig Dispense Refill   • mirtazapine (REMERON) 7 5 MG tablet TAKE 1 TABLET (7 5 MG TOTAL) BY MOUTH DAILY AT BEDTIME 30 tablet 5   • norgestimate-ethinyl estradiol (ORTHO-CYCLEN) 0 25-35 MG-MCG per tablet Take 1 tablet by mouth in the morning  28 tablet 11   • rizatriptan (MAXALT) 10 mg tablet Take 1 tablet (10 mg total) by mouth once as needed for migraine May repeat in 2 hours if needed  Max 2/24 hours, 9/month  9 tablet 12     No current facility-administered medications for this visit          Allergies:    No Known Allergies    Family History:     Family History   Problem Relation Age of Onset   • Migraines Mother    • ADD / ADHD Father    • Heart disease Maternal Grandmother    • Diabetes Maternal Grandmother        Social History:     Social History     Socioeconomic History   • Marital status: Single     Spouse name: Not on file   • Number of children: Not on file   • Years of education: Not on file   • Highest education level: Not on file   Occupational History   • Not on file   Tobacco Use   • Smoking status: Never   • Smokeless tobacco: Never   Vaping Use   • Vaping Use: Never used   Substance and Sexual Activity   • Alcohol use: No   • Drug use: Not Currently     Types: Marijuana   • Sexual activity: Yes     Birth control/protection: Pill     Comment: patient's cell [phone]; lives with Mom, may stay at boyfriends house on [address]  Other Topics Concern   • Not on file   Social History Narrative    Lives with mother and sister  Social Determinants of Health     Financial Resource Strain: Not on file   Food Insecurity: Not on file   Transportation Needs: Not on file   Physical Activity: Not on file   Stress: Not on file   Social Connections: Not on file   Intimate Partner Violence: Not on file   Housing Stability: Not on file         Objective:       Physical Exam:                                                                 Vitals:            Constitutional:    /72 (BP Location: Left arm, Patient Position: Sitting, Cuff Size: Standard)   Pulse 65   Ht 5' 2" (1 575 m)   Wt 55 3 kg (122 lb)   BMI 22 31 kg/m²   BP Readings from Last 3 Encounters:   02/13/23 119/72   11/16/22 125/75   11/16/22 118/74     Pulse Readings from Last 3 Encounters:   02/13/23 65   11/16/22 (!) 110   11/16/22 (!) 112         Well developed, well nourished, well groomed  Psychiatric:  Normal behavior and appropriate affect        Neurological Examination:     Mental status/cognitive function:   Recent and remote memory appear intact  Attention span and concentration as well as fund of knowledge are appropriate for age  Normal language and spontaneous speech  Cranial Nerves:   VII-facial expression symmetric  Motor Exam: symmetric bulk throughout  no atrophy, fasciculations or abnormal movements noted     Coordination:  no apparent dysmetria, ataxia or tremor noted  Gait: steady casual gait        Pertinent lab results:   See EMR for recent labs  - 6/6/22  CMP and CBC unremarkable except for WBC 10 29  - 4/30/20 HIV, RPR NR  TSH normal     Imaging: I have personally reviewed imaging and radiology read   Mri Brain wo contrast 5/4/22: A few small scattered white matter hyperintensities are seen within the frontal white matter bilaterally   These are nonspecific for a patient of this age     Review of Systems:   ROS obtained by medical assistant Personally reviewed and updated if indicated  I recommended PCP follow up for non neurologic problems  Review of Systems   Constitutional: Negative  Negative for appetite change and fever  HENT: Negative  Negative for hearing loss, tinnitus, trouble swallowing and voice change  Eyes: Negative  Negative for photophobia, pain and visual disturbance  Respiratory: Negative  Negative for shortness of breath  Cardiovascular: Negative  Negative for palpitations  Gastrointestinal: Negative  Negative for nausea and vomiting  Endocrine: Negative  Negative for cold intolerance  Genitourinary: Negative  Negative for dysuria, frequency and urgency  Musculoskeletal: Negative  Negative for gait problem, myalgias and neck pain  Skin: Negative  Negative for rash  Allergic/Immunologic: Negative  Neurological: Negative  Negative for dizziness, tremors, seizures, syncope, facial asymmetry, speech difficulty, weakness, light-headedness, numbness and headaches  Hematological: Negative  Does not bruise/bleed easily  Psychiatric/Behavioral: Negative  Negative for confusion, hallucinations and sleep disturbance  All other systems reviewed and are negative  I have spent 11 minutes with Patient  today in which greater than 50% of this time was spent in counseling/coordination of care  I also spent 10 minutes non face to face for this patient the same day         Author:  Tessa Aguila MD 2/13/2023 3:18 PM

## 2023-03-15 ENCOUNTER — OFFICE VISIT (OUTPATIENT)
Dept: FAMILY MEDICINE CLINIC | Facility: CLINIC | Age: 23
End: 2023-03-15

## 2023-03-15 VITALS
RESPIRATION RATE: 16 BRPM | DIASTOLIC BLOOD PRESSURE: 64 MMHG | TEMPERATURE: 97.6 F | BODY MASS INDEX: 23.3 KG/M2 | SYSTOLIC BLOOD PRESSURE: 102 MMHG | WEIGHT: 126.6 LBS | HEART RATE: 85 BPM | OXYGEN SATURATION: 98 % | HEIGHT: 62 IN

## 2023-03-15 DIAGNOSIS — Z11.59 ENCOUNTER FOR HEPATITIS C SCREENING TEST FOR LOW RISK PATIENT: ICD-10-CM

## 2023-03-15 DIAGNOSIS — R73.01 ELEVATED FASTING GLUCOSE: ICD-10-CM

## 2023-03-15 DIAGNOSIS — Z11.3 ROUTINE SCREENING FOR STI (SEXUALLY TRANSMITTED INFECTION): ICD-10-CM

## 2023-03-15 DIAGNOSIS — Z11.4 SCREENING FOR HIV (HUMAN IMMUNODEFICIENCY VIRUS): ICD-10-CM

## 2023-03-15 DIAGNOSIS — R45.89 DEPRESSED MOOD: ICD-10-CM

## 2023-03-15 DIAGNOSIS — Z23 ENCOUNTER FOR IMMUNIZATION: Primary | ICD-10-CM

## 2023-03-15 DIAGNOSIS — H60.503 ACUTE OTITIS EXTERNA OF BOTH EARS, UNSPECIFIED TYPE: ICD-10-CM

## 2023-03-15 DIAGNOSIS — Z13.220 SCREENING CHOLESTEROL LEVEL: ICD-10-CM

## 2023-03-15 DIAGNOSIS — J30.9 ALLERGIC RHINITIS, UNSPECIFIED SEASONALITY, UNSPECIFIED TRIGGER: ICD-10-CM

## 2023-03-15 DIAGNOSIS — G43.909 MIGRAINE WITHOUT STATUS MIGRAINOSUS, NOT INTRACTABLE, UNSPECIFIED MIGRAINE TYPE: ICD-10-CM

## 2023-03-15 RX ORDER — OFLOXACIN 3 MG/ML
10 SOLUTION AURICULAR (OTIC) 2 TIMES DAILY
Qty: 10 ML | Refills: 0 | Status: SHIPPED | OUTPATIENT
Start: 2023-03-15

## 2023-03-15 RX ORDER — FLUTICASONE PROPIONATE 50 MCG
1 SPRAY, SUSPENSION (ML) NASAL DAILY
Qty: 16 G | Refills: 2 | Status: SHIPPED | OUTPATIENT
Start: 2023-03-15

## 2023-03-15 RX ORDER — CETIRIZINE HYDROCHLORIDE 10 MG/1
10 TABLET ORAL DAILY
Qty: 90 TABLET | Refills: 1 | Status: SHIPPED | OUTPATIENT
Start: 2023-03-15

## 2023-03-15 NOTE — PROGRESS NOTES
ADULT ANNUAL PHYSICAL  [facility]    NAME: Sherif Cohen  AGE: 25 y o  SEX: female  : 2000     DATE: 3/17/2023     Assessment and Plan:     Problem List Items Addressed This Visit        Cardiovascular and Mediastinum    Migraine without status migrainosus, not intractable    Relevant Medications    mirtazapine (REMERON) 7 5 MG tablet       Other    Depressed mood    Relevant Medications    mirtazapine (REMERON) 7 5 MG tablet   Other Visit Diagnoses     Encounter for immunization    -  Primary    Relevant Orders    influenza vaccine, quadrivalent, 0 5 mL, preservative-free, for adult and pediatric patients 6 mos+ (AFLURIA, FLUARIX, FLULAVAL, FLUZONE) (Completed)    Allergic rhinitis, unspecified seasonality, unspecified trigger        Relevant Medications    cetirizine (ZyrTEC) 10 mg tablet    fluticasone (FLONASE) 50 mcg/act nasal spray    Acute otitis externa of both ears, unspecified type        Relevant Medications    ofloxacin (FLOXIN) 0 3 % otic solution    Routine screening for STI (sexually transmitted infection)        Relevant Orders    RPR-Syphilis Screening (Total Syphilis IGG/IGM)    Chlamydia/GC amplified DNA by PCR    Screening for HIV (human immunodeficiency virus)        Relevant Orders    : HIV 1/2 AB/AG w Reflex [facility] for 2 yr old and above    Encounter for hepatitis C screening test for low risk patient        Relevant Orders    Hepatitis C antibody    Screening cholesterol level        Relevant Orders    Lipid panel    Elevated fasting glucose        Relevant Orders    Basic metabolic panel          Immunizations and preventive care screenings were discussed with patient today  Appropriate education was printed on patient's after visit summary  Counseling:  Alcohol/drug use: discussed moderation in alcohol intake, the recommendations for healthy alcohol use, and avoidance of illicit drug use    Dental Health: discussed importance of regular tooth brushing, flossing, and dental visits  Injury prevention: discussed safety/seat belts, safety helmets, smoke detectors, carbon dioxide detectors, and smoking near bedding or upholstery  Sexual health: discussed sexually transmitted diseases, partner selection, use of condoms, avoidance of unintended pregnancy, and contraceptive alternatives  · Exercise: the importance of regular exercise/physical activity was discussed  Recommend exercise 3-5 times per week for at least 30 minutes  Return in about 1 year (around 3/15/2024)  Chief Complaint:     Chief Complaint   Patient presents with   • Physical Exam     24 y/o       History of Present Illness:     Adult Annual Physical   Patient here for a comprehensive physical exam  The patient reports no problems  Diet and Physical Activity  · Diet/Nutrition: well balanced diet  · Exercise: no formal exercise  Depression Screening  PHQ-2/9 Depression Screening    Little interest or pleasure in doing things: 0 - not at all  Feeling down, depressed, or hopeless: 0 - not at all       [address]  · Sleep: sleeps well  · Hearing: normal - bilateral   · Vision: no vision problems  · Dental: regular dental visits  /GYN Health  · Last menstrual period: 3/15/2023  · Contraceptive method: oral contraceptives  · History of STDs?: no      Review of Systems:     Review of Systems   Constitutional: Negative for activity change, appetite change, chills, fatigue, fever and unexpected weight change  HENT: Negative for hearing loss, nosebleeds, sinus pain, sneezing, sore throat and trouble swallowing  Eyes: Negative for photophobia and visual disturbance  Respiratory: Negative for cough, chest tightness, shortness of breath and wheezing  Cardiovascular: Negative for chest pain, palpitations and leg swelling  Gastrointestinal: Negative for abdominal pain, constipation, nausea and vomiting  Genitourinary: Negative for decreased urine volume, difficulty urinating, dysuria, flank pain, genital sores, hematuria and urgency  Musculoskeletal: Negative for back pain and gait problem  Skin: Negative for pallor, rash and wound  Neurological: Negative for dizziness, seizures, syncope, weakness, numbness and headaches  Hematological: Negative for adenopathy  Does not bruise/bleed easily  Psychiatric/Behavioral: Negative for confusion, hallucinations, self-injury, sleep disturbance and suicidal ideas  The patient is not nervous/anxious  Past Medical History:     Past Medical History:   Diagnosis Date   • ADHD (attention deficit hyperactivity disorder)     last seen 1 year ago, no recent RX, was on adderal, off at least 1 year    • Migraine    • OCD (obsessive compulsive disorder)     no treatment      Past Surgical History:     No past surgical history on file  Social History:     Social History     Socioeconomic History   • Marital status: Single     Spouse name: None   • Number of children: None   • Years of education: None   • Highest education level: None   Occupational History   • None   Tobacco Use   • Smoking status: Never   • Smokeless tobacco: Never   Vaping Use   • Vaping Use: Never used   Substance and Sexual Activity   • Alcohol use: No   • Drug use: Not Currently     Types: Marijuana   • Sexual activity: Yes     Birth control/protection: Pill     Comment: patient's cell [phone]; lives with Mom, may stay at boyfriends house on [address]  Other Topics Concern   • None   Social History Narrative    Lives with mother and sister  Social Determinants of Health     Financial Resource Strain: Not on file   Food Insecurity: Unknown   • Worried About [address] in the Last Year: Patient refused   • [address] in the Last Year: Patient refused   Transportation Needs: No Transportation Needs   • Lack of Transportation (Medical):  No   • Lack of Transportation (Non-Medical): No   Physical Activity: Not on file   Stress: Not on file   Social Connections: Not on file   Intimate Partner Violence: Not on file   Housing Stability: Not on file      Family History:     Family History   Problem Relation Age of Onset   • Migraines Mother    • ADD / ADHD Father    • Heart disease Maternal Grandmother    • Diabetes Maternal Grandmother       Current Medications:     Current Outpatient Medications   Medication Sig Dispense Refill   • cetirizine (ZyrTEC) 10 mg tablet Take 1 tablet (10 mg total) by mouth daily 90 tablet 1   • fluticasone (FLONASE) 50 mcg/act nasal spray 1 spray into each nostril daily 16 g 2   • mirtazapine (REMERON) 7 5 MG tablet Take 1 tablet (7 5 mg total) by mouth daily at bedtime 30 tablet 5   • ofloxacin (FLOXIN) 0 3 % otic solution Administer 10 drops into both ears 2 (two) times a day 10 mL 0   • norgestimate-ethinyl estradiol (ORTHO-CYCLEN) 0 25-35 MG-MCG per tablet Take 1 tablet by mouth in the morning  28 tablet 11   • rizatriptan (MAXALT) 10 mg tablet Take 1 tablet (10 mg total) by mouth once as needed for migraine May repeat in 2 hours if needed  Max 2/24 hours, 9/month  9 tablet 12     No current facility-administered medications for this visit  Allergies:     No Known Allergies   Physical Exam:     /64 (BP Location: Left arm, Patient Position: Sitting, Cuff Size: Standard)   Pulse 85   Temp 97 6 °F (36 4 °C) (Temporal)   Resp 16   Ht 5' 2" (1 575 m)   Wt 57 4 kg (126 lb 9 6 oz)   LMP 03/15/2023 (Exact Date) Comment: currently on it  SpO2 98%   BMI 23 16 kg/m²     Physical Exam  Vitals and nursing note reviewed  Constitutional:       General: She is not in acute distress  Appearance: Normal appearance  She is not diaphoretic  HENT:      Head: Normocephalic        Right Ear: Tympanic membrane and external ear normal       Left Ear: Tympanic membrane and external ear normal       Nose: Nose normal       Mouth/Throat:      Mouth: Mucous membranes are moist    Eyes:      General:         Right eye: No discharge  Left eye: No discharge  Extraocular Movements: Extraocular movements intact  Conjunctiva/sclera: Conjunctivae normal       Pupils: Pupils are equal, round, and reactive to light  Cardiovascular:      Rate and Rhythm: Normal rate and regular rhythm  Pulses: Normal pulses  Heart sounds: Normal heart sounds  Pulmonary:      Effort: Pulmonary effort is normal  No respiratory distress  Breath sounds: Normal breath sounds  Abdominal:      General: Bowel sounds are normal  There is no distension  Palpations: Abdomen is soft  Musculoskeletal:         General: Normal range of motion  Cervical back: Normal range of motion and neck supple  No rigidity  Right lower leg: No edema  Left lower leg: No edema  Lymphadenopathy:      Cervical: No cervical adenopathy  Skin:     General: Skin is warm and dry  Capillary Refill: Capillary refill takes less than 2 seconds  Findings: No rash  Neurological:      General: No focal deficit present  Mental Status: She is alert and oriented to person, place, and time     Psychiatric:         Mood and Affect: Mood normal          Behavior: Behavior normal           [address]

## 2023-03-15 NOTE — PATIENT INSTRUCTIONS
Wellness Visit for Adults   AMBULATORY CARE:   A wellness visit  is when you see your healthcare provider to get screened for health problems  Your healthcare provider will also give you advice on how to stay healthy  Write down your questions so you remember to ask them  Ask your healthcare provider how often you should have a wellness visit  What happens at a wellness visit:  Your healthcare provider will ask about your health, and your family history of health problems  This includes high blood pressure, heart disease, and cancer  He or she will ask if you have symptoms that concern you, if you smoke, and about your mood  You may also be asked about your intake of medicines, supplements, food, and alcohol  Any of the following may be done: Your weight  will be checked  Your height may also be checked so your body mass index (BMI) can be calculated  Your BMI shows if you are at a healthy weight  Your blood pressure  and heart rate will be checked  Your temperature may also be checked  Blood and urine tests  may be done  Blood tests may be done to check your cholesterol levels  Abnormal cholesterol levels increase your risk for heart disease and stroke  You may also need a blood or urine test to check for diabetes if you are at increased risk  Urine tests may be done to look for signs of an infection or kidney disease  A physical exam  includes checking your heartbeat and lungs with a stethoscope  Your healthcare provider may also check your skin to look for sun damage  Screening tests  may be recommended  A screening test is done to check for diseases that may not cause symptoms  The screening tests you may need depend on your age, gender, family history, and lifestyle habits  For example, colorectal screening may be recommended if you are 48years old or older  Screening tests you need if you are a woman:   A Pap smear  is used to screen for cervical cancer   Pap smears are usually done every 3 to 5 years depending on your age  You may need them more often if you have had abnormal Pap smear test results in the past  Ask your healthcare provider how often you should have a Pap smear  A mammogram  is an x-ray of your breasts to screen for breast cancer  Experts recommend mammograms every 2 years starting at age 48 years  You may need a mammogram at age 52 years or younger if you have an increased risk for breast cancer  Talk to your healthcare provider about when you should start having mammograms and how often you need them  Vaccines you may need:   Get an influenza vaccine  every year  The influenza vaccine protects you from the flu  Several types of viruses cause the flu  The viruses change over time, so new vaccines are made each year  Get a tetanus-diphtheria (Td) booster vaccine  every 10 years  This vaccine protects you against tetanus and diphtheria  Tetanus is a severe infection that may cause painful muscle spasms and lockjaw  Diphtheria is a severe bacterial infection that causes a thick covering in the back of your mouth and throat  Get a human papillomavirus (HPV) vaccine  if you are female and aged 23 to 32 or male 23 to 24 and never received it  This vaccine protects you from HPV infection  HPV is the most common infection spread by sexual contact  HPV may also cause vaginal, penile, and anal cancers  Get a pneumococcal vaccine  if you are aged 72 years or older  The pneumococcal vaccine is an injection given to protect you from pneumococcal disease  Pneumococcal disease is an infection caused by pneumococcal bacteria  The infection may cause pneumonia, meningitis, or an ear infection  Get a shingles vaccine  if you are 60 or older, even if you have had shingles before  The shingles vaccine is an injection to protect you from the varicella-zoster virus  This is the same virus that causes chickenpox   Shingles is a painful rash that develops in people who had chickenpox or have been exposed to the virus  How to eat healthy:  My Plate is a model for planning healthy meals  It shows the types and amounts of foods that should go on your plate  Fruits and vegetables make up about half of your plate, and grains and protein make up the other half  A serving of dairy is included on the side of your plate  The amount of calories and serving sizes you need depends on your age, gender, weight, and height  Examples of healthy foods are listed below:  Eat a variety of vegetables  such as dark green, red, and orange vegetables  You can also include canned vegetables low in sodium (salt) and frozen vegetables without added butter or sauces  Eat a variety of fresh fruits , canned fruit in 100% juice, frozen fruit, and dried fruit  Include whole grains  At least half of the grains you eat should be whole grains  Examples include whole-wheat bread, wheat pasta, brown rice, and whole-grain cereals such as oatmeal     Eat a variety of protein foods such as seafood (fish and shellfish), lean meat, and poultry without skin (turkey and chicken)  Examples of lean meats include pork leg, shoulder, or tenderloin, and beef round, sirloin, tenderloin, and extra lean ground beef  Other protein foods include eggs and egg substitutes, beans, peas, soy products, nuts, and seeds  Choose low-fat dairy products such as skim or 1% milk or low-fat yogurt, cheese, and cottage cheese  Limit unhealthy fats  such as butter, hard margarine, and shortening  Exercise:  Exercise at least 30 minutes per day on most days of the week  Some examples of exercise include walking, biking, dancing, and swimming  You can also fit in more physical activity by taking the stairs instead of the elevator or parking farther away from stores  Include muscle strengthening activities 2 days each week  Regular exercise provides many health benefits   It helps you manage your weight, and decreases your risk for type 2 diabetes, heart disease, stroke, and high blood pressure  Exercise can also help improve your mood  Ask your healthcare provider about the best exercise plan for you  General health and safety guidelines:   Do not smoke  Nicotine and other chemicals in cigarettes and cigars can cause lung damage  Ask your healthcare provider for information if you currently smoke and need help to quit  E-cigarettes or smokeless tobacco still contain nicotine  Talk to your healthcare provider before you use these products  Limit alcohol  A drink of alcohol is 12 ounces of beer, 5 ounces of wine, or 1½ ounces of liquor  Lose weight, if needed  Being overweight increases your risk of certain health conditions  These include heart disease, high blood pressure, type 2 diabetes, and certain types of cancer  Protect your skin  Do not sunbathe or use tanning beds  Use sunscreen with a SPF 15 or higher  Apply sunscreen at least 15 minutes before you go outside  Reapply sunscreen every 2 hours  Wear protective clothing, hats, and sunglasses when you are outside  Drive safely  Always wear your seatbelt  Make sure everyone in your car wears a seatbelt  A seatbelt can save your life if you are in an accident  Do not use your cell phone when you are driving  This could distract you and cause an accident  Pull over if you need to make a call or send a text message  Practice safe sex  Use latex condoms if are sexually active and have more than one partner  Your healthcare provider may recommend screening tests for sexually transmitted infections (STIs)  Wear helmets, lifejackets, and protective gear  Always wear a helmet when you ride a bike or motorcycle, go skiing, or play sports that could cause a head injury  Wear protective equipment when you play sports  Wear a lifejacket when you are on a boat or doing water sports      © Copyright [employer] 2022 Information is for End User's use only and may not be sold, redistributed or otherwise used for commercial purposes  The above information is an  only  It is not intended as medical advice for individual conditions or treatments  Talk to your doctor, nurse or pharmacist before following any medical regimen to see if it is safe and effective for you

## 2023-03-17 RX ORDER — MIRTAZAPINE 7.5 MG/1
7.5 TABLET, FILM COATED ORAL
Qty: 30 TABLET | Refills: 5 | Status: SHIPPED | OUTPATIENT
Start: 2023-03-17

## 2023-05-11 DIAGNOSIS — R45.89 DEPRESSED MOOD: ICD-10-CM

## 2023-05-11 DIAGNOSIS — G43.909 MIGRAINE WITHOUT STATUS MIGRAINOSUS, NOT INTRACTABLE, UNSPECIFIED MIGRAINE TYPE: ICD-10-CM

## 2023-05-12 RX ORDER — MIRTAZAPINE 7.5 MG/1
7.5 TABLET, FILM COATED ORAL
Qty: 30 TABLET | Refills: 0 | Status: SHIPPED | OUTPATIENT
Start: 2023-05-12

## 2023-05-13 DIAGNOSIS — Z30.41 ENCOUNTER FOR SURVEILLANCE OF CONTRACEPTIVE PILLS: ICD-10-CM

## 2023-05-15 ENCOUNTER — PATIENT MESSAGE (OUTPATIENT)
Dept: NEUROLOGY | Facility: CLINIC | Age: 23
End: 2023-05-15

## 2023-05-15 RX ORDER — NORGESTIMATE AND ETHINYL ESTRADIOL 0.25-0.035
1 KIT ORAL DAILY
Qty: 28 TABLET | Refills: 2 | Status: SHIPPED | OUTPATIENT
Start: 2023-05-15

## 2023-05-18 ENCOUNTER — TELEPHONE (OUTPATIENT)
Dept: NEUROLOGY | Facility: CLINIC | Age: 23
End: 2023-05-18

## 2023-05-18 NOTE — TELEPHONE ENCOUNTER
Called patient and left a voicemail  Told patient to please call the office back to schedule a followup with Dr Rhina Edmonds at her earliest convenience  Regarding: FW:   Contact: [phone]  Could you help her get in for a follow-up appointment please  ----- Message -----  From: Krzysztof Ramos  Sent: 5/17/2023   7:04 PM EDT  To: Paz Castillo MD  Subject:                                                  Silverio Sousa a migraine preventative?

## 2023-05-31 ENCOUNTER — ANNUAL EXAM (OUTPATIENT)
Dept: OBGYN CLINIC | Facility: CLINIC | Age: 23
End: 2023-05-31

## 2023-05-31 VITALS
WEIGHT: 123.2 LBS | SYSTOLIC BLOOD PRESSURE: 117 MMHG | HEART RATE: 68 BPM | BODY MASS INDEX: 22.53 KG/M2 | DIASTOLIC BLOOD PRESSURE: 64 MMHG

## 2023-05-31 DIAGNOSIS — Z30.41 ENCOUNTER FOR SURVEILLANCE OF CONTRACEPTIVE PILLS: ICD-10-CM

## 2023-05-31 DIAGNOSIS — Z01.419 ENCOUNTER FOR ANNUAL ROUTINE GYNECOLOGICAL EXAMINATION: Primary | ICD-10-CM

## 2023-05-31 RX ORDER — NORGESTIMATE AND ETHINYL ESTRADIOL 0.25-0.035
1 KIT ORAL DAILY
Qty: 28 TABLET | Refills: 11 | Status: SHIPPED | OUTPATIENT
Start: 2023-05-31

## 2023-05-31 NOTE — PATIENT INSTRUCTIONS
STD results can take up to 2 weeks  Remember safe sex and condom use  Call with needs or concerns  Continue birth control pills as directed      Lucero Hamm

## 2023-05-31 NOTE — PROGRESS NOTES
Annual Exam    Assessment   1  Encounter for annual routine gynecological examination        2  Encounter for surveillance of contraceptive pills  norgestimate-ethinyl estradiol (ORTHO-CYCLEN) 0 25-35 MG-MCG per tablet        well woman       Plan       All questions answered  Breast self exam technique reviewed and patient encouraged to perform self-exam monthly  Contraception: OCP (estrogen/progesterone)  Discussed healthy lifestyle modifications  Follow up in 1 year  Patient Instructions   STD results can take up to 2 weeks  Remember safe sex and condom use  Call with needs or concerns  Continue birth control pills as directed  Pt verbalized understanding of all discussed  Sury Pereira is a 26 y o  Solomon Kemper female who presents for annual well woman exam  Periods are regular every 28-30 days, lasting 7 days  No intermenstrual bleeding, spotting, or discharge  2022 WNL PAP  1 partner x 5 years, denies domestic violence    Depression Screening Follow-up Plan: Patient's depression screening was negative with a PHQ-2 score of 0  Their PHQ-9 score was 0  Clinically patient does not have depression  No treatment is required  Current contraception: OCP (estrogen/progesterone)  History of abnormal Pap smear: no  Family history of uterine or ovarian cancer: no  Regular self breast exam: yes  History of abnormal mammogram: N/A  Family history of breast cancer: no  History of abnormal lipids: unknown    Menstrual History:  OB History        0    Para   0    Term   0       0    AB   0    Living   0       SAB   0    IAB   0    Ectopic   0    Multiple   0    Live Births   0                Menarche age: 16  Patient's last menstrual period was 2023 (approximate)         The following portions of the patient's history were reviewed and updated as appropriate: allergies, current medications, past family history, past medical history, past social history, past surgical history and problem list     Review of Systems  Pertinent items are noted in HPI        Objective      /64   Pulse 68   Wt 55 9 kg (123 lb 3 2 oz)   LMP 04/26/2023 (Approximate)   BMI 22 53 kg/m²     General: alert and oriented, in no acute distress, alert, appears stated age and cooperative   Heart: regular rate and rhythm, S1, S2 normal, no murmur, click, rub or gallop   Lungs: clear to auscultation bilaterally, WNL respiratory effort, negative cough or SOB   Thyroid: Negative masses   Abdomen: soft, non-tender, without masses or organomegaly   Vulva: normal   Vagina: normal mucosa   Cervix: no cervical motion tenderness and no lesions   Uterus: normal size, non-tender, normal shape and consistency   Adnexa: normal adnexa   Urethra: normal   Breasts: NT,negative masses, discharge, or dimpling

## 2023-06-01 ENCOUNTER — TELEPHONE (OUTPATIENT)
Dept: NEUROLOGY | Facility: CLINIC | Age: 23
End: 2023-06-01

## 2023-06-01 NOTE — TELEPHONE ENCOUNTER
Called patient and left voicemail to confirm their upcoming appointment with Dr Diane Gu  Informed patient about arriving in the [place] location 15 minutes prior to appointment to get checked in and go over chart

## 2023-06-06 ENCOUNTER — OFFICE VISIT (OUTPATIENT)
Dept: NEUROLOGY | Facility: CLINIC | Age: 23
End: 2023-06-06
Payer: MEDICARE

## 2023-06-06 VITALS
DIASTOLIC BLOOD PRESSURE: 71 MMHG | TEMPERATURE: 98.2 F | HEIGHT: 62 IN | SYSTOLIC BLOOD PRESSURE: 108 MMHG | HEART RATE: 72 BPM | BODY MASS INDEX: 22.63 KG/M2 | WEIGHT: 123 LBS

## 2023-06-06 DIAGNOSIS — G43.009 MIGRAINE WITHOUT AURA AND WITHOUT STATUS MIGRAINOSUS, NOT INTRACTABLE: Primary | ICD-10-CM

## 2023-06-06 PROCEDURE — 99214 OFFICE O/P EST MOD 30 MIN: CPT | Performed by: PSYCHIATRY & NEUROLOGY

## 2023-06-06 RX ORDER — TOPIRAMATE 25 MG/1
TABLET ORAL
Qty: 120 TABLET | Refills: 4 | Status: SHIPPED | OUTPATIENT
Start: 2023-06-06

## 2023-06-06 NOTE — PATIENT INSTRUCTIONS
"  - \"Rewire Your Anxious Brain: How to Use the Neuroscience of Fear to End Anxiety, Panic, and Worry\" By Ga Wiley PhD      Headache/migraine treatment:   Rescue medications (for immediate treatment of a headache): It is ok to take ibuprofen, acetaminophen or naproxen (Advil, Tylenol,  Aleve, Excedrin) if they help your headaches you should limit these to No more than 3 times a week to avoid medication overuse/rebound headaches  For your more moderate to severe migraines take this medication early   Maxalt (rizatriptan) 10mg tabs - take one at the onset of headache  May repeat one time after 2 hours if pain has not resolved  (Max 2 a day and 9 a month)       Prescription preventive medications for headaches/migraines   (to take every day to help prevent headaches - not to take at the time of headache):  [x] - Topiramate 25 mg nightly for 1 week, then increase to 25 mg in a m  And 25 mg in p m  For 1 week, then take 25 mg in a m  And 50 mg in p m  For 1 week, then take 50 mg in a m  and 50 mg in p m  And continue    - generally the common side effects improve as your body gets used to the medication  If we need to spread out a more gradual increase of the medication on a longer scale we can, just call if any questions or concerns  - if necessary, if the a m  dose is causing side effects we can always have you take the full dose at night instead    - important to know per data, this medication may, but typically does not affect birth control unless you are taking 200 mg daily or more and I highly recommend being on birth control while on this medication due to possible significant detrimental effects to fetus if you were to get pregnant     If needed next :  Emgality/Galcanezumab - the 1st dose is 240 mg loading dose of 2 consecutive 120 mg injections  Thereafter, 120 mg injections every 30 days    If needed there is a coupon card for the copay at [facility]  com     READ INSTRUCTIONS that come with the " medication  REFRIGERATE  Keep out of direct sunlight  Prior to administration, allow to come to room temperature for 30 minutes  Do not warm using a heat source (eg, microwave or hot water)  Do not shake  Administer in preferably abdomen (avoiding 2 inches around the navel), thigh, upper arm, or buttocks avoiding areas of skin that are tender, bruised, red or hard  Deliver entire contents of single-use prefilled pen or syringe  Unknown impact in pregnancy therefore would recommend stopping 6 months prior to considering pregnancy  The medication you are taking may impact pregnancy  It has been associated with birth defects and learning problems if taken during pregnancy  Thus, it is important to avoid unplanned pregnancy while taking this medication  In the future, if you plan to become pregnant, then you should discuss this with your neurologist since medication adjustments may be indicated  *Typically these types of medications take time until you see the benefit, although some may see improvement in days, often it may take weeks, especially if the medication is being titrated up to a beneficial level  Please contact us if there are any concerns or questions regarding the medication  Lifestyle Recommendations:  [x] SLEEP - Maintain a regular sleep schedule: Adults need at least 7-8 hours of uninterrupted a night  Maintain good sleep hygiene:  Going to bed and waking up at consistent times, avoiding excessive daytime naps, avoiding caffeinated beverages in the evening, avoid excessive stimulation in the evening and generally using bed primarily for sleeping  One hour before bedtime would recommend turning lights down lower, decreasing your activity (may read quietly, listen to music at a low volume)  When you get into bed, should eliminate all technology (no texting, emailing, playing with your phone, iPad or tablet in bed)  [x] HYDRATION - Maintain good hydration    Drink  2L of fluid a day (4 typical small water bottles)  [x] DIET - Maintain good nutrition  In particular don't skip meals and try and eat healthy balanced meals regularly  [x] TRIGGERS - Look for other triggers and avoid them: Limit caffeine to 1-2 cups a day or less  Avoid dietary triggers that you have noticed bring on your headaches (this could include aged cheese, peanuts, MSG, aspartame and nitrates)  [x] EXERCISE - physical exercise as we all know is good for you in many ways, and not only is good for your heart, but also is beneficial for your mental health, cognitive health and  chronic pain/headaches  I would encourage at the least 5 days of physical exercise weekly for at least 30 minutes  Education and Follow-up  [x] Please call with any questions or concerns  Of course if any new concerning symptoms go to the emergency department    [x] Follow up 3-4 months, sooner if needed

## 2023-06-06 NOTE — PROGRESS NOTES
Review of Systems   Constitutional: Negative for appetite change and fever  HENT: Negative  Negative for hearing loss, tinnitus, trouble swallowing and voice change  Eyes: Negative  Negative for photophobia and pain  Respiratory: Negative  Negative for shortness of breath  Cardiovascular: Negative  Negative for palpitations  Gastrointestinal: Negative  Negative for nausea and vomiting  Endocrine: Negative  Negative for cold intolerance  Genitourinary: Negative  Negative for dysuria, frequency and urgency  Musculoskeletal: Negative  Negative for myalgias and neck pain  Skin: Negative  Negative for rash  Neurological: Negative  Negative for dizziness, tremors, seizures, syncope, facial asymmetry, speech difficulty, weakness, light-headedness, numbness and headaches  Hematological: Negative  Does not bruise/bleed easily  Psychiatric/Behavioral: Negative  Negative for confusion, hallucinations and sleep disturbance

## 2023-06-06 NOTE — PROGRESS NOTES
[facility] Consult - Follow up   PATIENT:  Shai Toledo  MRN:  [id number]  :  2000  DATE OF SERVICE:  2023  REFERRED BY: No ref  provider found  PMD: SIL Hair    Assessment/Plan:   Shai Toledo is a delightful  26 y o  female with a past medical history that includes Migraine, anxiety, ADHD, Depression, OCD, intermittent palpitations referred here for evaluation of headache  My initial evaluation 8/15/2022    Migraine without aura and without status migrainosus, not intractable  She reports a long history of headaches and migraines dating back to her teens as well as a family history in mom  She has followed with peds neuro in the past and has had MRI Brain  She reports pain is typical right frontal/temporal/retro-orbital, without aura and with typical associated migrainous features  - as of 8/15/2022: migraines frequency better lately, on average 10 a month  She is currently on mirtazapine for mood/sleep but only takes occasionally and we discussed first would try this consistently to see if treatment of the latter could also decrease migraines and get sleep back on track prior to work starting soon  Otherwise we discussed could consider low dose propranolol if tolerated or CGRP med for prevention  Trial of rizatriptan for abortive  - as of 2023: Reports significant improvement since last visit and she loves rizatriptan which works well for rescue  Migraines 1-2 a month on average  Stress better, sleeping better and taking mirtazapine nightly  - as of 2023: She reports migraines on average 4-5 a month although last month was over 9 and therefore she ran out of rizatriptan and thus this visit moved up to discuss preventative options  We discussed trial of topiramate for prevention  No plans on pregnancy for 5 years or more      Workup:  - Neurologic assessment reveals unremarkable neurological exam   - MRI Brain wo contrast "5/4/22: A few small scattered white matter hyperintensities are seen within the frontal white matter bilaterally  These are nonspecific for a patient of this age  Preventative:  - we discussed headache hygiene and lifestyle factors that may improve headaches  -    Trial of topiramate (TOPAMAX) 25 mg tablet; 1 tab PO QHS for 1 week, increase as tolerated to 1 tab BID for 1 week, then 1 tab QAM and 2 tabs QHS for 1 week and finish at 2 tabs BID  Discussed proper use, possible side effects and risks  - On through other providers: mirtazapine 7 5 mg nightly   - Past/ failed/contraindicated: amitriptyline, Topiramate, antihypertensive would be contraindicated due to hypotension  - future options: CGRP med such as emgality next, botox    Rescue:  - discussed not taking over-the-counter or prescription pain medications more than 3 days per week to prevent medication overuse/rebound headache  -     rizatriptan (MAXALT) 10 mg tablet; Take 1 tablet (10 mg total) by mouth once as needed for migraine May repeat in 2 hours if needed  Max 2/24 hours, 9/month  Discussed proper use, possible side effects and risks  - Past/ failed/contraindicated: sumatriptan 25 mg worked sometimes only, I do not recommend Ainsley Dux caused an anxiety attack due to heart palpitations   - past/helped: migraine cocktails in ED, steroids, toradol IM   - future options:   prochlorperazine, Toradol IM or p o , could consider trial of 5 days of Depakote 500 mg nightly or dexamethasone 2 mg daily for prolonged migraine, angelo Solano, [facility]      Patient instructions      - \"Rewire Your Anxious Brain: How to Use the Neuroscience of Fear to End Anxiety, Panic, and Worry\" By Howard Arredondo PhD      Headache/migraine treatment:   Rescue medications (for immediate treatment of a headache):    It is ok to take ibuprofen, acetaminophen or naproxen (Advil, Tylenol,  Aleve, Excedrin) if they help your headaches you should limit these to No more than 3 times " a week to avoid medication overuse/rebound headaches  For your more moderate to severe migraines take this medication early   Maxalt (rizatriptan) 10mg tabs - take one at the onset of headache  May repeat one time after 2 hours if pain has not resolved  (Max 2 a day and 9 a month)       Prescription preventive medications for headaches/migraines   (to take every day to help prevent headaches - not to take at the time of headache):  [x] - Topiramate 25 mg nightly for 1 week, then increase to 25 mg in a m  And 25 mg in p m  For 1 week, then take 25 mg in a m  And 50 mg in p m  For 1 week, then take 50 mg in a m  and 50 mg in p m  And continue    - generally the common side effects improve as your body gets used to the medication  If we need to spread out a more gradual increase of the medication on a longer scale we can, just call if any questions or concerns  - if necessary, if the a m  dose is causing side effects we can always have you take the full dose at night instead    - important to know per data, this medication may, but typically does not affect birth control unless you are taking 200 mg daily or more and I highly recommend being on birth control while on this medication due to possible significant detrimental effects to fetus if you were to get pregnant     If needed next :  Emgality/Galcanezumab - the 1st dose is 240 mg loading dose of 2 consecutive 120 mg injections  Thereafter, 120 mg injections every 30 days    If needed there is a coupon card for the copay at [employer]     READ INSTRUCTIONS that come with the medication  REFRIGERATE  Keep out of direct sunlight  Prior to administration, allow to come to room temperature for 30 minutes  Do not warm using a heat source (eg, microwave or hot water)  Do not shake  Administer in preferably abdomen (avoiding 2 inches around the navel), thigh, upper arm, or buttocks avoiding areas of skin that are tender, bruised, red or hard   Deliver entire contents of single-use prefilled pen or syringe  Unknown impact in pregnancy therefore would recommend stopping 6 months prior to considering pregnancy  The medication you are taking may impact pregnancy  It has been associated with birth defects and learning problems if taken during pregnancy  Thus, it is important to avoid unplanned pregnancy while taking this medication  In the future, if you plan to become pregnant, then you should discuss this with your neurologist since medication adjustments may be indicated  *Typically these types of medications take time until you see the benefit, although some may see improvement in days, often it may take weeks, especially if the medication is being titrated up to a beneficial level  Please contact us if there are any concerns or questions regarding the medication  Lifestyle Recommendations:  [x] SLEEP - Maintain a regular sleep schedule: Adults need at least 7-8 hours of uninterrupted a night  Maintain good sleep hygiene:  Going to bed and waking up at consistent times, avoiding excessive daytime naps, avoiding caffeinated beverages in the evening, avoid excessive stimulation in the evening and generally using bed primarily for sleeping  One hour before bedtime would recommend turning lights down lower, decreasing your activity (may read quietly, listen to music at a low volume)  When you get into bed, should eliminate all technology (no texting, emailing, playing with your phone, iPad or tablet in bed)  [x] HYDRATION - Maintain good hydration  Drink  2L of fluid a day (4 typical small water bottles)  [x] DIET - Maintain good nutrition  In particular don't skip meals and try and eat healthy balanced meals regularly  [x] TRIGGERS - Look for other triggers and avoid them: Limit caffeine to 1-2 cups a day or less   Avoid dietary triggers that you have noticed bring on your headaches (this could include aged cheese, peanuts, MSG, aspartame and "nitrates)  [x] EXERCISE - physical exercise as we all know is good for you in many ways, and not only is good for your heart, but also is beneficial for your mental health, cognitive health and  chronic pain/headaches  I would encourage at the least 5 days of physical exercise weekly for at least 30 minutes  Education and Follow-up  [x] Please call with any questions or concerns  Of course if any new concerning symptoms go to the emergency department  [x] Follow up 3-4 months, sooner if needed       CC: We had the pleasure of evaluating Rona Frazier in neurological consultation today  Rona Frazier is a   right handed female who presents today for evaluation of headaches  History obtained from patient as well as available medical record review    History of Present Illness:   Interval history as of 6/6/2023  - no significant new or concerning neurologic symptoms since last visit   - sleep 6 hours at least, often less, no problems falling asleep with mirtazapine, bed at 11 and up at 8, not told she snores, talks in her sleep, sleeps on belly and legs bent  - no kids for 5 years     Headaches and migraines   On 5/13/2023 she requested more than 9 rizatriptan a month and we discussed if she is needing more than 9 a month she may want to consider a preventative medication  4-5 a month on avg, over 9 last month and shes not sure why    Preventative:   - On through other providers: mirtazapine 7 5 mg nightly - forgets maybe 3 a month     Abortive:   -Rizatriptan - no SE, takes a second one 40% of the time  Denies bothersome side effects        Interval history as of 2/13/2023  - denies any new or concerning neurologic symptoms since last visit     Headaches and migraines   Much better, maybe 1-2 month on average     Preventative:   - On through other providers: mirtazapine 7 5 mg nightly     Abortive:   Trial of rizatriptan 10 mg in place of sumatriptan 25 mg - \"I love it\"  Denies bothersome side effects " History as of initial visit 8/15/22:   She has followed with peds neurology in the past     Headaches started at what age? 15-12 years old  How often do the headaches occur?   - as of 8/15/2022: waxes and wanes, better lately, on average 10 a month   What time of the day do the headaches start? No particular time of day  How long do the headaches last? Days   Are you ever headache free? Yes    Aura? without aura     Last eye exam: wears glasses, 3/2022 normal except for glasses    Where is your headache located and pain quality?   - more on right temple and then can go to right retro-orbital and stays, can be throbbing/pulstating and sharp at times   - once in blue moon on the left    What is the intensity of pain? Average: 10/10, worst 10/10  Associated symptoms:   [] Nausea       [] Vomiting     [x] decreased appetite     [x] Photophobia     [x]Phonophobia      [] Osmophobia  [] Blurred vision   [x] Light-headed or dizzy  - sometimes   [] Tinnitus   [] Hands or feet tingle or feel numb/paresthesias    [] Ptosis      [] Facial droop  [] Lacrimation  [] Nasal congestion - always     Things that make the headache worse? No specific movements, any quick head movement     Headache triggers:  Stress, hormonal    Have you seen someone else for headaches or pain? Yes, neurology in the past   Have you had trigger point injection performed and how often? No  Have you had Botox injection performed and how often? No   Have you had epidural injections or transforaminal injections performed? No  Are you current pregnant or planning on getting pregnant? Not for years, on [facility]   Have you ever had any Brain imaging? yes     What medications do you take or have you taken for your headaches?    ABORTIVE:    OTC medications have been ineffective     Sumatriptan 25 mg - no SE     Past   Steroids - no SE  toradol IM   fioricet  Migraine cocktail    PREVENTIVE:     Mirtazapine - been on it about a year, takes when has a headache to help her sleep     Past/ failed/contraindicated:  Amitriptyline 25 mg       LIFESTYLE  Sleep   - averages: delayed during summer/late night owl during summer, avg 6 hours, rec sleep cycle    Water: 2 bottles per day  Caffeine: none per day    Mood: anxiety, depression, OCD, ADHD dx by psychiatry in the past, now with PCP, anxiety is not great     The following portions of the patient's history were reviewed and updated as appropriate: allergies, current medications, past family history, past medical history, past social history, past surgical history and problem list     Pertinent family history:  Family history of headaches:  migraine headaches in mother  Any family history of aneurysms - No    Pertinent social history:  Work: [employer] starts again in Sept 2022  Lives with mom     Past Medical History:     Past Medical History:   Diagnosis Date   • ADHD (attention deficit hyperactivity disorder)     last seen 1 year ago, no recent RX, was on adderal, off at least 1 year    • Migraine    • OCD (obsessive compulsive disorder)     no treatment       Patient Active Problem List   Diagnosis   • Encounter for surveillance of contraceptive pills   • ADHD (attention deficit hyperactivity disorder)   • Depressed mood   • OCD (obsessive compulsive disorder)   • High risk heterosexual behavior   • Other headache syndrome   • Anxiety   • Headache   • Abnormal CBC   • Intermittent palpitations   • Poor weight gain in adult   • Exposure to COVID-19 virus   • Migraine without status migrainosus, not intractable       Medications:      Current Outpatient Medications   Medication Sig Dispense Refill   • cetirizine (ZyrTEC) 10 mg tablet Take 1 tablet (10 mg total) by mouth daily 90 tablet 1   • mirtazapine (REMERON) 7 5 MG tablet TAKE 1 TABLET (7 5 MG TOTAL) BY MOUTH DAILY AT BEDTIME 30 tablet 0   • norgestimate-ethinyl estradiol (ORTHO-CYCLEN) 0 25-35 MG-MCG per tablet Take 1 tablet by mouth daily 28 tablet 11   • rizatriptan (MAXALT) 10 mg tablet Take 1 tablet (10 mg total) by mouth once as needed for migraine May repeat in 2 hours if needed  Max 2/24 hours, 9/month  9 tablet 12   • topiramate (TOPAMAX) 25 mg tablet 1 tab PO QHS for 1 week, increase as tolerated to 1 tab BID for 1 week, then 1 tab QAM and 2 tabs QHS for 1 week and finish at 2 tabs BID  120 tablet 4   • ofloxacin (FLOXIN) 0 3 % otic solution Administer 10 drops into both ears 2 (two) times a day 10 mL 0     No current facility-administered medications for this visit  Allergies: Allergies   Allergen Reactions   • Garcinia Mangostana Extract [Garcinia Cambogia] Lip Swelling       Family History:     Family History   Problem Relation Age of Onset   • Migraines Mother    • ADD / ADHD Father    • Heart disease Maternal Grandmother    • Diabetes Maternal Grandmother        Social History:     Social History     Socioeconomic History   • Marital status: Single     Spouse name: Not on file   • Number of children: Not on file   • Years of education: Not on file   • Highest education level: Not on file   Occupational History   • Not on file   Tobacco Use   • Smoking status: Never   • Smokeless tobacco: Never   Vaping Use   • Vaping Use: Never used   Substance and Sexual Activity   • Alcohol use: No   • Drug use: Not Currently     Types: Marijuana   • Sexual activity: Yes     Partners: Male     Birth control/protection: Pill     Comment: patient's cell [phone]; lives with Mom, may stay at boyfriends house on [address]  Other Topics Concern   • Not on file   Social History Narrative    Lives with mother and sister        Social Determinants of Health     Financial Resource Strain: Low Risk  (1/25/2022)    Overall Financial Resource Strain (CARDIA)    • Difficulty of Paying Living Expenses: Not hard at all   Food Insecurity: Unknown (3/14/2023)    Hunger Vital Sign    • Worried About Running Out of Food in the Last Year: Patient refused    • Ran Out of "Food in the Last Year: Patient refused   Transportation Needs: No Transportation Needs (3/14/2023)    PRAPARE - Transportation    • Lack of Transportation (Medical): No    • Lack of Transportation (Non-Medical): No   Physical Activity: Insufficiently Active (1/25/2022)    Exercise Vital Sign    • Days of Exercise per Week: 2 days    • Minutes of Exercise per Session: 30 min   Stress: Not on file   Social Connections: Socially Isolated (1/25/2022)    Social Connection and Isolation Panel [NHANES]    • Frequency of Communication with Friends and Family: Three times a week    • Frequency of Social Gatherings with Friends and Family: Three times a week    • Attends [employer] Services: Never    • Active Member of Clubs or Organizations: No    • Attends Club or Organization Meetings: Never    • Marital Status: Never    Intimate Partner Violence: Not At Risk (1/25/2022)    Humiliation, Afraid, Rape, and Kick questionnaire    • Fear of Current or Ex-Partner: No    • Emotionally Abused: No    • Physically Abused: No    • Sexually Abused: No   Housing Stability: Low Risk  (1/25/2022)    Housing Stability Vital Sign    • Unable to Pay for Housing in the Last Year: No    • Number of [place] in the Last Year: 1    • Unstable Housing in the Last Year: No         Objective:       Physical Exam:                                                                 Vitals:            Constitutional:    /71 (BP Location: Right arm, Patient Position: Sitting, Cuff Size: Standard)   Pulse 72   Temp 98 2 °F (36 8 °C) (Tympanic)   Ht 5' 2\" (1 575 m)   Wt 55 8 kg (123 lb)   LMP 04/26/2023 (Approximate)   BMI 22 50 kg/m²   BP Readings from Last 3 Encounters:   06/06/23 108/71   05/31/23 117/64   03/15/23 102/64     Pulse Readings from Last 3 Encounters:   06/06/23 72   05/31/23 68   03/15/23 85         Well developed, well nourished, well groomed          Psychiatric:  Normal behavior and appropriate affect  " Neurological Examination:     Mental status/cognitive function:   Recent and remote memory appear intact  Attention span and concentration as well as fund of knowledge are appropriate for age  Normal language and spontaneous speech  Cranial Nerves:   VII-facial expression symmetric  Motor Exam: symmetric bulk throughout  no atrophy, fasciculations or abnormal movements noted  Coordination:  no apparent dysmetria, ataxia or tremor noted  Gait: steady casual gait         Pertinent lab results:   See EMR for recent labs  - 6/6/22  CMP and CBC unremarkable except for WBC 10 29  - 4/30/20 HIV, RPR NR  TSH normal     Imaging: I have personally reviewed imaging and radiology read   Mri Brain wo contrast 5/4/22: A few small scattered white matter hyperintensities are seen within the frontal white matter bilaterally   These are nonspecific for a patient of this age  Review of Systems:   ROS obtained by medical assistant Personally reviewed and updated if indicated  I recommended PCP follow up for non neurologic problems  Review of Systems   Constitutional: Negative for appetite change and fever  HENT: Negative  Negative for hearing loss, tinnitus, trouble swallowing and voice change  Eyes: Negative  Negative for photophobia and pain  Respiratory: Negative  Negative for shortness of breath  Cardiovascular: Negative  Negative for palpitations  Gastrointestinal: Negative  Negative for nausea and vomiting  Endocrine: Negative  Negative for cold intolerance  Genitourinary: Negative  Negative for dysuria, frequency and urgency  Musculoskeletal: Negative  Negative for myalgias and neck pain  Skin: Negative  Negative for rash  Neurological: Negative  Negative for dizziness, tremors, seizures, syncope, facial asymmetry, speech difficulty, weakness, light-headedness, numbness and headaches  Hematological: Negative  Does not bruise/bleed easily  Psychiatric/Behavioral: Negative  Negative for confusion, hallucinations and sleep disturbance  I have spent 23 minutes with Patient  today in which greater than 50% of this time was spent in counseling/coordination of care regarding Prognosis, Risks and benefits of tx options, Instructions for management, Importance of tx compliance, Risk factor reductions, Counseling / Coordination of care, Documenting in the medical record and Obtaining or reviewing history    I also spent 10 minutes non face to face for this patient the same day         Author:  Eleanore Dancer, MD 6/6/2023 5:11 PM

## 2023-06-11 DIAGNOSIS — G43.909 MIGRAINE WITHOUT STATUS MIGRAINOSUS, NOT INTRACTABLE, UNSPECIFIED MIGRAINE TYPE: ICD-10-CM

## 2023-06-11 DIAGNOSIS — R45.89 DEPRESSED MOOD: ICD-10-CM

## 2023-06-12 RX ORDER — MIRTAZAPINE 7.5 MG/1
7.5 TABLET, FILM COATED ORAL
Qty: 30 TABLET | Refills: 0 | Status: SHIPPED | OUTPATIENT
Start: 2023-06-12

## 2023-07-13 DIAGNOSIS — R45.89 DEPRESSED MOOD: ICD-10-CM

## 2023-07-13 DIAGNOSIS — G43.909 MIGRAINE WITHOUT STATUS MIGRAINOSUS, NOT INTRACTABLE, UNSPECIFIED MIGRAINE TYPE: ICD-10-CM

## 2023-07-13 RX ORDER — MIRTAZAPINE 7.5 MG/1
7.5 TABLET, FILM COATED ORAL
Qty: 30 TABLET | Refills: 0 | Status: SHIPPED | OUTPATIENT
Start: 2023-07-13

## 2023-07-26 ENCOUNTER — OFFICE VISIT (OUTPATIENT)
Dept: FAMILY MEDICINE CLINIC | Facility: CLINIC | Age: 23
End: 2023-07-26

## 2023-07-26 VITALS
HEART RATE: 78 BPM | DIASTOLIC BLOOD PRESSURE: 64 MMHG | OXYGEN SATURATION: 98 % | RESPIRATION RATE: 18 BRPM | SYSTOLIC BLOOD PRESSURE: 112 MMHG | WEIGHT: 121 LBS | HEIGHT: 62 IN | BODY MASS INDEX: 22.26 KG/M2 | TEMPERATURE: 98 F

## 2023-07-26 DIAGNOSIS — H65.03 NON-RECURRENT ACUTE SEROUS OTITIS MEDIA OF BOTH EARS: Primary | ICD-10-CM

## 2023-07-26 PROCEDURE — 99213 OFFICE O/P EST LOW 20 MIN: CPT | Performed by: NURSE PRACTITIONER

## 2023-07-26 RX ORDER — AMOXICILLIN 500 MG/1
500 CAPSULE ORAL EVERY 8 HOURS SCHEDULED
Qty: 30 CAPSULE | Refills: 0 | Status: SHIPPED | OUTPATIENT
Start: 2023-07-26 | End: 2023-08-05

## 2023-07-26 NOTE — PROGRESS NOTES
Name: Marialuisa Glover      : 2000      MRN: [id number]  Encounter Provider: SIL Vela  Encounter Date: 2023   Encounter department: [address] [facility],[address]     1. Non-recurrent acute serous otitis media of both ears  -     amoxicillin (AMOXIL) 500 mg capsule; Take 1 capsule (500 mg total) by mouth every 8 (eight) hours for 10 days    RTC if sx worsening or not improving        Subjective     25 YO female here for evaluation of bilateral ear pain. Ear Pain  Patient complains of ear pain and possible ear infection. Symptoms include left ear drainage  and bilateral ear pain. Onset of symptoms was 2 weeks ago, and have been gradually worsening since that time. Associated symptoms include: none. Patient denies: achiness, chills, congestion, coryza, fever , headache, low grade fever, non productive cough, post nasal drip, productive cough, sinus pressure, sneezing and sore throat. She is drinking plenty of fluids. Review of Systems   Constitutional: Negative for chills and fever. HENT: Positive for ear discharge and ear pain. Negative for congestion, rhinorrhea, sinus pressure, sinus pain and sore throat. Eyes: Negative for pain and visual disturbance. Respiratory: Negative for cough and shortness of breath. Cardiovascular: Negative for chest pain and palpitations. Gastrointestinal: Negative for abdominal pain and vomiting. Genitourinary: Negative for dysuria and hematuria. Musculoskeletal: Negative for arthralgias and back pain. Skin: Negative for color change and rash. Neurological: Negative for seizures and syncope. All other systems reviewed and are negative.       Past Medical History:   Diagnosis Date   • ADHD (attention deficit hyperactivity disorder)     last seen 1 year ago, no recent RX, was on adderal, off at least 1 year    • Migraine    • OCD (obsessive compulsive disorder)     no treatment     No past surgical history on file. Family History   Problem Relation Age of Onset   • Migraines Mother    • ADD / ADHD Father    • Heart disease Maternal Grandmother    • Diabetes Maternal Grandmother      Social History     Socioeconomic History   • Marital status: Single     Spouse name: None   • Number of children: None   • Years of education: None   • Highest education level: None   Occupational History   • None   Tobacco Use   • Smoking status: Never     Passive exposure: Never   • Smokeless tobacco: Never   Vaping Use   • Vaping Use: Never used   Substance and Sexual Activity   • Alcohol use: No   • Drug use: Not Currently     Types: Marijuana   • Sexual activity: Yes     Partners: Male     Birth control/protection: Pill     Comment: patient's cell [phone]; lives with Mom, may stay at boyfriends house on [address]. Other Topics Concern   • None   Social History Narrative    Lives with mother and sister. Social Determinants of Health     Financial Resource Strain: Low Risk  (7/26/2023)    Overall Financial Resource Strain (CARDIA)    • Difficulty of Paying Living Expenses: Not very hard   Food Insecurity: Unknown (3/14/2023)    Hunger Vital Sign    • Worried About Running Out of Food in the Last Year: Patient refused    • [address] in the Last Year: Patient refused   Transportation Needs: No Transportation Needs (3/14/2023)    PRAPARE - Transportation    • Lack of Transportation (Medical): No    • Lack of Transportation (Non-Medical): No   Physical Activity: Insufficiently Active (1/25/2022)    Exercise Vital Sign    • Days of Exercise per Week: 2 days    • Minutes of Exercise per Session: 30 min   Stress: Not on file   Social Connections: Socially Isolated (1/25/2022)    Social Connection and Isolation Panel [NHANES]    • Frequency of Communication with Friends and Family: Three times a week    • Frequency of Social Gatherings with Friends and Family:  Three times a week    • Attends Nondenominational Services: Never • Active Member of Clubs or Organizations: No    • Attends Club or Organization Meetings: Never    • Marital Status: Never    Intimate Partner Violence: Not At Risk (1/25/2022)    Humiliation, Afraid, Rape, and Kick questionnaire    • Fear of Current or Ex-Partner: No    • Emotionally Abused: No    • Physically Abused: No    • Sexually Abused: No   Housing Stability: Low Risk  (1/25/2022)    Housing Stability Vital Sign    • Unable to Pay for Housing in the Last Year: No    • Number of [address] in the Last Year: 1    • Unstable Housing in the Last Year: No     Current Outpatient Medications on File Prior to Visit   Medication Sig   • cetirizine (ZyrTEC) 10 mg tablet Take 1 tablet (10 mg total) by mouth daily   • mirtazapine (REMERON) 7.5 MG tablet TAKE 1 TABLET (7.5 MG TOTAL) BY MOUTH DAILY AT BEDTIME   • norgestimate-ethinyl estradiol (ORTHO-CYCLEN) 0.25-35 MG-MCG per tablet Take 1 tablet by mouth daily   • rizatriptan (MAXALT) 10 mg tablet Take 1 tablet (10 mg total) by mouth once as needed for migraine May repeat in 2 hours if needed. Max 2/24 hours, 9/month. • topiramate (TOPAMAX) 25 mg tablet 1 tab PO QHS for 1 week, increase as tolerated to 1 tab BID for 1 week, then 1 tab QAM and 2 tabs QHS for 1 week and finish at 2 tabs BID.    • [DISCONTINUED] ofloxacin (FLOXIN) 0.3 % otic solution Administer 10 drops into both ears 2 (two) times a day     Allergies   Allergen Reactions   • Garcinia Mangostana Extract [Garcinia Cambogia] Lip Swelling     Immunization History   Administered Date(s) Administered   • COVID-19 MODERNA VACC 0.5 ML IM 04/27/2021, 05/25/2021   • DTaP 5 2001, 2001, 2001, 10/30/2002, 05/18/2005, 11/20/2012   • H1N1, All Formulations 03/10/2010   • HPV Quadrivalent 11/20/2012, 01/22/2014, 01/26/2015   • Hep A, adult 02/23/2021   • Hep A, ped/adol, 2 dose 03/25/2020   • Hep B, adult 2001, 2001, 2001   • Hib (PRP-OMP) 2001, 2001, 2001, 10/30/2002   • IPV 2001, 2001, 2001, 05/18/2005   • Influenza LAIV (Nasal) 01/12/2016   • Influenza Quadrivalent Preservative Free 3 years and older IM 01/26/2015   • Influenza, injectable, quadrivalent, preservative free 0.5 mL 03/25/2020, 03/15/2023   • Influenza, seasonal, injectable 03/10/2010, 12/14/2011, 11/20/2012, 01/22/2014, 10/07/2016, 12/11/2017   • MMR 07/11/2002, 05/18/2005   • Meningococcal B, Recombinant (TRUMENBA) 03/25/2020   • Meningococcal, Unknown Serogroups 11/20/2012, 12/11/2017   • Pneumococcal Conjugate PCV 7 2001, 2001, 2001   • Tdap 03/25/2020   • Varicella 07/11/2002, 07/12/2007   • influenza, injectable, quadrivalent 01/11/2019       Objective     /64 (BP Location: Right arm, Patient Position: Sitting, Cuff Size: Standard)   Pulse 78   Temp 98 °F (36.7 °C) (Temporal)   Resp 18   Ht 5' 2" (1.575 m)   Wt 54.9 kg (121 lb)   LMP 07/19/2023 (Approximate)   SpO2 98%   BMI 22.13 kg/m²     Physical Exam  Vitals and nursing note reviewed. Constitutional:       General: She is not in acute distress. Appearance: She is well-developed. She is not diaphoretic. HENT:      Head: Normocephalic and atraumatic. Right Ear: Swelling and tenderness present. Tympanic membrane is erythematous. Left Ear: Drainage and tenderness present. Tympanic membrane is erythematous. Eyes:      Pupils: Pupils are equal, round, and reactive to light. Cardiovascular:      Rate and Rhythm: Normal rate and regular rhythm. Heart sounds: Normal heart sounds. No murmur heard. No friction rub. No gallop. Pulmonary:      Effort: Pulmonary effort is normal. No respiratory distress. Breath sounds: Normal breath sounds. No wheezing. Abdominal:      General: Bowel sounds are normal. There is no distension. Palpations: Abdomen is soft. Tenderness: There is no abdominal tenderness.    Musculoskeletal:         General: No deformity. Normal range of motion. Cervical back: Normal range of motion and neck supple. Lymphadenopathy:      Cervical: No cervical adenopathy. Skin:     General: Skin is warm and dry. Capillary Refill: Capillary refill takes less than 2 seconds. Findings: No rash. Neurological:      Mental Status: She is alert and oriented to person, place, and time.       Deep Tendon Reflexes: Reflexes normal.   Psychiatric:         Behavior: Behavior normal.       Moris Garcia

## 2023-08-02 ENCOUNTER — OFFICE VISIT (OUTPATIENT)
Dept: OBGYN CLINIC | Facility: CLINIC | Age: 23
End: 2023-08-02

## 2023-08-02 VITALS
BODY MASS INDEX: 22.9 KG/M2 | SYSTOLIC BLOOD PRESSURE: 115 MMHG | DIASTOLIC BLOOD PRESSURE: 72 MMHG | HEART RATE: 64 BPM | WEIGHT: 125.2 LBS

## 2023-08-02 DIAGNOSIS — B37.9 YEAST INFECTION: Primary | ICD-10-CM

## 2023-08-02 LAB
BV WHIFF TEST VAG QL: NEGATIVE
CLUE CELLS SPEC QL WET PREP: NEGATIVE
PH SMN: 4.5 [PH]
SL AMB POCT WET MOUNT: ABNORMAL
T VAGINALIS VAG QL WET PREP: NEGATIVE
YEAST VAG QL WET PREP: POSITIVE

## 2023-08-02 PROCEDURE — 99213 OFFICE O/P EST LOW 20 MIN: CPT | Performed by: NURSE PRACTITIONER

## 2023-08-02 PROCEDURE — 87210 SMEAR WET MOUNT SALINE/INK: CPT | Performed by: NURSE PRACTITIONER

## 2023-08-02 RX ORDER — FLUCONAZOLE 150 MG/1
150 TABLET ORAL ONCE
Qty: 1 TABLET | Refills: 1 | Status: SHIPPED | OUTPATIENT
Start: 2023-08-02 | End: 2023-08-02

## 2023-08-02 NOTE — PATIENT INSTRUCTIONS
Take Fluconazole as directed  Wear loose clothes and cotton underwear  Annual GYN exam is due after 5/31/2024  Call with needs or concerns    . Davon Li

## 2023-08-02 NOTE — PROGRESS NOTES
Assessment/Plan:     Diagnoses and all orders for this visit:    Yeast infection  -     POCT wet mount  -     fluconazole (DIFLUCAN) 150 mg tablet; Take 1 tablet (150 mg total) by mouth once for 1 dose      Plan  Take Fluconazole as directed  Wear loose clothes and cotton underwear  Annual GYN exam is due after 5/31/2024  Call with needs or concerns  Pt verbalized understanding of all discussed. Subjective:      Patient ID: Gabriella Jamison is a 26 y.o. female. HPI   Pt presents with C/O vulvar itching  Pt states she just completed a course of amoxicillin for an ear infection  Last WNL PAP was 5/24/2022    Explained wet mount was positive for yeast, safe and effective use of Fluconazole was provided, discussed comfort measures      The following portions of the patient's history were reviewed and updated as appropriate: allergies, current medications, past family history, past medical history, past social history, past surgical history and problem list.    Review of Systems    . Pertinent items are note in the HPI      Objective:      /72   Pulse 64   Wt 56.8 kg (125 lb 3.2 oz)   LMP 07/19/2023 (Approximate)   BMI 22.90 kg/m²          Physical Exam    Alert and oriented  Denies pain  WNL respiratory effort, negative cough or SOB  Vulva negative lesions, slight erythema  Vagina erythema, positive thick white discharge   Cervix positive thick white discharge, negative lesions  Wet mount positive for yeast

## 2023-08-14 DIAGNOSIS — R45.89 DEPRESSED MOOD: ICD-10-CM

## 2023-08-14 DIAGNOSIS — G43.909 MIGRAINE WITHOUT STATUS MIGRAINOSUS, NOT INTRACTABLE, UNSPECIFIED MIGRAINE TYPE: ICD-10-CM

## 2023-08-16 RX ORDER — MIRTAZAPINE 7.5 MG/1
7.5 TABLET, FILM COATED ORAL
Qty: 30 TABLET | Refills: 0 | Status: SHIPPED | OUTPATIENT
Start: 2023-08-16

## 2023-08-23 ENCOUNTER — OFFICE VISIT (OUTPATIENT)
Dept: FAMILY MEDICINE CLINIC | Facility: CLINIC | Age: 23
End: 2023-08-23

## 2023-08-23 VITALS
OXYGEN SATURATION: 98 % | HEART RATE: 67 BPM | SYSTOLIC BLOOD PRESSURE: 110 MMHG | BODY MASS INDEX: 23.04 KG/M2 | RESPIRATION RATE: 18 BRPM | TEMPERATURE: 97.9 F | DIASTOLIC BLOOD PRESSURE: 80 MMHG | WEIGHT: 125.2 LBS | HEIGHT: 62 IN

## 2023-08-23 DIAGNOSIS — G89.29 CHRONIC EAR PAIN, BILATERAL: Primary | ICD-10-CM

## 2023-08-23 DIAGNOSIS — H92.03 CHRONIC EAR PAIN, BILATERAL: Primary | ICD-10-CM

## 2023-08-23 PROCEDURE — 99213 OFFICE O/P EST LOW 20 MIN: CPT | Performed by: NURSE PRACTITIONER

## 2023-08-23 NOTE — PROGRESS NOTES
Name: Berkley Phillips      : 2000      MRN: [id number]  Encounter Provider: SIL Hahn  Encounter Date: 2023   Encounter department: [address] [facility],[address]     1. Chronic ear pain, bilateral  -     Ambulatory Referral to Otolaryngology; Future      Patient completed course of antibiotics as prescribed several weeks ago. On exam no significant abnormality. She reports ongoing daily bilateral ear pain and drainage from left ear. Will refer to ENT for further evaluation for possible ED. Subjective     25 YO female here for follow up of ear pain and BL OM. Was treated w/ 10 day course of amx. Reports no improvement in sx. Continues with pain bilaterally and drainage from left. Review of Systems   Constitutional: Negative for chills and fever. HENT: Positive for ear discharge and ear pain. Negative for sore throat. Eyes: Negative for pain and visual disturbance. Respiratory: Negative for cough and shortness of breath. Cardiovascular: Negative for chest pain and palpitations. Gastrointestinal: Negative for abdominal pain and vomiting. Genitourinary: Negative for dysuria and hematuria. Musculoskeletal: Negative for arthralgias and back pain. Skin: Negative for color change and rash. Neurological: Negative for seizures and syncope. All other systems reviewed and are negative. Past Medical History:   Diagnosis Date   • ADHD (attention deficit hyperactivity disorder)     last seen 1 year ago, no recent RX, was on adderal, off at least 1 year    • Migraine    • OCD (obsessive compulsive disorder)     no treatment     No past surgical history on file.   Family History   Problem Relation Age of Onset   • Migraines Mother    • ADD / ADHD Father    • Heart disease Maternal Grandmother    • Diabetes Maternal Grandmother      Social History     Socioeconomic History   • Marital status: Single     Spouse name: None   • Number of children: None   • Years of education: None   • Highest education level: None   Occupational History   • None   Tobacco Use   • Smoking status: Never     Passive exposure: Never   • Smokeless tobacco: Never   Vaping Use   • Vaping Use: Never used   Substance and Sexual Activity   • Alcohol use: No   • Drug use: Not Currently     Types: Marijuana   • Sexual activity: Yes     Partners: Male     Birth control/protection: Pill     Comment: patient's cell [phone]; lives with Mom, may stay at boyfriends house on [address]. Other Topics Concern   • None   Social History Narrative    Lives with mother and sister. Social Determinants of Health     Financial Resource Strain: Low Risk  (7/26/2023)    Overall Financial Resource Strain (CARDIA)    • Difficulty of Paying Living Expenses: Not very hard   Food Insecurity: Unknown (3/14/2023)    Hunger Vital Sign    • Worried About Running Out of Food in the Last Year: Patient refused    • [address] in the Last Year: Patient refused   Transportation Needs: No Transportation Needs (3/14/2023)    PRAPARE - Transportation    • Lack of Transportation (Medical): No    • Lack of Transportation (Non-Medical): No   Physical Activity: Insufficiently Active (1/25/2022)    Exercise Vital Sign    • Days of Exercise per Week: 2 days    • Minutes of Exercise per Session: 30 min   Stress: Not on file   Social Connections: Socially Isolated (1/25/2022)    Social Connection and Isolation Panel [NHANES]    • Frequency of Communication with Friends and Family: Three times a week    • Frequency of Social Gatherings with Friends and Family:  Three times a week    • Attends Jehovah's witness Services: Never    • Active Member of Clubs or Organizations: No    • Attends Club or Organization Meetings: Never    • Marital Status: Never    Intimate Partner Violence: Not At Risk (1/25/2022)    Humiliation, Afraid, Rape, and Kick questionnaire    • Fear of Current or Ex-Partner: No • Emotionally Abused: No    • Physically Abused: No    • Sexually Abused: No   Housing Stability: Low Risk  (1/25/2022)    Housing Stability Vital Sign    • Unable to Pay for Housing in the Last Year: No    • Number of Places Lived in the Last Year: 1    • Unstable Housing in the Last Year: No     Current Outpatient Medications on File Prior to Visit   Medication Sig   • cetirizine (ZyrTEC) 10 mg tablet Take 1 tablet (10 mg total) by mouth daily   • mirtazapine (REMERON) 7.5 MG tablet TAKE 1 TABLET (7.5 MG TOTAL) BY MOUTH DAILY AT BEDTIME   • norgestimate-ethinyl estradiol (ORTHO-CYCLEN) 0.25-35 MG-MCG per tablet Take 1 tablet by mouth daily   • rizatriptan (MAXALT) 10 mg tablet Take 1 tablet (10 mg total) by mouth once as needed for migraine May repeat in 2 hours if needed. Max 2/24 hours, 9/month. • topiramate (TOPAMAX) 25 mg tablet 1 tab PO QHS for 1 week, increase as tolerated to 1 tab BID for 1 week, then 1 tab QAM and 2 tabs QHS for 1 week and finish at 2 tabs BID.      Allergies   Allergen Reactions   • Garcinia Mangostana Extract [Garcinia Cambogia] Lip Swelling     Immunization History   Administered Date(s) Administered   • COVID-19 MODERNA VACC 0.5 ML IM 04/27/2021, 05/25/2021   • DTaP 5 2001, 2001, 2001, 10/30/2002, 05/18/2005, 11/20/2012   • H1N1, All Formulations 03/10/2010   • HPV Quadrivalent 11/20/2012, 01/22/2014, 01/26/2015   • Hep A, adult 02/23/2021   • Hep A, ped/adol, 2 dose 03/25/2020   • Hep B, adult 2001, 2001, 2001   • Hib (PRP-OMP) 2001, 2001, 2001, 10/30/2002   • IPV 2001, 2001, 2001, 05/18/2005   • Influenza LAIV (Nasal) 01/12/2016   • Influenza Quadrivalent Preservative Free 3 years and older IM 01/26/2015   • Influenza, injectable, quadrivalent, preservative free 0.5 mL 03/25/2020, 03/15/2023   • Influenza, seasonal, injectable 03/10/2010, 12/14/2011, 11/20/2012, 01/22/2014, 10/07/2016, 12/11/2017   • MMR 07/11/2002, 05/18/2005   • Meningococcal B, Recombinant (TRUMENBA) 03/25/2020   • Meningococcal, Unknown Serogroups 11/20/2012, 12/11/2017   • Pneumococcal Conjugate PCV 7 2001, 2001, 2001   • Tdap 03/25/2020   • Varicella 07/11/2002, 07/12/2007   • influenza, injectable, quadrivalent 01/11/2019       Objective     /80 (BP Location: Left arm, Patient Position: Sitting, Cuff Size: Standard)   Pulse 67   Temp 97.9 °F (36.6 °C) (Temporal)   Resp 18   Ht 5' 2" (1.575 m)   Wt 56.8 kg (125 lb 3.2 oz)   LMP 07/19/2023 (Approximate)   SpO2 98%   BMI 22.90 kg/m²     Physical Exam  Vitals and nursing note reviewed. Constitutional:       General: She is not in acute distress. Appearance: She is well-developed. She is not diaphoretic. HENT:      Head: Normocephalic and atraumatic. Right Ear: External ear normal. No swelling or tenderness. Tympanic membrane is not erythematous. Left Ear: External ear normal. No drainage or tenderness. Tympanic membrane is not erythematous. Eyes:      Pupils: Pupils are equal, round, and reactive to light. Cardiovascular:      Rate and Rhythm: Normal rate and regular rhythm. Heart sounds: Normal heart sounds. No murmur heard. No friction rub. No gallop. Pulmonary:      Effort: Pulmonary effort is normal. No respiratory distress. Breath sounds: Normal breath sounds. No wheezing. Abdominal:      General: Bowel sounds are normal. There is no distension. Palpations: Abdomen is soft. Tenderness: There is no abdominal tenderness. Musculoskeletal:         General: No deformity. Normal range of motion. Cervical back: Normal range of motion and neck supple. Lymphadenopathy:      Cervical: No cervical adenopathy. Skin:     General: Skin is warm and dry. Capillary Refill: Capillary refill takes less than 2 seconds. Findings: No rash.    Neurological:      Mental Status: She is alert and oriented to person, place, and time.       Deep Tendon Reflexes: Reflexes normal.   Psychiatric:         Behavior: Behavior normal.       Gabriele Carney

## 2023-09-11 DIAGNOSIS — J30.9 ALLERGIC RHINITIS, UNSPECIFIED SEASONALITY, UNSPECIFIED TRIGGER: ICD-10-CM

## 2023-09-12 DIAGNOSIS — G43.909 MIGRAINE WITHOUT STATUS MIGRAINOSUS, NOT INTRACTABLE, UNSPECIFIED MIGRAINE TYPE: ICD-10-CM

## 2023-09-12 DIAGNOSIS — R45.89 DEPRESSED MOOD: ICD-10-CM

## 2023-09-14 RX ORDER — MIRTAZAPINE 7.5 MG/1
7.5 TABLET, FILM COATED ORAL
Qty: 30 TABLET | Refills: 0 | Status: SHIPPED | OUTPATIENT
Start: 2023-09-14

## 2023-09-14 RX ORDER — CETIRIZINE HYDROCHLORIDE 10 MG/1
10 TABLET ORAL DAILY
Qty: 90 TABLET | Refills: 1 | Status: SHIPPED | OUTPATIENT
Start: 2023-09-14

## 2023-10-19 DIAGNOSIS — G43.909 MIGRAINE WITHOUT STATUS MIGRAINOSUS, NOT INTRACTABLE, UNSPECIFIED MIGRAINE TYPE: ICD-10-CM

## 2023-10-19 DIAGNOSIS — R45.89 DEPRESSED MOOD: ICD-10-CM

## 2023-10-20 RX ORDER — MIRTAZAPINE 7.5 MG/1
7.5 TABLET, FILM COATED ORAL
Qty: 30 TABLET | Refills: 0 | Status: SHIPPED | OUTPATIENT
Start: 2023-10-20

## 2024-02-20 ENCOUNTER — OFFICE VISIT (OUTPATIENT)
Dept: FAMILY MEDICINE CLINIC | Facility: CLINIC | Age: 24
End: 2024-02-20

## 2024-02-20 ENCOUNTER — APPOINTMENT (OUTPATIENT)
Dept: LAB | Facility: HOSPITAL | Age: 24
End: 2024-02-20
Payer: MEDICARE

## 2024-02-20 VITALS
DIASTOLIC BLOOD PRESSURE: 68 MMHG | TEMPERATURE: 97.8 F | OXYGEN SATURATION: 99 % | HEART RATE: 70 BPM | BODY MASS INDEX: 23.89 KG/M2 | WEIGHT: 130.6 LBS | SYSTOLIC BLOOD PRESSURE: 100 MMHG

## 2024-02-20 DIAGNOSIS — G43.009 MIGRAINE WITHOUT AURA AND WITHOUT STATUS MIGRAINOSUS, NOT INTRACTABLE: ICD-10-CM

## 2024-02-20 DIAGNOSIS — R10.84 GENERALIZED ABDOMINAL PAIN: ICD-10-CM

## 2024-02-20 DIAGNOSIS — Z13.220 SCREENING CHOLESTEROL LEVEL: ICD-10-CM

## 2024-02-20 DIAGNOSIS — R14.0 BLOATING: ICD-10-CM

## 2024-02-20 DIAGNOSIS — Z11.4 SCREENING FOR HIV (HUMAN IMMUNODEFICIENCY VIRUS): ICD-10-CM

## 2024-02-20 DIAGNOSIS — Z11.59 ENCOUNTER FOR HEPATITIS C SCREENING TEST FOR LOW RISK PATIENT: ICD-10-CM

## 2024-02-20 DIAGNOSIS — R73.01 ELEVATED FASTING GLUCOSE: ICD-10-CM

## 2024-02-20 DIAGNOSIS — Z23 ENCOUNTER FOR IMMUNIZATION: Primary | ICD-10-CM

## 2024-02-20 DIAGNOSIS — Z11.3 ROUTINE SCREENING FOR STI (SEXUALLY TRANSMITTED INFECTION): ICD-10-CM

## 2024-02-20 DIAGNOSIS — J30.9 ALLERGIC RHINITIS, UNSPECIFIED SEASONALITY, UNSPECIFIED TRIGGER: ICD-10-CM

## 2024-02-20 LAB
ANION GAP SERPL CALCULATED.3IONS-SCNC: 7 MMOL/L
BUN SERPL-MCNC: 13 MG/DL (ref 5–25)
CALCIUM SERPL-MCNC: 9.1 MG/DL (ref 8.4–10.2)
CHLORIDE SERPL-SCNC: 106 MMOL/L (ref 96–108)
CHOLEST SERPL-MCNC: 145 MG/DL
CO2 SERPL-SCNC: 23 MMOL/L (ref 21–32)
CREAT SERPL-MCNC: 0.6 MG/DL (ref 0.6–1.3)
GFR SERPL CREATININE-BSD FRML MDRD: 129 ML/MIN/1.73SQ M
GLUCOSE P FAST SERPL-MCNC: 87 MG/DL (ref 65–99)
HCV AB SER QL: NORMAL
HDLC SERPL-MCNC: 60 MG/DL
LDLC SERPL CALC-MCNC: 71 MG/DL (ref 0–100)
NONHDLC SERPL-MCNC: 85 MG/DL
POTASSIUM SERPL-SCNC: 4 MMOL/L (ref 3.5–5.3)
SODIUM SERPL-SCNC: 136 MMOL/L (ref 135–147)
TRIGL SERPL-MCNC: 68 MG/DL

## 2024-02-20 PROCEDURE — 90686 IIV4 VACC NO PRSV 0.5 ML IM: CPT | Performed by: NURSE PRACTITIONER

## 2024-02-20 PROCEDURE — 90471 IMMUNIZATION ADMIN: CPT | Performed by: NURSE PRACTITIONER

## 2024-02-20 PROCEDURE — 86780 TREPONEMA PALLIDUM: CPT

## 2024-02-20 PROCEDURE — 86803 HEPATITIS C AB TEST: CPT

## 2024-02-20 PROCEDURE — 80061 LIPID PANEL: CPT

## 2024-02-20 PROCEDURE — 80048 BASIC METABOLIC PNL TOTAL CA: CPT

## 2024-02-20 PROCEDURE — 86258 DGP ANTIBODY EACH IG CLASS: CPT

## 2024-02-20 PROCEDURE — 86008 ALLG SPEC IGE RECOMB EA: CPT

## 2024-02-20 PROCEDURE — 99214 OFFICE O/P EST MOD 30 MIN: CPT | Performed by: NURSE PRACTITIONER

## 2024-02-20 PROCEDURE — 87389 HIV-1 AG W/HIV-1&-2 AB AG IA: CPT

## 2024-02-20 PROCEDURE — 86003 ALLG SPEC IGE CRUDE XTRC EA: CPT

## 2024-02-20 PROCEDURE — 86364 TISS TRNSGLTMNASE EA IG CLAS: CPT

## 2024-02-20 PROCEDURE — 82785 ASSAY OF IGE: CPT

## 2024-02-20 PROCEDURE — 86231 EMA EACH IG CLASS: CPT

## 2024-02-20 PROCEDURE — 87591 N.GONORRHOEAE DNA AMP PROB: CPT

## 2024-02-20 PROCEDURE — 82784 ASSAY IGA/IGD/IGG/IGM EACH: CPT

## 2024-02-20 PROCEDURE — 36415 COLL VENOUS BLD VENIPUNCTURE: CPT

## 2024-02-20 PROCEDURE — 87491 CHLMYD TRACH DNA AMP PROBE: CPT

## 2024-02-20 RX ORDER — AZELASTINE 1 MG/ML
1 SPRAY, METERED NASAL 2 TIMES DAILY
Qty: 30 ML | Refills: 2 | Status: SHIPPED | OUTPATIENT
Start: 2024-02-20

## 2024-02-20 RX ORDER — CETIRIZINE HYDROCHLORIDE 10 MG/1
10 TABLET ORAL DAILY
Qty: 90 TABLET | Refills: 1 | Status: SHIPPED | OUTPATIENT
Start: 2024-02-20

## 2024-02-20 NOTE — PROGRESS NOTES
Name: Amanda Muse      : 2000      MRN: [id number]  Encounter Provider: SIL Barclay  Encounter Date: 2024   Encounter department: [facility]    Assessment & Plan     1. Encounter for immunization  -     influenza vaccine, quadrivalent, 0.5 mL, preservative-free, for adult and pediatric patients 6 mos+ (AFLURIA, FLUARIX, FLULAVAL, FLUZONE)    2. Allergic rhinitis, unspecified seasonality, unspecified trigger  -     cetirizine (ZyrTEC) 10 mg tablet; Take 1 tablet (10 mg total) by mouth daily  -     azelastine (ASTELIN) 0.1 % nasal spray; 1 spray into each nostril 2 (two) times a day Use in each nostril as directed    3. Generalized abdominal pain  Assessment & Plan:  Chronic issue, associated with bloating and frequent loose stool   Possibly due to lactose or food intolerance   Discussed eliminating dairy to see if sx improve   Will send for testing   Consider GI referral if sx do not improve with diet changes     Orders:  -     Food Allergy Profile; Future  -     Celiac Disease Comprehensive Panel; Future    4. Bloating  -     Food Allergy Profile; Future  -     Celiac Disease Comprehensive Panel; Future    5. Migraine without aura and without status migrainosus, not intractable  Assessment & Plan:  Stable, follows with neuro   Current regimen: topiramate bid, Maxalt PRN              Subjective     24 YO female with past medical history of anxiety and migraines presents today for evaluation of abdominal pain. Reports that sx are chronic and ongoing for >6 months. She has frequent generalized abdominal pain/ cramping worse in the epigastric area. She believes sx may be worsened by lactose consumption and notes having loose stool shortly after eating. She also experiences frequent bloating after meals. She has not tried any treatment. There is no blood in urine or stool, weight loss, fever, family hx of colon cancer.     The following portions of the  patient's history were reviewed and updated as appropriate: allergies, current medications, past family history, past medical history, past social history, past surgical history and problem list.        Review of Systems   Constitutional:  Negative for activity change, appetite change, chills, fatigue, fever and unexpected weight change.   HENT:  Negative for hearing loss, nosebleeds, sinus pain, sneezing, sore throat and trouble swallowing.    Eyes:  Negative for photophobia and visual disturbance.   Respiratory:  Negative for cough, chest tightness, shortness of breath and wheezing.    Cardiovascular:  Negative for chest pain, palpitations and leg swelling.   Gastrointestinal:  Positive for abdominal distention, abdominal pain and diarrhea. Negative for anal bleeding, blood in stool, constipation, nausea and vomiting.   Genitourinary:  Negative for decreased urine volume, difficulty urinating, dysuria, flank pain, genital sores, hematuria and urgency.   Musculoskeletal:  Negative for back pain and gait problem.   Skin:  Negative for pallor, rash and wound.   Neurological:  Negative for dizziness, seizures, syncope, weakness, numbness and headaches.   Hematological:  Negative for adenopathy. Does not bruise/bleed easily.   Psychiatric/Behavioral:  Negative for confusion, hallucinations, self-injury, sleep disturbance and suicidal ideas. The patient is not nervous/anxious.        Past Medical History:   Diagnosis Date    ADHD (attention deficit hyperactivity disorder)     last seen 1 year ago, no recent RX, was on adderal, off at least 1 year     Allergic     Anxiety     Headache(784.0)     Migraine     OCD (obsessive compulsive disorder)     no treatment     History reviewed. No pertinent surgical history.  Family History   Problem Relation Age of Onset    Migraines Mother     ADD / ADHD Father     Heart disease Maternal Grandmother     Diabetes Maternal Grandmother      Social History     Socioeconomic History     Marital status: Single     Spouse name: None    Number of children: None    Years of education: None    Highest education level: None   Occupational History    None   Tobacco Use    Smoking status: Never     Passive exposure: Never    Smokeless tobacco: Never   Vaping Use    Vaping status: Never Used   Substance and Sexual Activity    Alcohol use: No    Drug use: Not Currently     Types: Marijuana    Sexual activity: Yes     Partners: Male     Birth control/protection: Pill     Comment: patient's cell [phone]; lives with Mom, may stay at boyfriends house on occassion.    Other Topics Concern    None   Social History Narrative    Lives with mother and sister.      Social Determinants of Health     Financial Resource Strain: Low Risk  (7/26/2023)    Overall Financial Resource Strain (CARDIA)     Difficulty of Paying Living Expenses: Not very hard   Food Insecurity: Patient Declined (3/14/2023)    Hunger Vital Sign     Worried About Running Out of Food in the Last Year: Patient declined     Ran Out of Food in the Last Year: Patient declined   Transportation Needs: No Transportation Needs (3/14/2023)    PRAPARE - Transportation     Lack of Transportation (Medical): No     Lack of Transportation (Non-Medical): No   Physical Activity: Insufficiently Active (1/25/2022)    Exercise Vital Sign     Days of Exercise per Week: 2 days     Minutes of Exercise per Session: 30 min   Stress: Not on file   Social Connections: Socially Isolated (1/25/2022)    Social Connection and Isolation Panel [NHANES]     Frequency of Communication with Friends and Family: Three times a week     Frequency of Social Gatherings with Friends and Family: Three times a week     Attends Religion Services: Never     Active Member of Clubs or Organizations: No     Attends Club or Organization Meetings: Never     Marital Status: Never    Intimate Partner Violence: Not At Risk (1/25/2022)    Humiliation, Afraid, Rape, and Kick questionnaire      Fear of Current or Ex-Partner: No     Emotionally Abused: No     Physically Abused: No     Sexually Abused: No   Housing Stability: Low Risk  (1/25/2022)    Housing Stability Vital Sign     Unable to Pay for Housing in the Last Year: No     Number of Places Lived in the Last Year: 1     Unstable Housing in the Last Year: No     Current Outpatient Medications on File Prior to Visit   Medication Sig    mirtazapine (REMERON) 7.5 MG tablet TAKE 1 TABLET (7.5 MG TOTAL) BY MOUTH DAILY AT BEDTIME    norgestimate-ethinyl estradiol (ORTHO-CYCLEN) 0.25-35 MG-MCG per tablet Take 1 tablet by mouth daily    rizatriptan (MAXALT) 10 mg tablet Take 1 tablet (10 mg total) by mouth once as needed for migraine May repeat in 2 hours if needed. Max 2/24 hours, 9/month.    topiramate (TOPAMAX) 25 mg tablet 1 tab PO QHS for 1 week, increase as tolerated to 1 tab BID for 1 week, then 1 tab QAM and 2 tabs QHS for 1 week and finish at 2 tabs BID.     Allergies   Allergen Reactions    Garcinia Mangostana Extract [Garcinia Cambogia] Lip Swelling     Immunization History   Administered Date(s) Administered    COVID-19 MODERNA VACC 0.5 ML IM 04/27/2021, 05/25/2021    DTaP 5 2001, 2001, 2001, 10/30/2002, 05/18/2005, 11/20/2012    H1N1, All Formulations 03/10/2010    HPV Quadrivalent 11/20/2012, 01/22/2014, 01/26/2015    Hep A, adult 02/23/2021    Hep A, ped/adol, 2 dose 03/25/2020    Hep B, adult 2001, 2001, 2001    Hib (PRP-OMP) 2001, 2001, 2001, 10/30/2002    IPV 2001, 2001, 2001, 05/18/2005    Influenza LAIV (Nasal) 01/12/2016    Influenza Quadrivalent Preservative Free 3 years and older IM 01/26/2015    Influenza, injectable, quadrivalent, preservative free 0.5 mL 03/25/2020, 03/15/2023, 02/20/2024    Influenza, seasonal, injectable 03/10/2010, 12/14/2011, 11/20/2012, 01/22/2014, 10/07/2016, 12/11/2017    MMR 07/11/2002, 05/18/2005    Meningococcal B, Recombinant  (TRUMENBA) 03/25/2020    Meningococcal, Unknown Serogroups 11/20/2012, 12/11/2017    Pneumococcal Conjugate PCV 7 2001, 2001, 2001    Tdap 03/25/2020    Varicella 07/11/2002, 07/12/2007    influenza, injectable, quadrivalent 01/11/2019       Objective     /68 (BP Location: Left arm, Patient Position: Sitting, Cuff Size: Standard)   Pulse 70   Temp 97.8 °F (36.6 °C) (Temporal)   Wt 59.2 kg (130 lb 9.6 oz)   SpO2 99%   BMI 23.89 kg/m²     Physical Exam  Vitals and nursing note reviewed.   Constitutional:       General: She is not in acute distress.     Appearance: She is well-developed. She is not diaphoretic.   HENT:      Head: Normocephalic and atraumatic.      Right Ear: External ear normal.      Left Ear: External ear normal.   Eyes:      General:         Right eye: No discharge.         Left eye: No discharge.      Conjunctiva/sclera: Conjunctivae normal.   Cardiovascular:      Rate and Rhythm: Normal rate and regular rhythm.   Pulmonary:      Effort: Pulmonary effort is normal. No respiratory distress.      Breath sounds: Normal breath sounds. No wheezing.   Abdominal:      General: Bowel sounds are normal. There is no distension.      Palpations: Abdomen is soft. There is no mass.      Tenderness: There is abdominal tenderness in the epigastric area. There is no right CVA tenderness, left CVA tenderness, guarding or rebound.      Hernia: No hernia is present.      Comments: +mild TTP    Musculoskeletal:         General: No deformity. Normal range of motion.      Cervical back: Normal range of motion and neck supple.   Lymphadenopathy:      Cervical: No cervical adenopathy.   Skin:     General: Skin is warm and dry.      Capillary Refill: Capillary refill takes less than 2 seconds.      Findings: No rash.   Neurological:      General: No focal deficit present.      Mental Status: She is alert and oriented to person, place, and time.      Sensory: No sensory deficit.       Coordination: Coordination normal.      Deep Tendon Reflexes: Reflexes normal.   Psychiatric:         Mood and Affect: Mood normal.         Behavior: Behavior normal.       SIL Barclay

## 2024-02-21 ENCOUNTER — TELEPHONE (OUTPATIENT)
Dept: NEUROLOGY | Facility: CLINIC | Age: 24
End: 2024-02-21

## 2024-02-21 PROBLEM — R10.84 GENERALIZED ABDOMINAL PAIN: Status: ACTIVE | Noted: 2024-02-21

## 2024-02-21 LAB
A-LACTALB IGE QN: 0.14 KAU/I
ALMOND IGE QN: <0.1 KUA/I
B-LACTOGLOB IGE QN: <0.1 KAU/I
C TRACH DNA SPEC QL NAA+PROBE: NEGATIVE
CASEIN IGE QN: 0.22 KAU/I
CASHEW NUT IGE QN: <0.1 KUA/I
CODFISH IGE QN: <0.1 KUA/I
EGG WHITE IGE QN: 0.25 KUA/I
ENDOMYSIUM IGA SER QL: NEGATIVE
GLIADIN PEPTIDE IGA SER-ACNC: 7 UNITS (ref 0–19)
GLIADIN PEPTIDE IGG SER-ACNC: 2 UNITS (ref 0–19)
GLUTEN IGE QN: <0.1 KUA/I
HAZELNUT IGE QN: <0.1 KUA/L
HIV 1+2 AB+HIV1 P24 AG SERPL QL IA: NORMAL
HIV 2 AB SERPL QL IA: NORMAL
HIV1 AB SERPL QL IA: NORMAL
HIV1 P24 AG SERPL QL IA: NORMAL
IGA SERPL-MCNC: 386 MG/DL (ref 87–352)
MILK IGE QN: 0.34 KUA/I
N GONORRHOEA DNA SPEC QL NAA+PROBE: NEGATIVE
OVALB IGE QN: 0.23 KAU/I
OVOMUCOID IGE QN: 0.15 KAU/I
PEANUT IGE QN: <0.1 KUA/I
SALMON IGE QN: <0.1 KUA/I
SCALLOP IGE QN: <0.1 KUA/L
SESAME SEED IGE QN: <0.1 KUA/I
SHRIMP IGE QN: <0.1 KUA/L
SOYBEAN IGE QN: <0.1 KUA/I
TOTAL IGE SMQN RAST: 80.5 KU/L (ref 0–113)
TREPONEMA PALLIDUM IGG+IGM AB [PRESENCE] IN SERUM OR PLASMA BY IMMUNOASSAY: NORMAL
TTG IGA SER-ACNC: <2 U/ML (ref 0–3)
TTG IGG SER-ACNC: 4 U/ML (ref 0–5)
TUNA IGE QN: <0.1 KUA/I
WALNUT IGE QN: <0.1 KUA/I
WHEAT IGE QN: <0.1 KUA/I

## 2024-02-21 NOTE — ASSESSMENT & PLAN NOTE
Chronic issue, associated with bloating and frequent loose stool   Possibly due to lactose or food intolerance   Discussed eliminating dairy to see if sx improve   Will send for testing   Consider GI referral if sx do not improve with diet changes

## 2024-02-21 NOTE — TELEPHONE ENCOUNTER
Called patient and left voicemail to confirm their upcoming appointment with Dr. Ellington. Informed patient about arriving in the [place] location 15 minutes prior to appointment to get checked in and go over chart.

## 2024-02-26 DIAGNOSIS — R10.84 GENERALIZED ABDOMINAL PAIN: ICD-10-CM

## 2024-02-26 DIAGNOSIS — R14.0 BLOATING: Primary | ICD-10-CM

## 2024-02-28 ENCOUNTER — OFFICE VISIT (OUTPATIENT)
Dept: NEUROLOGY | Facility: CLINIC | Age: 24
End: 2024-02-28
Payer: MEDICARE

## 2024-02-28 VITALS
HEIGHT: 62 IN | TEMPERATURE: 98.1 F | DIASTOLIC BLOOD PRESSURE: 74 MMHG | HEART RATE: 82 BPM | BODY MASS INDEX: 23.55 KG/M2 | SYSTOLIC BLOOD PRESSURE: 117 MMHG | WEIGHT: 128 LBS

## 2024-02-28 DIAGNOSIS — R29.818 SUSPECTED SLEEP APNEA: ICD-10-CM

## 2024-02-28 DIAGNOSIS — G25.81 RLS (RESTLESS LEGS SYNDROME): ICD-10-CM

## 2024-02-28 DIAGNOSIS — G43.009 MIGRAINE WITHOUT AURA AND WITHOUT STATUS MIGRAINOSUS, NOT INTRACTABLE: Primary | ICD-10-CM

## 2024-02-28 PROCEDURE — 99214 OFFICE O/P EST MOD 30 MIN: CPT | Performed by: PSYCHIATRY & NEUROLOGY

## 2024-02-28 NOTE — PROGRESS NOTES
[facility] Neurology Conc[facility]Headache [facility] Consult - Follow up   PATIENT:  Amanda Muse  MRN:  [id number]  :  2000  DATE OF SERVICE:  2024  REFERRED BY: No ref. provider found  PMD: SIL Barclay    Assessment/Plan:   Amanda Muse is a delightful  23 y.o. female with a past medical history that includes Migraine, anxiety, ADHD, Depression, OCD, intermittent palpitations referred here for evaluation of headache.  My initial evaluation 8/15/2022    Migraine without aura and without status migrainosus, not intractable  She reports a long history of headaches and migraines dating back to her teens as well as a family history in mom. She has followed with peds neuro in the past and has had MRI Brain. She reports pain is typical right frontal/temporal/retro-orbital, without aura and with typical associated migrainous features.   - as of 8/15/2022: migraines frequency better lately, on average 10 a month. She is currently on mirtazapine for mood/sleep but only takes occasionally and we discussed first would try this consistently to see if treatment of the latter could also decrease migraines and get sleep back on track prior to work starting soon. Otherwise we discussed could consider low dose propranolol if tolerated or CGRP med for prevention. Trial of rizatriptan for abortive.   - as of 2023: Reports significant improvement since last visit and she loves rizatriptan which works well for rescue.  Migraines 1-2 a month on average. Stress better, sleeping better and taking mirtazapine nightly.   - as of 2023: She reports migraines on average 4-5 a month although last month was over 9 and therefore she ran out of rizatriptan and thus this visit moved up to discuss preventative options.  We discussed trial of topiramate for prevention.  No plans on pregnancy for 5 years or more.  - as of 2024: She reports she has had improvement since last visit although she did not tolerate  topiramate and only took 25 mg for a few days because of the side effects, she is now doing better and back to her baseline of 4-5 migraines per month which resolve completely with rizatriptan 10 mg, even recently 5 mg worked when taken early.  She is not currently interested in prescription preventative although we discussed options available if she were to be.  She does have poor sleep with features of restless legs and possible sleep apnea and sleep study ordered as well and iron studies to evaluate for treatable causes of PLMD/RLS.    Workup:  - MRI Brain wo contrast 5/4/22: A few small scattered white matter hyperintensities are seen within the frontal white matter bilaterally.  These are nonspecific for a patient of this age. Per radiology    Preventative:  - we discussed headache hygiene and lifestyle factors that may improve headaches  -  she reports she is not interested in prescription preventative at this time  - On through other providers: mirtazapine 7.5 mg nightly   - Past/ failed/contraindicated: amitriptyline, Topiramate, antihypertensive would be contraindicated due to hypotension  - future options: CGRP med such as emgality next, botox    Rescue:  - discussed not taking over-the-counter or prescription pain medications more than 3 days per week to prevent medication overuse/rebound headache  -     rizatriptan (MAXALT) 10 mg tablet; Take 1 tablet (10 mg total) by mouth once as needed for migraine May repeat in 2 hours if needed. Max 2/24 hours, 9/month. Discussed proper use, possible side effects and risks.  - Past/ failed/contraindicated: sumatriptan 25 mg worked sometimes only, I do not recommend fioricet, exedrin caused an anxiety attack due to heart palpitations   - past/helped: migraine cocktails in ED, steroids, toradol IM   - future options:   prochlorperazine, Toradol IM or p.o., could consider trial of 5 days of Depakote 500 mg nightly or dexamethasone 2 mg daily for prolonged migraine,  ubrelvy, reyvow, nurtec    Suspected sleep apnea  RLS (restless legs syndrome)  -     Iron Panel (Includes Ferritin, Iron Sat%, Iron, and TIBC); Future  -     Ambulatory Referral to Sleep Medicine; Future -this is a referral for am in lab sleep study as we know Medicaid does not cover home studies typically     Patient instructions      We will order iron panel just to see if you are low due to the possible restless legs at night and if low please follow-up with PCP for repletion as low iron is really not my of expertise if they are agreeable.      I am placing a referral for a sleep study and if it is abnormal we will place one to the sleep medicine specialist team to further evaluate your sleep issues.  Please call [phone] to schedule the sleep study (wait until Friday to call) and afterwards if needed I recommend you see one of the following providers:  Leonel Johnson PA-C      Headache/migraine treatment:   Rescue medications (for immediate treatment of a headache):   It is ok to take ibuprofen, acetaminophen or naproxen (Advil, Tylenol,  Aleve, Excedrin) if they help your headaches you should limit these to No more than 3 times a week to avoid medication overuse/rebound headaches.     For your more moderate to severe migraines take this medication early   Maxalt (rizatriptan) 10mg tabs - take one at the onset of headache. May repeat one time after 2 hours if pain has not resolved.   (Max 2 a day and 9 a month)       Prescription preventive medications for headaches/migraines   (to take every day to help prevent headaches - not to take at the time of headache):  [x] - we have options if needed      *Typically these types of medications take time until you see the benefit, although some may see improvement in days, often it may take weeks, especially if the medication is being titrated up to  a beneficial level. Please contact us if there are any concerns or questions regarding the medication.     Lifestyle Recommendations:  [x] SLEEP - Maintain a regular sleep schedule: Adults need at least 7-8 hours of uninterrupted a night. Maintain good sleep hygiene:  Going to bed and waking up at consistent times, avoiding excessive daytime naps, avoiding caffeinated beverages in the evening, avoid excessive stimulation in the evening and generally using bed primarily for sleeping.  One hour before bedtime would recommend turning lights down lower, decreasing your activity (may read quietly, listen to music at a low volume). When you get into bed, should eliminate all technology (no texting, emailing, playing with your phone, iPad or tablet in bed).  [x] HYDRATION - Maintain good hydration.  Drink  2L of fluid a day (4 typical small water bottles)  [x] DIET - Maintain good nutrition. In particular don't skip meals and try and eat healthy balanced meals regularly.  [x] TRIGGERS - Look for other triggers and avoid them: Limit caffeine to 1-2 cups a day or less. Avoid dietary triggers that you have noticed bring on your headaches (this could include aged cheese, peanuts, MSG, aspartame and nitrates).  [x] EXERCISE - physical exercise as we all know is good for you in many ways, and not only is good for your heart, but also is beneficial for your mental health, cognitive health and  chronic pain/headaches. I would encourage at the least 5 days of physical exercise weekly for at least 30 minutes.     Education and Follow-up  [x] Please call with any questions or concerns. Of course if any new concerning symptoms go to the emergency department.  [x] Follow up 3-6 months, sooner if needed       CC:   We had the pleasure of evaluating Amanda Muse in neurological consultation today. Amanda Muse is a   right handed female who presents today for evaluation of headaches.     History obtained from patient as well as  available medical record review.  History of Present Illness:   Interval history as of 2/28/2024  - no significant new or concerning neurologic symptoms since last visit  - 4/2023 and next 4/2024 - wears glasses, no changes last year, sometimes while staring the background will look weird for 9/2023  - saw PCP for OM 7/2023 - had the past year   - PCP referred to GI, everything she eats doesn't sit well and feel bloated   - 2/20/2024 BMP unremarkable, LDL 71, RPR and HIV negative, allergy labs are not my of expertise but there appear to be some abnormality  - sleeping only 6 hours, bed around 12/1 and up around 730/8, often less, no problems falling asleep with mirtazapine, bed at 11 and up at 8, not aware if she snores, forgets to breathe at times, talks in her sleep, sleeps on belly and legs bent, cousin said she has restless legs.  Class IV Mallampati score    Headaches and migraines   She reports 4-5 headaches a month and rizatriptan helps    Preventative:   -Trial of topiramate with gradual titration up to 50 mg twice daily was started 6/6/2023 -  She self discontinued this as she did not like the side effects - body felt weird she thinks from not eating, drowsy and decreased appetite at 25 mg nightly and only took a few days   - On through other providers: mirtazapine 7.5 mg nightly     Abortive:   Rizatriptan works well  - not needing a second one as much, for the first time took 1/2 of one and since it was not as strong and took it early and worked   Denies bothersome side effects       Interval history as of 6/6/2023  - no significant new or concerning neurologic symptoms since last visit   - sleep 6 hours at least, often less, no problems falling asleep with mirtazapine, bed at 11 and up at 8, not told she snores, talks in her sleep, sleeps on belly and legs bent  - no kids for 5 years     Headaches and migraines   On 5/13/2023 she requested more than 9 rizatriptan a month and we discussed if she is  "needing more than 9 a month she may want to consider a preventative medication  4-5 a month on avg, over 9 last month and shes not sure why    Preventative:   - On through other providers: mirtazapine 7.5 mg nightly - forgets maybe 3 a month     Abortive:   -Rizatriptan - no SE, takes a second one 40% of the time  Denies bothersome side effects        Interval history as of 2/13/2023  - denies any new or concerning neurologic symptoms since last visit     Headaches and migraines   Much better, maybe 1-2 month on average     Preventative:   - On through other providers: mirtazapine 7.5 mg nightly     Abortive:   Trial of rizatriptan 10 mg in place of sumatriptan 25 mg - \"I love it\"  Denies bothersome side effects      History as of initial visit 8/15/22:   She has followed with peds neurology in the past     Headaches started at what age? 14-15 years old  How often do the headaches occur?   - as of 8/15/2022: waxes and wanes, better lately, on average 10 a month   What time of the day do the headaches start?  No particular time of day  How long do the headaches last? Days   Are you ever headache free? Yes    Aura? without aura     Last eye exam: wears glasses, 3/2022 normal except for glasses    Where is your headache located and pain quality?   - more on right temple and then can go to right retro-orbital and stays, can be throbbing/pulstating and sharp at times   - once in blue moon on the left    What is the intensity of pain? Average: 10/10, worst 10/10  Associated symptoms:   [] Nausea       [] Vomiting     [x] decreased appetite     [x] Photophobia     [x]Phonophobia      [] Osmophobia  [] Blurred vision   [x] Light-headed or dizzy  - sometimes   [] Tinnitus   [] Hands or feet tingle or feel numb/paresthesias    [] Ptosis      [] Facial droop  [] Lacrimation  [] Nasal congestion - always     Things that make the headache worse? No specific movements, any quick head movement     Headache triggers:  Stress, " hormonal    Have you seen someone else for headaches or pain? Yes, neurology in the past   Have you had trigger point injection performed and how often? No  Have you had Botox injection performed and how often? No   Have you had epidural injections or transforaminal injections performed? No  Are you current pregnant or planning on getting pregnant? Not for years, on BC   Have you ever had any Brain imaging? yes     What medications do you take or have you taken for your headaches?   ABORTIVE:    OTC medications have been ineffective     Sumatriptan 25 mg - no SE     Past   Steroids - no SE  toradol IM   fioricet  Migraine cocktail    PREVENTIVE:     Mirtazapine - been on it about a year, takes when has a headache to help her sleep     Past/ failed/contraindicated:  Amitriptyline 25 mg       LIFESTYLE  Sleep   - averages: delayed during summer/late night owl during summer, avg 6 hours, rec sleep cycle    Water: 2 bottles per day  Caffeine: none per day    Mood: anxiety, depression, OCD, ADHD dx by psychiatry in the past, now with PCP, anxiety is not great     The following portions of the patient's history were reviewed and updated as appropriate: allergies, current medications, past family history, past medical history, past social history, past surgical history and problem list.    Pertinent family history:  Family history of headaches:  migraine headaches in mother  Any family history of aneurysms - No    Pertinent social history:  Work: [employer] starts again in Sept 2022  Lives with mom     Past Medical History:     Past Medical History:   Diagnosis Date    ADHD (attention deficit hyperactivity disorder)     last seen 1 year ago, no recent RX, was on adderal, off at least 1 year     Allergic     Anxiety     Headache(784.0)     Migraine     OCD (obsessive compulsive disorder)     no treatment       Patient Active Problem List   Diagnosis    Encounter for surveillance of contraceptive pills    ADHD  (attention deficit hyperactivity disorder)    Depressed mood    OCD (obsessive compulsive disorder)    High risk heterosexual behavior    Other headache syndrome    Anxiety    Headache    Abnormal CBC    Intermittent palpitations    Poor weight gain in adult    Exposure to COVID-19 virus    Migraine without status migrainosus, not intractable    Generalized abdominal pain       Medications:      Current Outpatient Medications   Medication Sig Dispense Refill    azelastine (ASTELIN) 0.1 % nasal spray 1 spray into each nostril 2 (two) times a day Use in each nostril as directed 30 mL 2    cetirizine (ZyrTEC) 10 mg tablet Take 1 tablet (10 mg total) by mouth daily 90 tablet 1    mirtazapine (REMERON) 7.5 MG tablet TAKE 1 TABLET (7.5 MG TOTAL) BY MOUTH DAILY AT BEDTIME 30 tablet 0    norgestimate-ethinyl estradiol (ORTHO-CYCLEN) 0.25-35 MG-MCG per tablet Take 1 tablet by mouth daily 28 tablet 11    rizatriptan (MAXALT) 10 mg tablet Take 1 tablet (10 mg total) by mouth once as needed for migraine May repeat in 2 hours if needed. Max 2/24 hours, 9/month. 9 tablet 12     No current facility-administered medications for this visit.        Allergies:      Allergies   Allergen Reactions    Garcinia Mangostana Extract [Garcinia Cambogia] Lip Swelling       Family History:     Family History   Problem Relation Age of Onset    Migraines Mother     ADD / ADHD Father     Heart disease Maternal Grandmother     Diabetes Maternal Grandmother        Social History:     Social History     Socioeconomic History    Marital status: Single     Spouse name: Not on file    Number of children: Not on file    Years of education: Not on file    Highest education level: Not on file   Occupational History    Not on file   Tobacco Use    Smoking status: Never     Passive exposure: Never    Smokeless tobacco: Never   Vaping Use    Vaping status: Never Used   Substance and Sexual Activity    Alcohol use: Yes     Comment: occ    Drug use: Not  Currently     Types: Marijuana    Sexual activity: Yes     Partners: Male     Birth control/protection: Pill     Comment: patient's cell [phone]; lives with Mom, may stay at boyfriends house on occassion.    Other Topics Concern    Not on file   Social History Narrative    Lives with mother and sister.      Social Determinants of Health     Financial Resource Strain: Low Risk  (7/26/2023)    Overall Financial Resource Strain (CARDIA)     Difficulty of Paying Living Expenses: Not very hard   Food Insecurity: Patient Declined (3/14/2023)    Hunger Vital Sign     Worried About Running Out of Food in the Last Year: Patient declined     Ran Out of Food in the Last Year: Patient declined   Transportation Needs: No Transportation Needs (3/14/2023)    PRAPARE - Transportation     Lack of Transportation (Medical): No     Lack of Transportation (Non-Medical): No   Physical Activity: Insufficiently Active (1/25/2022)    Exercise Vital Sign     Days of Exercise per Week: 2 days     Minutes of Exercise per Session: 30 min   Stress: Not on file   Social Connections: Socially Isolated (1/25/2022)    Social Connection and Isolation Panel [NHANES]     Frequency of Communication with Friends and Family: Three times a week     Frequency of Social Gatherings with Friends and Family: Three times a week     Attends Yazidism Services: Never     Active Member of Clubs or Organizations: No     Attends Club or Organization Meetings: Never     Marital Status: Never    Intimate Partner Violence: Not At Risk (1/25/2022)    Humiliation, Afraid, Rape, and Kick questionnaire     Fear of Current or Ex-Partner: No     Emotionally Abused: No     Physically Abused: No     Sexually Abused: No   Housing Stability: Low Risk  (1/25/2022)    Housing Stability Vital Sign     Unable to Pay for Housing in the Last Year: No     Number of Places Lived in the Last Year: 1     Unstable Housing in the Last Year: No         Objective:       Physical  "Exam:                                                                 Vitals:            Constitutional:    /74 (BP Location: Right arm, Patient Position: Sitting, Cuff Size: Standard)   Pulse 82   Temp 98.1 °F (36.7 °C) (Temporal)   Ht 5' 2\" (1.575 m)   Wt 58.1 kg (128 lb)   BMI 23.41 kg/m²   BP Readings from Last 3 Encounters:   02/28/24 117/74   02/20/24 100/68   08/23/23 110/80     Pulse Readings from Last 3 Encounters:   02/28/24 82   02/20/24 70   08/23/23 67         Well developed, well nourished, well groomed.        Psychiatric:  Normal behavior and appropriate affect        Neurological Examination:     Mental status/cognitive function:   Recent and remote memory appear intact. Attention span and concentration as well as fund of knowledge are appropriate for age. Normal language and spontaneous speech.  Cranial Nerves:   VII-facial expression symmetric  Motor Exam: symmetric bulk throughout. no atrophy, fasciculations or abnormal movements noted.   Coordination:  no apparent dysmetria, ataxia or tremor noted  Gait: steady casual gait          Pertinent lab results:   See EMR for recent labs  - 2/20/2024 BMP unremarkable, LDL 71, RPR and HIV negative, allergy labs are not my of expertise but there appear to be some abnormality  See EMR  - 6/6/22  CMP and CBC unremarkable except for WBC 10.29  - 4/30/20 HIV, RPR NR  TSH normal     Imaging: I have personally reviewed imaging and radiology read   Mri Brain wo contrast 5/4/22: A few small scattered white matter hyperintensities are seen within the frontal white matter bilaterally.  These are nonspecific for a patient of this age. Per radiology   Review of Systems:   ROS obtained by medical assistant and personally reviewed, but if any symptoms listed below say negative, does not mean patient has not had this symptom since last visit, please see HPI for details of symptoms discussed this visit. I recommended PCP follow up for non neurologic " problems.       Review of Systems   Constitutional:  Negative for appetite change and fever.   HENT: Negative.  Negative for hearing loss, tinnitus, trouble swallowing and voice change.    Eyes: Negative.  Negative for photophobia and pain.   Respiratory: Negative.  Negative for shortness of breath.    Cardiovascular: Negative.  Negative for palpitations.   Gastrointestinal: Negative.  Negative for nausea and vomiting.   Endocrine: Negative.  Negative for cold intolerance.   Genitourinary: Negative.  Negative for dysuria, frequency and urgency.   Musculoskeletal: Negative.  Negative for myalgias and neck pain.   Skin: Negative.  Negative for rash.   Neurological:  Positive for headaches. Negative for dizziness, tremors, seizures, syncope, facial asymmetry, speech difficulty, weakness, light-headedness and numbness.   Hematological: Negative.  Does not bruise/bleed easily.   Psychiatric/Behavioral: Negative.  Negative for confusion, hallucinations and sleep disturbance.            I have spent  29  minutes with Patient  today in which greater than 50% of this time was spent in counseling/coordination of care regarding Diagnostic results, Prognosis, Risks and benefits of tx options, Instructions for management, Patient education, Importance of tx compliance, Risk factor reductions, Impressions, Counseling / Coordination of care, Documenting in the medical record, Reviewing / ordering tests, medicine, procedures  , Obtaining or reviewing history  , and Communicating with other healthcare professionals . I also spent 5 minutes non face to face for this patient the same day.       Author:  Melissa Ellington MD 2/28/2024 5:18 PM

## 2024-02-28 NOTE — PATIENT INSTRUCTIONS
We will order iron panel just to see if you are low due to the possible restless legs at night and if low please follow-up with PCP for repletion as low iron is really not my of expertise if they are agreeable.      I am placing a referral for a sleep study and if it is abnormal we will place one to the sleep medicine specialist team to further evaluate your sleep issues.  Please call [phone] to schedule the sleep study (wait until Friday to call) and afterwards if needed I recommend you see one of the following providers:  Leonel Johnson PA-C      Headache/migraine treatment:   Rescue medications (for immediate treatment of a headache):   It is ok to take ibuprofen, acetaminophen or naproxen (Advil, Tylenol,  Aleve, Excedrin) if they help your headaches you should limit these to No more than 3 times a week to avoid medication overuse/rebound headaches.     For your more moderate to severe migraines take this medication early   Maxalt (rizatriptan) 10mg tabs - take one at the onset of headache. May repeat one time after 2 hours if pain has not resolved.   (Max 2 a day and 9 a month)       Prescription preventive medications for headaches/migraines   (to take every day to help prevent headaches - not to take at the time of headache):  [x] - we have options if needed      *Typically these types of medications take time until you see the benefit, although some may see improvement in days, often it may take weeks, especially if the medication is being titrated up to a beneficial level. Please contact us if there are any concerns or questions regarding the medication.     Lifestyle Recommendations:  [x] SLEEP - Maintain a regular sleep schedule: Adults need at least 7-8 hours of uninterrupted a night. Maintain good sleep hygiene:  Going to bed and waking up at consistent times, avoiding excessive  daytime naps, avoiding caffeinated beverages in the evening, avoid excessive stimulation in the evening and generally using bed primarily for sleeping.  One hour before bedtime would recommend turning lights down lower, decreasing your activity (may read quietly, listen to music at a low volume). When you get into bed, should eliminate all technology (no texting, emailing, playing with your phone, iPad or tablet in bed).  [x] HYDRATION - Maintain good hydration.  Drink  2L of fluid a day (4 typical small water bottles)  [x] DIET - Maintain good nutrition. In particular don't skip meals and try and eat healthy balanced meals regularly.  [x] TRIGGERS - Look for other triggers and avoid them: Limit caffeine to 1-2 cups a day or less. Avoid dietary triggers that you have noticed bring on your headaches (this could include aged cheese, peanuts, MSG, aspartame and nitrates).  [x] EXERCISE - physical exercise as we all know is good for you in many ways, and not only is good for your heart, but also is beneficial for your mental health, cognitive health and  chronic pain/headaches. I would encourage at the least 5 days of physical exercise weekly for at least 30 minutes.     Education and Follow-up  [x] Please call with any questions or concerns. Of course if any new concerning symptoms go to the emergency department.  [x] Follow up 3-6 months, sooner if needed

## 2024-03-05 DIAGNOSIS — G25.81 RLS (RESTLESS LEGS SYNDROME): Primary | ICD-10-CM

## 2024-03-05 DIAGNOSIS — R29.818 SUSPECTED SLEEP APNEA: ICD-10-CM

## 2024-04-10 ENCOUNTER — CONSULT (OUTPATIENT)
Dept: GASTROENTEROLOGY | Facility: MEDICAL CENTER | Age: 24
End: 2024-04-10

## 2024-04-10 ENCOUNTER — APPOINTMENT (OUTPATIENT)
Dept: LAB | Facility: MEDICAL CENTER | Age: 24
End: 2024-04-10
Payer: MEDICARE

## 2024-04-10 VITALS
WEIGHT: 127.6 LBS | BODY MASS INDEX: 23.48 KG/M2 | SYSTOLIC BLOOD PRESSURE: 113 MMHG | HEART RATE: 71 BPM | HEIGHT: 62 IN | DIASTOLIC BLOOD PRESSURE: 78 MMHG | TEMPERATURE: 98.4 F

## 2024-04-10 DIAGNOSIS — R19.7 DIARRHEA, UNSPECIFIED TYPE: Primary | ICD-10-CM

## 2024-04-10 DIAGNOSIS — G25.81 RLS (RESTLESS LEGS SYNDROME): ICD-10-CM

## 2024-04-10 DIAGNOSIS — R14.0 BLOATING: ICD-10-CM

## 2024-04-10 DIAGNOSIS — R10.84 GENERALIZED ABDOMINAL PAIN: ICD-10-CM

## 2024-04-10 DIAGNOSIS — R19.7 DIARRHEA, UNSPECIFIED TYPE: ICD-10-CM

## 2024-04-10 PROCEDURE — 82728 ASSAY OF FERRITIN: CPT

## 2024-04-10 PROCEDURE — 36415 COLL VENOUS BLD VENIPUNCTURE: CPT

## 2024-04-10 PROCEDURE — 83540 ASSAY OF IRON: CPT

## 2024-04-10 PROCEDURE — 83550 IRON BINDING TEST: CPT

## 2024-04-10 PROCEDURE — 86140 C-REACTIVE PROTEIN: CPT

## 2024-04-10 RX ORDER — DICYCLOMINE HYDROCHLORIDE 10 MG/1
10 CAPSULE ORAL 4 TIMES DAILY PRN
Qty: 30 CAPSULE | Refills: 2 | Status: SHIPPED | OUTPATIENT
Start: 2024-04-10

## 2024-04-10 RX ORDER — SIMETHICONE 180 MG
180 CAPSULE ORAL EVERY 6 HOURS PRN
Qty: 60 CAPSULE | Refills: 2 | Status: SHIPPED | OUTPATIENT
Start: 2024-04-10

## 2024-04-10 NOTE — PROGRESS NOTES
[facility] - Outpatient Consultation  Amanda Muse 23 y.o. female MRN: [id number]  Encounter: [id number]      Assessment and Plan    1.  Abdominal pain  2.  Abdominal bloating  3.  Intermittent diarrhea  The patient has daily bothersome abdominal bloating and intermittent generalized abdominal pain associated with diarrhea.  She states that the pain and diarrhea occurs about twice a month.  She did see blood with wiping on 1 occasion after having to strain to have a bowel movement.  No other GI symptoms or complaints.  Recent celiac serologies normal.  -Obtain H. pylori, C-reactive protein, and fecal calprotectin  -Start a low FODMAP diet  -As needed Bentyl and simethicone    Follow up 3 months     ______________________________________________________________________    History of Present Illness  Amanda Muse is a 23 y.o. female here for consultation of abdominal pain.  The patient states about 1-2 times per month she gets severe generalized abdominal pain but can even wake her up from sleeping.  She states that she will have a bowel movement and following this the pain will slowly dissipate.  When this occurs her bowel movement is loose and watery.  On 1 occasion after straining she saw blood with wiping.  Her only other GI symptom or complaint is abdominal bloating.  This occurs on a daily basis. Last OB visit 8/2023 normal.       Review of Systems   Constitutional:  Negative for activity change, appetite change, chills, fatigue, fever and unexpected weight change.   Gastrointestinal:  Positive for abdominal distention and abdominal pain. Negative for anal bleeding, blood in stool, constipation, diarrhea, nausea, rectal pain and vomiting.   Musculoskeletal:  Negative for back pain and gait problem.   Psychiatric/Behavioral:  Negative for confusion.        Past Medical History  Past Medical History:   Diagnosis Date    ADHD (attention deficit hyperactivity disorder)     last seen  1 year ago, no recent RX, was on adderal, off at least 1 year     Allergic     Anxiety     Headache(784.0)     Migraine     OCD (obsessive compulsive disorder)     no treatment       Past Social history  No past surgical history on file.  Social History     Socioeconomic History    Marital status: Single     Spouse name: Not on file    Number of children: Not on file    Years of education: Not on file    Highest education level: Not on file   Occupational History    Not on file   Tobacco Use    Smoking status: Never     Passive exposure: Never    Smokeless tobacco: Never   Vaping Use    Vaping status: Never Used   Substance and Sexual Activity    Alcohol use: Yes     Comment: occ    Drug use: Not Currently     Types: Marijuana    Sexual activity: Yes     Partners: Male     Birth control/protection: Pill     Comment: patient's cell [phone]; lives with Mom, may stay at boyfriends house on occassion.    Other Topics Concern    Not on file   Social History Narrative    Lives with mother and sister.      Social Determinants of Health     Financial Resource Strain: Low Risk  (7/26/2023)    Overall Financial Resource Strain (CARDIA)     Difficulty of Paying Living Expenses: Not very hard   Food Insecurity: Patient Declined (3/14/2023)    Hunger Vital Sign     Worried About Running Out of Food in the Last Year: Patient declined     Ran Out of Food in the Last Year: Patient declined   Transportation Needs: No Transportation Needs (3/14/2023)    PRAPARE - Transportation     Lack of Transportation (Medical): No     Lack of Transportation (Non-Medical): No   Physical Activity: Insufficiently Active (1/25/2022)    Exercise Vital Sign     Days of Exercise per Week: 2 days     Minutes of Exercise per Session: 30 min   Stress: Not on file   Social Connections: Socially Isolated (1/25/2022)    Social Connection and Isolation Panel [NHANES]     Frequency of Communication with Friends and Family: Three times a week     Frequency  of Social Gatherings with Friends and Family: Three times a week     Attends Zoroastrianism Services: Never     Active Member of Clubs or Organizations: No     Attends Club or Organization Meetings: Never     Marital Status: Never    Intimate Partner Violence: Not At Risk (1/25/2022)    Humiliation, Afraid, Rape, and Kick questionnaire     Fear of Current or Ex-Partner: No     Emotionally Abused: No     Physically Abused: No     Sexually Abused: No   Housing Stability: Low Risk  (1/25/2022)    Housing Stability Vital Sign     Unable to Pay for Housing in the Last Year: No     Number of Places Lived in the Last Year: 1     Unstable Housing in the Last Year: No     Social History     Substance and Sexual Activity   Alcohol Use Yes    Comment: occ     Social History     Substance and Sexual Activity   Drug Use Not Currently    Types: Marijuana     Social History     Tobacco Use   Smoking Status Never    Passive exposure: Never   Smokeless Tobacco Never       Past Family History  Family History   Problem Relation Age of Onset    Migraines Mother     ADD / ADHD Father     Heart disease Maternal Grandmother     Diabetes Maternal Grandmother        Current Medications  Current Outpatient Medications   Medication Sig Dispense Refill    azelastine (ASTELIN) 0.1 % nasal spray 1 spray into each nostril 2 (two) times a day Use in each nostril as directed 30 mL 2    cetirizine (ZyrTEC) 10 mg tablet Take 1 tablet (10 mg total) by mouth daily 90 tablet 1    mirtazapine (REMERON) 7.5 MG tablet TAKE 1 TABLET (7.5 MG TOTAL) BY MOUTH DAILY AT BEDTIME 30 tablet 0    norgestimate-ethinyl estradiol (ORTHO-CYCLEN) 0.25-35 MG-MCG per tablet Take 1 tablet by mouth daily 28 tablet 11    rizatriptan (MAXALT) 10 mg tablet Take 1 tablet (10 mg total) by mouth once as needed for migraine May repeat in 2 hours if needed. Max 2/24 hours, 9/month. 9 tablet 12     No current facility-administered medications for this visit.        Allergies  Allergies   Allergen Reactions    Garcinia Mangostana Extract [Garcinia Cambogia] Lip Swelling         The following portions of the patient's history were reviewed and updated as appropriate: allergies, current medications, past medical history, past social history, past surgical history and problem list.      Vitals  There were no vitals filed for this visit.      Physical Exam  Constitutional   General appearance: Patient is seated and in no acute distress, well appearing and well nourished.   Head and Face   Head and face: Normal.    Eyes   Conjunctiva and lids: No erythema, swelling or discharge.  Anicteric.  Ears, Nose, Mouth, and Throat   Hearing: Normal.    Neck: Supple, trachea midline.  Pulmonary   Respiratory effort: No increased work of breathing or signs of respiratory distress.    Cardiovascular   Examination of extremities for edema and/or varicosities: Normal.    Musculoskeletal   Gait and station: Normal   Skin   Skin and subcutaneous tissue: Warm, dry, and intact. No visible jaundice, lesions or rashes.  Psychiatric   Judgment and insight: Normal  Recent and remote memory:  Normal  Mood and affect: Normal      Results  No visits with results within 1 Day(s) from this visit.   Latest known visit with results is:   Appointment on 02/20/2024   Component Date Value    HIV-1 p24 Antigen 02/20/2024 Non-Reactive     HIV-1 Antibody 02/20/2024 Non-Reactive     HIV-2 Antibody 02/20/2024 Non-Reactive     HIV Ag-Ab 5th Gen 02/20/2024 Non-Reactive     Hepatitis C Ab 02/20/2024 Non-reactive     Syphilis Total Antibody 02/20/2024 Non-reactive     N gonorrhoeae, DNA Probe 02/20/2024 Negative     Chlamydia trachomatis, D* 02/20/2024 Negative     Cholesterol 02/20/2024 145     Triglycerides 02/20/2024 68     HDL, Direct 02/20/2024 60     LDL Calculated 02/20/2024 71     Non-HDL-Chol (CHOL-HDL) 02/20/2024 85     Sodium 02/20/2024 136     Potassium 02/20/2024 4.0     Chloride 02/20/2024 106     CO2  02/20/2024 23     ANION GAP 02/20/2024 7     BUN 02/20/2024 13     Creatinine 02/20/2024 0.60     Glucose, Fasting 02/20/2024 87     Calcium 02/20/2024 9.1     eGFR 02/20/2024 129     Fish Cod 02/20/2024 <0.10     Egg White 02/20/2024 0.25 (H)     Gluten 02/20/2024 <0.10     Milk, Cow's 02/20/2024 0.34 (H)     Peanut 02/20/2024 <0.10     [place] 02/20/2024 <0.10     Scallop IgE 02/20/2024 <0.10     Sesame Seed IgE 02/20/2024 <0.10     Shrimp 02/20/2024 <0.10     SOYBEAN 02/20/2024 <0.10     Tuna IgE 02/20/2024 <0.10     [place] 02/20/2024 <0.10     Wheat 02/20/2024 <0.10     Almonds 02/20/2024 <0.10     Cashew 02/20/2024 <0.10     Hazelnut 02/20/2024 <0.10     IgE 02/20/2024 80.5     IgA 02/20/2024 386 (H)     Gliadin IgA 02/20/2024 7     Gliadin IgG 02/20/2024 2     Tissue Transglut Ab IGG 02/20/2024 4     TISSUE TRANSGLUTAMINASE * 02/20/2024 <2     Endomysial IgA 02/20/2024 Negative     Alpha Lactalbumin 02/20/2024 0.14 (H)     Beta Lactoglobulin 02/20/2024 <0.10     Casein 02/20/2024 0.22 (H)     F232 Ovalbumin 02/20/2024 0.23 (H)     F233 Ovomucoid 02/20/2024 0.15 (H)        Radiology Results  No results found.    Orders  No orders of the defined types were placed in this encounter.

## 2024-04-11 LAB
CRP SERPL QL: 2.8 MG/L
FERRITIN SERPL-MCNC: 19 NG/ML (ref 11–307)
IRON SATN MFR SERPL: 27 % (ref 15–50)
IRON SERPL-MCNC: 142 UG/DL (ref 50–212)
TIBC SERPL-MCNC: 520 UG/DL (ref 250–450)
UIBC SERPL-MCNC: 378 UG/DL (ref 155–355)

## 2024-04-13 ENCOUNTER — APPOINTMENT (OUTPATIENT)
Dept: LAB | Facility: MEDICAL CENTER | Age: 24
End: 2024-04-13
Payer: MEDICARE

## 2024-04-13 DIAGNOSIS — R10.84 GENERALIZED ABDOMINAL PAIN: ICD-10-CM

## 2024-04-13 DIAGNOSIS — R14.0 BLOATING: ICD-10-CM

## 2024-04-13 DIAGNOSIS — R19.7 DIARRHEA, UNSPECIFIED TYPE: ICD-10-CM

## 2024-04-13 PROCEDURE — 83993 ASSAY FOR CALPROTECTIN FECAL: CPT

## 2024-04-13 PROCEDURE — 87338 HPYLORI STOOL AG IA: CPT

## 2024-04-16 LAB — H PYLORI AG STL QL IA: NEGATIVE

## 2024-04-18 LAB — CALPROTECTIN STL-MCNT: 5 UG/G (ref 0–120)

## 2024-05-15 DIAGNOSIS — Z30.41 ENCOUNTER FOR SURVEILLANCE OF CONTRACEPTIVE PILLS: ICD-10-CM

## 2024-05-15 RX ORDER — NORGESTIMATE AND ETHINYL ESTRADIOL 0.25-0.035
1 KIT ORAL DAILY
Qty: 28 TABLET | Refills: 0 | Status: SHIPPED | OUTPATIENT
Start: 2024-05-15

## 2024-06-03 ENCOUNTER — ANNUAL EXAM (OUTPATIENT)
Dept: OBGYN CLINIC | Facility: CLINIC | Age: 24
End: 2024-06-03

## 2024-06-03 VITALS
HEART RATE: 71 BPM | SYSTOLIC BLOOD PRESSURE: 108 MMHG | DIASTOLIC BLOOD PRESSURE: 66 MMHG | WEIGHT: 127.2 LBS | HEIGHT: 62 IN | BODY MASS INDEX: 23.41 KG/M2

## 2024-06-03 DIAGNOSIS — Z30.41 ENCOUNTER FOR SURVEILLANCE OF CONTRACEPTIVE PILLS: ICD-10-CM

## 2024-06-03 DIAGNOSIS — Z01.419 ENCOUNTER FOR ANNUAL ROUTINE GYNECOLOGICAL EXAMINATION: Primary | ICD-10-CM

## 2024-06-03 DIAGNOSIS — Z11.3 SCREEN FOR STD (SEXUALLY TRANSMITTED DISEASE): ICD-10-CM

## 2024-06-03 PROCEDURE — 87591 N.GONORRHOEAE DNA AMP PROB: CPT | Performed by: NURSE PRACTITIONER

## 2024-06-03 PROCEDURE — 99395 PREV VISIT EST AGE 18-39: CPT | Performed by: NURSE PRACTITIONER

## 2024-06-03 PROCEDURE — 87491 CHLMYD TRACH DNA AMP PROBE: CPT | Performed by: NURSE PRACTITIONER

## 2024-06-03 RX ORDER — NORGESTIMATE AND ETHINYL ESTRADIOL 0.25-0.035
1 KIT ORAL DAILY
Qty: 28 TABLET | Refills: 11 | Status: SHIPPED | OUTPATIENT
Start: 2024-06-03

## 2024-06-03 NOTE — PATIENT INSTRUCTIONS
Continue birth control pills as previously directed  STD results can take up to 2 weeks  Remember safe sex and condom use  Call with needs or concerns  Annual GYN exam is due in 1 year

## 2024-06-03 NOTE — LETTER
2024    To Amanda MERCEDES: 2000      This letter is to advise you that your recent CULTURES for gonorrhea and chlamydia were reviewed by me and are NORMAL.  Please contact the office for an appointment if you have any additional concerns.    SIL Cole

## 2024-06-03 NOTE — PROGRESS NOTES
Annual Exam    Assessment   1. Encounter for annual routine gynecological examination        2. Screen for STD (sexually transmitted disease)  Chlamydia/GC amplified DNA by PCR    RPR-Syphilis Screening (Total Syphilis IGG/IGM)    HIV 1/2 AG/AB W REFLEX [facility] only    Hepatitis C RNA, quantitative, PCR    Hepatitis B surface antigen      3. Encounter for surveillance of contraceptive pills  norgestimate-ethinyl estradiol (Joellen) 0.25-35 MG-MCG per tablet        well woman       Plan       All questions answered.  Breast self exam technique reviewed and patient encouraged to perform self-exam monthly.  Discussed healthy lifestyle modifications.  Follow up in 1 year.  GC specimen.     Patient Instructions   Continue birth control pills as previously directed  STD results can take up to 2 weeks  Remember safe sex and condom use  Call with needs or concerns  Annual GYN exam is due in 1 year  Pt verbalized understanding of all discussed.      Subjective      Amanda Muse is a 24 y.o.  female who presents for annual well woman exam. Periods are regular every 28-30 days, lasting 5 days. No intermenstrual bleeding, spotting, or discharge.   last WNL PAP  HPV vaccines ,  and     Depression Screening Follow-up Plan: Patient's depression screening was negative with a PHQ-2 score of 0. Their PHQ-9 score was 1. Clinically patient does not have depression. No treatment is required.      Current contraception: OCP (estrogen/progesterone), pt would like to continue, safe and effective use provided  History of abnormal Pap smear: no  Family history of uterine or ovarian cancer: no  Regular self breast exam: yes  History of abnormal mammogram: N/A  Family history of breast cancer: no  History of abnormal lipids: unknown  Menstrual History:  OB History          0    Para   0    Term   0       0    AB   0    Living   0         SAB   0    IAB   0    Ectopic   0    Multiple   0     "Live Births   0                Menarche age: 14  Patient's last menstrual period was 05/20/2024 (approximate).       The following portions of the patient's history were reviewed and updated as appropriate: allergies, current medications, past family history, past medical history, past social history, past surgical history and problem list.    Review of Systems  Pertinent items are noted in HPI.      Objective      /66 (BP Location: Left arm, Patient Position: Sitting, Cuff Size: Standard)   Pulse 71   Ht 5' 2\" (1.575 m)   Wt 57.7 kg (127 lb 3.2 oz)   LMP 05/20/2024 (Approximate)   BMI 23.27 kg/m²     General: alert and oriented, in no acute distress, alert, appears stated age, and cooperative   Heart: NSR   Lungs: clear to auscultation bilaterally, WNL respiratory effort, negative cough or SOB   Thyroid: Negative masses palpable   Abdomen: soft, non-tender, without masses or organomegaly palpable   Vulva: normal   Vagina: normal mucosa   Cervix: no cervical motion tenderness and no lesions   Uterus: normal size, non-tender, normal shape and consistency   Adnexa: normal adnexa   Urethra: normal   Breasts: NT,negative masses palpable, negative discharge, or dimpling             "

## 2024-06-03 NOTE — PROGRESS NOTES
Annual Exam    Assessment   1. Encounter for annual routine gynecological examination        2. Screen for STD (sexually transmitted disease)  Chlamydia/GC amplified DNA by PCR    RPR-Syphilis Screening (Total Syphilis IGG/IGM)    HIV 1/2 AG/AB W REFLEX [facility] only    Hepatitis C RNA, quantitative, PCR    Hepatitis B surface antigen      3. Encounter for surveillance of contraceptive pills  norgestimate-ethinyl estradiol (Joellen) 0.25-35 MG-MCG per tablet        well woman       Plan       All questions answered.  Breast self exam technique reviewed and patient encouraged to perform self-exam monthly.  Chlamydia specimen.  Contraception: OCP (estrogen/progesterone).  Discussed healthy lifestyle modifications.  Follow up in 1 year.  GC specimen.     There are no Patient Instructions on file for this visit.    Subjective      Amanda Muse is a 24 y.o.  female who presents for annual well woman exam. Periods are regular every 28-30 days, lasting 4 days. No intermenstrual bleeding, spotting, or discharge.  1 partner x 6 months, denies domestic violence    Depression Screening Follow-up Plan: Patient's depression screening was negative with a PHQ-2 score of 0. Their PHQ-9 score was 1. Clinically patient does not have depression. No treatment is required.      Current contraception: OCP (estrogen/progesterone)  History of abnormal Pap smear: no  Family history of uterine or ovarian cancer: no  Regular self breast exam: yes  History of abnormal mammogram: N/A  Family history of breast cancer: no  History of abnormal lipids: N/A  Menstrual History:  OB History          0    Para   0    Term   0       0    AB   0    Living   0         SAB   0    IAB   0    Ectopic   0    Multiple   0    Live Births   0                Menarche age: 13  Patient's last menstrual period was 2024 (approximate).       The following portions of the patient's history were reviewed and updated as appropriate:  "allergies, current medications, past family history, past medical history, past social history, past surgical history and problem list.    Review of Systems  Pertinent items are noted in HPI.      Objective      /66 (BP Location: Left arm, Patient Position: Sitting, Cuff Size: Standard)   Pulse 71   Ht 5' 2\" (1.575 m)   Wt 57.7 kg (127 lb 3.2 oz)   LMP 05/20/2024 (Approximate)   BMI 23.27 kg/m²     General: alert and oriented, in no acute distress, alert, appears stated age, and cooperative   Heart: NSR   Lungs: {lung exam:90365::\"clear to auscultation bilaterally\"}   Thyroid: Negative*** masses   Abdomen: {abd exam:38117::\"soft, non-tender, without masses or organomegaly\"}   Vulva: {vulva exam:56255}   Vagina: {vaginal exam:45999}   Cervix: {cervix exam:75221}   Uterus: {uterus exam:38275}   Adnexa: {adnexa:92949}   Urethra: {Desc; normal/abnormal w/wildcard:62381::\"normal\"}   Breasts: NT,negative masses, discharge, or dimpling***             "

## 2024-06-04 LAB
C TRACH DNA SPEC QL NAA+PROBE: NEGATIVE
N GONORRHOEA DNA SPEC QL NAA+PROBE: NEGATIVE

## 2024-06-18 ENCOUNTER — TELEPHONE (OUTPATIENT)
Dept: GASTROENTEROLOGY | Facility: CLINIC | Age: 24
End: 2024-06-18

## 2024-06-25 ENCOUNTER — TELEPHONE (OUTPATIENT)
Dept: NEUROLOGY | Facility: CLINIC | Age: 24
End: 2024-06-25

## 2024-06-25 NOTE — TELEPHONE ENCOUNTER
Called patient and left voicemail to confirm their upcoming appointment with Dr. Ellington. Informed patient about arriving in the [place] location 15 minutes prior to appointment to get checked in and go over chart.

## 2024-06-29 DIAGNOSIS — Z30.41 ENCOUNTER FOR SURVEILLANCE OF CONTRACEPTIVE PILLS: ICD-10-CM

## 2024-07-01 RX ORDER — NORGESTIMATE AND ETHINYL ESTRADIOL 0.25-0.035
1 KIT ORAL DAILY
Qty: 84 TABLET | Refills: 4 | Status: SHIPPED | OUTPATIENT
Start: 2024-07-01

## 2024-07-03 ENCOUNTER — TELEPHONE (OUTPATIENT)
Dept: NEUROLOGY | Facility: CLINIC | Age: 24
End: 2024-07-03

## 2024-07-03 ENCOUNTER — OFFICE VISIT (OUTPATIENT)
Dept: NEUROLOGY | Facility: CLINIC | Age: 24
End: 2024-07-03

## 2024-07-03 VITALS
HEART RATE: 78 BPM | HEIGHT: 62 IN | SYSTOLIC BLOOD PRESSURE: 103 MMHG | DIASTOLIC BLOOD PRESSURE: 63 MMHG | WEIGHT: 126 LBS | BODY MASS INDEX: 23.19 KG/M2

## 2024-07-03 DIAGNOSIS — G43.009 MIGRAINE WITHOUT AURA AND WITHOUT STATUS MIGRAINOSUS, NOT INTRACTABLE: Primary | ICD-10-CM

## 2024-07-03 DIAGNOSIS — G25.81 RLS (RESTLESS LEGS SYNDROME): ICD-10-CM

## 2024-07-03 PROCEDURE — 99213 OFFICE O/P EST LOW 20 MIN: CPT | Performed by: PSYCHIATRY & NEUROLOGY

## 2024-07-03 RX ORDER — RIZATRIPTAN BENZOATE 10 MG/1
10 TABLET ORAL ONCE AS NEEDED
Qty: 9 TABLET | Refills: 12 | Status: SHIPPED | OUTPATIENT
Start: 2024-07-03

## 2024-07-03 NOTE — LETTER
July 9, 2024    Amanda Muse  [address]   [place] [address]      Our office has been unsuccessful in trying to reach you by phone regarding:    ? Other:_____Insurance help_____________________________________________      Please see information below for insurance resources.    Medicaid- Online via [email or web address]; In person at your local [facility] assistance office or via paper application  Phone number to apply #[phone]  Marketplace- How to apply: www.lilia.com ([employer] is [place]'s official destination for shopping for quality health insurance plans and accessing financial assistance.  Open Enrollment is 11/1-12/15.  Fill out the application through the health insurance marketplace.  If it looks like anyone in your household qualifies for Medicaid, they will send your information to your state agency.    Outside of the Open Enrollment Period, [place] need to have a qualifying life event in order to enroll in coverage through [employer]. Life events include getting , having a baby, losing other qualifying health coverage, and several others. [employer] is not an alternative to employer-sponsored health insurance coverage while an individual is employed and has that coverage. If a tax filer has an offer of affordable health insurance coverage that meets minimum value standards through their employer, they are not eligible to enroll in a [employer] plan with financial assistance.  However, if a tax filer loses their employer-sponsored health coverage, they would then be eligible to enroll in coverage through [employer] by selecting the 'Loss of Minimum Essential Coverage' qualifying life event on their [employer] application.   [employer]'s Customer Service number is [phone].    Please contact our office if you need further assistance at [phone] and follow the prompts.    Sincerely,    Lisa Le  Outpatient   [facility]  [facility]  [phone]  [email or web address]

## 2024-07-03 NOTE — PATIENT INSTRUCTIONS
As we discussed although low iron is not my area of expertise, I have noticed that the sleep doctors recommend iron supplementation if someone's ferritin is below 75/80 if there are features of restless leg syndrome and therefore I would recommend considering taking over-the-counter iron supplementation as this may help with multiple symptoms and can discuss with PCP at next visit  -Typically many recommend taking this before bed because it can sometimes irritate the stomach, if it caused constipation absolutely would cut back and could take every other day or a few times a week just to bring up her level a little bit      I will have our  reach out regarding the insurance issue to see if she could help    If you remain without insurance and need to  prescriptions consider good Rx    I am placing a referral for a sleep study and if it is abnormal we will place one to the sleep medicine specialist team to further evaluate your sleep issues.  Please call [phone] to schedule the sleep study  and afterwards if needed I recommend you see one of the following providers:  Leonel Long       Headache/migraine treatment:   Rescue medications (for immediate treatment of a headache):   It is ok to take ibuprofen, acetaminophen or naproxen (Advil, Tylenol,  Aleve, Excedrin) if they help your headaches you should limit these to No more than 3 times a week to avoid medication overuse/rebound headaches.     For your more moderate to severe migraines take this medication early   Maxalt (rizatriptan) 10mg tabs - take one at the onset of headache. May repeat one time after 2 hours if pain has not resolved.   (Max 2 a day and 9 a month)       Prescription preventive medications for headaches/migraines   (to take every day to help prevent headaches - not to take  at the time of headache):  [x] - we have options if needed      *Typically these types of medications take time until you see the benefit, although some may see improvement in days, often it may take weeks, especially if the medication is being titrated up to a beneficial level. Please contact us if there are any concerns or questions regarding the medication.     Lifestyle Recommendations:  [x] SLEEP - Maintain a regular sleep schedule: Adults need at least 7-8 hours of uninterrupted a night. Maintain good sleep hygiene:  Going to bed and waking up at consistent times, avoiding excessive daytime naps, avoiding caffeinated beverages in the evening, avoid excessive stimulation in the evening and generally using bed primarily for sleeping.  One hour before bedtime would recommend turning lights down lower, decreasing your activity (may read quietly, listen to music at a low volume). When you get into bed, should eliminate all technology (no texting, emailing, playing with your phone, iPad or tablet in bed).  [x] HYDRATION - Maintain good hydration.  Drink  2L of fluid a day (4 typical small water bottles)  [x] DIET - Maintain good nutrition. In particular don't skip meals and try and eat healthy balanced meals regularly.  [x] TRIGGERS - Look for other triggers and avoid them: Limit caffeine to 1-2 cups a day or less. Avoid dietary triggers that you have noticed bring on your headaches (this could include aged cheese, peanuts, MSG, aspartame and nitrates).  [x] EXERCISE - physical exercise as we all know is good for you in many ways, and not only is good for your heart, but also is beneficial for your mental health, cognitive health and  chronic pain/headaches. I would encourage at the least 5 days of physical exercise weekly for at least 30 minutes.     Education and Follow-up  [x] Please call with any questions or concerns. Of course if any new concerning symptoms go to the emergency department.  [x] Follow up 6  months, sooner if needed

## 2024-07-03 NOTE — TELEPHONE ENCOUNTER
----- Message from Melissa Ellington MD sent at 7/3/2024 12:26 PM EDT -----  She is currently without insurance because she recently just made more than [employer] would allow and over the summer likely will not be making the same amount and plans to get this insurance back but in the fall may again make over the limit.  I recommended considering alternative insurance plans for the latter as going without insurance would not be recommended, if you could help with resources please

## 2024-07-03 NOTE — PROGRESS NOTES
[facility] Consult - Follow up   PATIENT:  Amanda Muse  MRN:  [id number]  :  2000  DATE OF SERVICE:  7/3/2024  REFERRED BY: No ref. provider found  PMD: SIL Barclay    Assessment/Plan:   Amanda Muse is a delightful  24 y.o. female with a past medical history that includes Migraine, anxiety, ADHD, Depression, OCD, intermittent palpitations referred here for evaluation of headache.  My initial evaluation 8/15/2022    Migraine without aura and without status migrainosus, not intractable  She reports a long history of headaches and migraines dating back to her teens as well as a family history in mom. She has followed with peds neuro in the past and has had MRI Brain. She reports pain is typical right frontal/temporal/retro-orbital, without aura and with typical associated migrainous features.   - as of 8/15/2022: migraines frequency better lately, on average 10 a month. She is currently on mirtazapine for mood/sleep but only takes occasionally and we discussed first would try this consistently to see if treatment of the latter could also decrease migraines and get sleep back on track prior to work starting soon. Otherwise we discussed could consider low dose propranolol if tolerated or CGRP med for prevention. Trial of rizatriptan for abortive.   - as of 2023: Reports significant improvement since last visit and she loves rizatriptan which works well for rescue.  Migraines 1-2 a month on average. Stress better, sleeping better and taking mirtazapine nightly.   - as of 2023: She reports migraines on average 4-5 a month although last month was over 9 and therefore she ran out of rizatriptan and thus this visit moved up to discuss preventative options.  We discussed trial of topiramate for prevention.  No plans on pregnancy for 5 years or more.  - as of 2024: She reports she has had improvement since last visit although she did not tolerate  topiramate and only took 25 mg for a few days because of the side effects, she is now doing better and back to her baseline of 4-5 migraines per month which resolve completely with rizatriptan 10 mg, even recently 5 mg worked when taken early.  She is not currently interested in prescription preventative although we discussed options available if she were to be.  She does have poor sleep with features of restless legs and possible sleep apnea and sleep study ordered as well and iron studies to evaluate for treatable causes of PLMD/RLS.  - as of 7/3/2024: She reports she is doing very well and improved as far as headache and migraine frequency with on average 3 migraines a month that resolve with rizatriptan.  She still plans on scheduling sleep study.  We discussed iron studies in detail including ferritin 19 and in the setting of restless legs, would recommend considering trial of iron supplementation over-the-counter.    Workup:  - MRI Brain wo contrast 5/4/22: A few small scattered white matter hyperintensities are seen within the frontal white matter bilaterally.  These are nonspecific for a patient of this age. Per radiology    Preventative:  - we discussed headache hygiene and lifestyle factors that may improve headaches  -  she is not interested in prescription preventative at this time  - On through other providers: mirtazapine 7.5 mg nightly   - Past/ failed/contraindicated: amitriptyline, Topiramate, antihypertensive would be contraindicated due to hypotension  - future options: CGRP med such as emgality next, botox    Rescue:  - discussed not taking over-the-counter or prescription pain medications more than 3 days per week to prevent medication overuse/rebound headache  -     rizatriptan (MAXALT) 10 mg tablet; Take 1 tablet (10 mg total) by mouth once as needed for migraine May repeat in 2 hours if needed. Max 2/24 hours, 9/month. Discussed proper use, possible side effects and risks.  - Past/  failed/contraindicated: sumatriptan 25 mg worked sometimes only, I do not recommend fioricet, exedrin caused an anxiety attack due to heart palpitations   - past/helped: migraine cocktails in ED, steroids, toradol IM   - future options:   prochlorperazine, Toradol IM or p.o., could consider trial of 5 days of Depakote 500 mg nightly or dexamethasone 2 mg daily for prolonged migraine, ubrelvy, reyvow, nurtec    Suspected sleep apnea  RLS (restless legs syndrome)  -     Ambulatory Referral to Sleep Medicine; Future -this is a referral for am in lab sleep study as we know Medicaid does not cover home studies typically   - as of 7/3/24 - She still plans on scheduling sleep study.  We discussed iron studies in detail including ferritin 19 and in the setting of restless legs, would recommend considering trial of iron supplementation over-the-counter.    Patient instructions        As we discussed although low iron is not my area of expertise, I have noticed that the sleep doctors recommend iron supplementation if someone's ferritin is below 75/80 if there are features of restless leg syndrome and therefore I would recommend considering taking over-the-counter iron supplementation as this may help with multiple symptoms and can discuss with PCP at next visit  -Typically many recommend taking this before bed because it can sometimes irritate the stomach, if it caused constipation absolutely would cut back and could take every other day or a few times a week just to bring up her level a little bit      I will have our  reach out regarding the insurance issue to see if she could help    If you remain without insurance and need to  prescriptions consider good Rx    I am placing a referral for a sleep study and if it is abnormal we will place one to the sleep medicine specialist team to further evaluate your sleep issues.  Please call [phone] to schedule the sleep study  and afterwards if needed I  recommend you see one of the following providers:  Leonel Saez DO   Chris Espitia       Headache/migraine treatment:   Rescue medications (for immediate treatment of a headache):   It is ok to take ibuprofen, acetaminophen or naproxen (Advil, Tylenol,  Aleve, Excedrin) if they help your headaches you should limit these to No more than 3 times a week to avoid medication overuse/rebound headaches.     For your more moderate to severe migraines take this medication early   Maxalt (rizatriptan) 10mg tabs - take one at the onset of headache. May repeat one time after 2 hours if pain has not resolved.   (Max 2 a day and 9 a month)       Prescription preventive medications for headaches/migraines   (to take every day to help prevent headaches - not to take at the time of headache):  [x] - we have options if needed      *Typically these types of medications take time until you see the benefit, although some may see improvement in days, often it may take weeks, especially if the medication is being titrated up to a beneficial level. Please contact us if there are any concerns or questions regarding the medication.     Lifestyle Recommendations:  [x] SLEEP - Maintain a regular sleep schedule: Adults need at least 7-8 hours of uninterrupted a night. Maintain good sleep hygiene:  Going to bed and waking up at consistent times, avoiding excessive daytime naps, avoiding caffeinated beverages in the evening, avoid excessive stimulation in the evening and generally using bed primarily for sleeping.  One hour before bedtime would recommend turning lights down lower, decreasing your activity (may read quietly, listen to music at a low volume). When you get into bed, should eliminate all technology (no texting, emailing, playing with your phone, iPad or tablet in bed).  [x] HYDRATION - Maintain good hydration.   Drink  2L of fluid a day (4 typical small water bottles)  [x] DIET - Maintain good nutrition. In particular don't skip meals and try and eat healthy balanced meals regularly.  [x] TRIGGERS - Look for other triggers and avoid them: Limit caffeine to 1-2 cups a day or less. Avoid dietary triggers that you have noticed bring on your headaches (this could include aged cheese, peanuts, MSG, aspartame and nitrates).  [x] EXERCISE - physical exercise as we all know is good for you in many ways, and not only is good for your heart, but also is beneficial for your mental health, cognitive health and  chronic pain/headaches. I would encourage at the least 5 days of physical exercise weekly for at least 30 minutes.     Education and Follow-up  [x] Please call with any questions or concerns. Of course if any new concerning symptoms go to the emergency department.  [x] Follow up 6 months, sooner if needed       CC:   We had the pleasure of evaluating Amanda Muse in neurological consultation today. Amanda Muse is a   right handed female who presents today for evaluation of headaches.     History obtained from patient as well as available medical record review.  History of Present Illness:   Interval history as of 7/3/2024  - no significant new or concerning neurologic symptoms since last visit   - going to [facility]  -Self-pay today, for the first time without insurance now for a month, works a part time job and some over time and made too much for a bit   - 4/10/2024 iron panel with normal iron, ferritin 19, iron saturation normal 27, TIBC 520, UIBC 378 and I recommend following up with PCP for iron repletion - especially with ferritin being 19 and how this can negatively impact sleep and thus migraines  - not scheduled sleep study yet    Headaches and migraines   Maybe 2-3 migraines a month and rizatriptan helps    Preventative:   - On through other providers: mirtazapine 7.5 mg nightly     Abortive:   Rizatriptan works  well - doesn't need second   Denies bothersome side effects     Interval history as of 2/28/2024  - no significant new or concerning neurologic symptoms since last visit  - 4/2023 and next 4/2024 - wears glasses, no changes last year, sometimes while staring the background will look weird for 9/2023  - saw PCP for OM 7/2023 - had the past year   - PCP referred to GI, everything she eats doesn't sit well and feel bloated   - 2/20/2024 BMP unremarkable, LDL 71, RPR and HIV negative, allergy labs are not my of expertise but there appear to be some abnormality  - sleeping only 6 hours, bed around 12/1 and up around 730/8, often less, no problems falling asleep with mirtazapine, bed at 11 and up at 8, not aware if she snores, forgets to breathe at times, talks in her sleep, sleeps on belly and legs bent, cousin said she has restless legs.  Class IV Mallampati score    Headaches and migraines   She reports 4-5 headaches a month and rizatriptan helps    Preventative:   -Trial of topiramate with gradual titration up to 50 mg twice daily was started 6/6/2023 -  She self discontinued this as she did not like the side effects - body felt weird she thinks from not eating, drowsy and decreased appetite at 25 mg nightly and only took a few days   - On through other providers: mirtazapine 7.5 mg nightly     Abortive:   Rizatriptan works well  - not needing a second one as much, for the first time took 1/2 of one and since it was not as strong and took it early and worked   Denies bothersome side effects       Interval history as of 6/6/2023  - no significant new or concerning neurologic symptoms since last visit   - sleep 6 hours at least, often less, no problems falling asleep with mirtazapine, bed at 11 and up at 8, not told she snores, talks in her sleep, sleeps on belly and legs bent  - no kids for 5 years     Headaches and migraines   On 5/13/2023 she requested more than 9 rizatriptan a month and we discussed if she is  "needing more than 9 a month she may want to consider a preventative medication  4-5 a month on avg, over 9 last month and shes not sure why    Preventative:   - On through other providers: mirtazapine 7.5 mg nightly - forgets maybe 3 a month     Abortive:   -Rizatriptan - no SE, takes a second one 40% of the time  Denies bothersome side effects        Interval history as of 2/13/2023  - denies any new or concerning neurologic symptoms since last visit     Headaches and migraines   Much better, maybe 1-2 month on average     Preventative:   - On through other providers: mirtazapine 7.5 mg nightly     Abortive:   Trial of rizatriptan 10 mg in place of sumatriptan 25 mg - \"I love it\"  Denies bothersome side effects      History as of initial visit 8/15/22:   She has followed with peds neurology in the past     Headaches started at what age? 14-15 years old  How often do the headaches occur?   - as of 8/15/2022: waxes and wanes, better lately, on average 10 a month   What time of the day do the headaches start?  No particular time of day  How long do the headaches last? Days   Are you ever headache free? Yes    Aura? without aura     Last eye exam: wears glasses, 3/2022 normal except for glasses    Where is your headache located and pain quality?   - more on right temple and then can go to right retro-orbital and stays, can be throbbing/pulstating and sharp at times   - once in blue moon on the left    What is the intensity of pain? Average: 10/10, worst 10/10  Associated symptoms:   [] Nausea       [] Vomiting     [x] decreased appetite     [x] Photophobia     [x]Phonophobia      [] Osmophobia  [] Blurred vision   [x] Light-headed or dizzy  - sometimes   [] Tinnitus   [] Hands or feet tingle or feel numb/paresthesias    [] Ptosis      [] Facial droop  [] Lacrimation  [] Nasal congestion - always     Things that make the headache worse? No specific movements, any quick head movement     Headache triggers:  Stress, " hormonal    Have you seen someone else for headaches or pain? Yes, neurology in the past   Have you had trigger point injection performed and how often? No  Have you had Botox injection performed and how often? No   Have you had epidural injections or transforaminal injections performed? No  Are you current pregnant or planning on getting pregnant? Not for years, on BC   Have you ever had any Brain imaging? yes     What medications do you take or have you taken for your headaches?   ABORTIVE:    OTC medications have been ineffective     Sumatriptan 25 mg - no SE     Past   Steroids - no SE  toradol IM   fioricet  Migraine cocktail    PREVENTIVE:     Mirtazapine - been on it about a year, takes when has a headache to help her sleep     Past/ failed/contraindicated:  Amitriptyline 25 mg       LIFESTYLE  Sleep   - averages: delayed during summer/late night owl during summer, avg 6 hours, rec sleep cycle    Water: 2 bottles per day  Caffeine: none per day    Mood: anxiety, depression, OCD, ADHD dx by psychiatry in the past, now with PCP, anxiety is not great     The following portions of the patient's history were reviewed and updated as appropriate: allergies, current medications, past family history, past medical history, past social history, past surgical history and problem list.    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  These are nonspecific for a patient of this age. Per radiology     Past Medical History:     Past Medical History:   Diagnosis Date    ADHD (attention deficit hyperactivity disorder)     last seen 1 year ago, no recent RX, was on adderal, off at least 1 year     Allergic     Anxiety     Headache(784.0)     Migraine     OCD (obsessive compulsive disorder)     no treatment       Patient Active Problem List   Diagnosis    Encounter for surveillance of contraceptive pills    ADHD (attention deficit hyperactivity disorder)    Depressed mood    OCD (obsessive compulsive disorder)    High risk heterosexual behavior    Other headache syndrome    Anxiety    Headache    Abnormal CBC    Intermittent palpitations    Poor weight gain in adult    Exposure to COVID-19 virus    Migraine without status migrainosus, not intractable    Generalized abdominal pain       Medications:      Current Outpatient Medications   Medication Sig Dispense Refill    azelastine (ASTELIN) 0.1 % nasal spray 1 spray into each nostril 2 (two) times a day Use in each nostril as directed 30 mL 2    cetirizine (ZyrTEC) 10 mg tablet Take 1 tablet (10 mg total) by mouth daily 90 tablet 1    Joellen 0.25-35 MG-MCG per tablet TAKE 1 TABLET BY MOUTH EVERY DAY 84 tablet 4    mirtazapine (REMERON) 7.5 MG tablet TAKE 1 TABLET (7.5 MG TOTAL) BY MOUTH DAILY AT BEDTIME 30 tablet 0    rizatriptan (MAXALT) 10 mg tablet Take 1 tablet (10 mg total) by mouth once as needed for migraine May repeat in 2 hours if needed. Max 2/24 hours, 9/month. 9 tablet 12    dicyclomine (BENTYL) 10 mg capsule Take 1 capsule (10 mg total) by mouth 4 (four) times a day as needed (abdominal pain) (Patient not taking: Reported on 7/3/2024) 30 capsule 2    simethicone (MYLICON,GAS-X) 180 MG capsule Take 1 capsule (180 mg total) by mouth every 6 (six) hours as needed for flatulence (Patient not taking: Reported on  7/3/2024) 60 capsule 2     No current facility-administered medications for this visit.        Allergies:      Allergies   Allergen Reactions    Garcinia Mangostana Extract [Garcinia Cambogia] Lip Swelling       Family History:     Family History   Problem Relation Age of Onset    Migraines Mother     ADD / ADHD Father     Heart disease Maternal Grandmother     Diabetes Maternal Grandmother        Social History:     Social History     Socioeconomic History    Marital status: Single     Spouse name: Not on file    Number of children: Not on file    Years of education: Not on file    Highest education level: Not on file   Occupational History    Not on file   Tobacco Use    Smoking status: Some Days     Types: Pipe     Passive exposure: Never    Smokeless tobacco: Never    Tobacco comments:     Hookah    Vaping Use    Vaping status: Never Used   Substance and Sexual Activity    Alcohol use: Yes     Comment: occasionally    Drug use: Not Currently     Types: Marijuana    Sexual activity: Yes     Partners: Male     Birth control/protection: Pill     Comment: patient's cell [phone]; lives with Mom, may stay at boyfriends house on occassion.    Other Topics Concern    Not on file   Social History Narrative    Lives with mother and sister.      Social Determinants of Health     Financial Resource Strain: Low Risk  (6/3/2024)    Overall Financial Resource Strain (CARDIA)     Difficulty of Paying Living Expenses: Not hard at all   Food Insecurity: No Food Insecurity (6/3/2024)    Hunger Vital Sign     Worried About Running Out of Food in the Last Year: Never true     Ran Out of Food in the Last Year: Never true   Transportation Needs: No Transportation Needs (6/3/2024)    PRAPARE - Transportation     Lack of Transportation (Medical): No     Lack of Transportation (Non-Medical): No   Physical Activity: Insufficiently Active (1/25/2022)    Exercise Vital Sign     Days of Exercise per Week: 2 days     Minutes of Exercise  "per Session: 30 min   Stress: Not on file   Social Connections: Socially Isolated (1/25/2022)    Social Connection and Isolation Panel [NHANES]     Frequency of Communication with Friends and Family: Three times a week     Frequency of Social Gatherings with Friends and Family: Three times a week     Attends Judaism Services: Never     Active Member of Clubs or Organizations: No     Attends Club or Organization Meetings: Never     Marital Status: Never    Intimate Partner Violence: Not At Risk (1/25/2022)    Humiliation, Afraid, Rape, and Kick questionnaire     Fear of Current or Ex-Partner: No     Emotionally Abused: No     Physically Abused: No     Sexually Abused: No   Housing Stability: Low Risk  (6/3/2024)    Housing Stability Vital Sign     Unable to Pay for Housing in the Last Year: No     Number of Times Moved in the Last Year: 1     Homeless in the Last Year: No         Objective:           Physical Exam:                                                                 Vitals:            Constitutional:    /63 (BP Location: Left arm, Patient Position: Sitting, Cuff Size: Standard)   Pulse 78   Ht 5' 2\" (1.575 m)   Wt 57.2 kg (126 lb)   LMP 05/20/2024 (Approximate)   BMI 23.05 kg/m²   BP Readings from Last 3 Encounters:   07/03/24 103/63   06/03/24 108/66   04/10/24 113/78     Pulse Readings from Last 3 Encounters:   07/03/24 78   06/03/24 71   04/10/24 71         Well developed, well nourished, well groomed.        Psychiatric:  Normal behavior and appropriate affect        Neurological Examination:     Mental status/cognitive function:   Recent and remote memory appear intact. Attention span and concentration as well as fund of knowledge are appropriate for age. Normal language and spontaneous speech.  Cranial Nerves:   VII-facial expression symmetric  Motor Exam: symmetric bulk throughout. no atrophy, fasciculations or abnormal movements noted.   Coordination:  no apparent dysmetria, " ataxia or tremor noted  Gait: steady casual gait        Review of Systems:   ROS obtained by medical assistant and reviewed, but if any symptoms listed below say negative, does not mean patient has not had this symptom since last visit, please see HPI for details of symptoms discussed this visit.  Regarding any abnormal or positive findings in ROS that are not neurologic related, patient instructed to address these issues with PCP or go to the ER if they are severe.      Review of Systems   Constitutional:  Negative for appetite change, fatigue and fever.   HENT: Negative.  Negative for hearing loss, tinnitus, trouble swallowing and voice change.    Eyes: Negative.  Negative for photophobia, pain and visual disturbance.   Respiratory: Negative.  Negative for shortness of breath.    Cardiovascular: Negative.  Negative for palpitations.   Gastrointestinal: Negative.  Negative for nausea and vomiting.   Endocrine: Negative.  Negative for cold intolerance.   Genitourinary: Negative.  Negative for dysuria, frequency and urgency.   Musculoskeletal:  Negative for back pain, gait problem, myalgias, neck pain and neck stiffness.   Skin: Negative.  Negative for rash.   Allergic/Immunologic: Negative.    Neurological: Negative.  Negative for dizziness, tremors, seizures, syncope, facial asymmetry, speech difficulty, weakness, light-headedness, numbness and headaches.   Hematological: Negative.  Does not bruise/bleed easily.   Psychiatric/Behavioral: Negative.  Negative for confusion, hallucinations and sleep disturbance.    I have spent 18 minutes with Patient  today in which greater than 50% of this time was spent in counseling/coordination of care regarding Prognosis, Risks and benefits of tx options, Instructions for management, Patient education, Importance of tx compliance, Risk factor reductions, Impressions, Counseling / Coordination of care, Documenting in the medical record, Reviewing / ordering tests, medicine,  procedures  , Obtaining or reviewing history  , and Communicating with other healthcare professionals . I also spent 10 minutes non face to face for this patient the same day.       Author:  Melissa Ellington MD 7/3/2024 12:35 PM

## 2024-07-09 NOTE — TELEPHONE ENCOUNTER
SW sent unable to reach letter.  Phone calls were attempted and letter provided.  SW has not heard back from patient as to how to proceed with task.  Will close task at this time.  No further questions or needs at this time.  SW will be available for future social needs as requested.

## 2024-09-17 ENCOUNTER — OFFICE VISIT (OUTPATIENT)
Dept: OBGYN CLINIC | Facility: CLINIC | Age: 24
End: 2024-09-17

## 2024-09-17 VITALS
WEIGHT: 130.8 LBS | BODY MASS INDEX: 23.92 KG/M2 | HEART RATE: 70 BPM | SYSTOLIC BLOOD PRESSURE: 107 MMHG | DIASTOLIC BLOOD PRESSURE: 70 MMHG

## 2024-09-17 DIAGNOSIS — N76.0 BV (BACTERIAL VAGINOSIS): ICD-10-CM

## 2024-09-17 DIAGNOSIS — B96.89 BV (BACTERIAL VAGINOSIS): ICD-10-CM

## 2024-09-17 DIAGNOSIS — N89.8 VAGINAL ODOR: Primary | ICD-10-CM

## 2024-09-17 PROBLEM — R00.2 INTERMITTENT PALPITATIONS: Status: RESOLVED | Noted: 2020-07-01 | Resolved: 2024-09-17

## 2024-09-17 PROBLEM — R51.9 HEADACHE: Status: RESOLVED | Noted: 2020-03-25 | Resolved: 2024-09-17

## 2024-09-17 PROBLEM — Z30.41 ENCOUNTER FOR SURVEILLANCE OF CONTRACEPTIVE PILLS: Status: RESOLVED | Noted: 2018-05-14 | Resolved: 2024-09-17

## 2024-09-17 PROBLEM — R62.7 POOR WEIGHT GAIN IN ADULT: Status: RESOLVED | Noted: 2021-02-23 | Resolved: 2024-09-17

## 2024-09-17 PROBLEM — G44.89 OTHER HEADACHE SYNDROME: Status: RESOLVED | Noted: 2019-04-15 | Resolved: 2024-09-17

## 2024-09-17 PROBLEM — Z20.822 EXPOSURE TO COVID-19 VIRUS: Status: RESOLVED | Noted: 2021-08-20 | Resolved: 2024-09-17

## 2024-09-17 PROBLEM — Z72.51 HIGH RISK HETEROSEXUAL BEHAVIOR: Status: RESOLVED | Noted: 2019-03-18 | Resolved: 2024-09-17

## 2024-09-17 PROBLEM — R10.84 GENERALIZED ABDOMINAL PAIN: Status: RESOLVED | Noted: 2024-02-21 | Resolved: 2024-09-17

## 2024-09-17 PROBLEM — R79.89 ABNORMAL CBC: Status: RESOLVED | Noted: 2020-04-30 | Resolved: 2024-09-17

## 2024-09-17 LAB
BV WHIFF TEST VAG QL: ABNORMAL
CLUE CELLS SPEC QL WET PREP: ABNORMAL
PH SMN: 5 [PH]
SL AMB POCT WET MOUNT: ABNORMAL
T VAGINALIS VAG QL WET PREP: ABNORMAL
YEAST VAG QL WET PREP: ABNORMAL

## 2024-09-17 PROCEDURE — 87491 CHLMYD TRACH DNA AMP PROBE: CPT | Performed by: NURSE PRACTITIONER

## 2024-09-17 PROCEDURE — 99213 OFFICE O/P EST LOW 20 MIN: CPT | Performed by: NURSE PRACTITIONER

## 2024-09-17 PROCEDURE — 87210 SMEAR WET MOUNT SALINE/INK: CPT | Performed by: NURSE PRACTITIONER

## 2024-09-17 PROCEDURE — 87591 N.GONORRHOEAE DNA AMP PROB: CPT | Performed by: NURSE PRACTITIONER

## 2024-09-17 RX ORDER — METRONIDAZOLE 7.5 MG/G
1 GEL VAGINAL
Qty: 70 G | Refills: 0 | Status: SHIPPED | OUTPATIENT
Start: 2024-09-17 | End: 2024-09-22

## 2024-09-17 NOTE — PATIENT INSTRUCTIONS
Thank you for your confidence in our team.   We appreciate you and welcome your feedback.   If you receive a survey from us, please take a few moments to let us know how we are doing.   Sincerely,  SIL Vaz

## 2024-09-17 NOTE — PROGRESS NOTES
PROBLEM GYNECOLOGICAL VISIT    Amanda Muse is a 24 y.o. female who presents today with complaint of vaginal odor.  Her general medical history has been reviewed and she reports it as follows:    Past Medical History:   Diagnosis Date    ADHD (attention deficit hyperactivity disorder)     Anxiety     Chlamydia 2019    Migraine     OCD (obsessive compulsive disorder)      Past Surgical History:   Procedure Laterality Date    NO PAST SURGERIES       OB History          0    Para   0    Term   0       0    AB   0    Living   0         SAB   0    IAB   0    Ectopic   0    Multiple   0    Live Births   0               Social History     Tobacco Use    Smoking status: Some Days     Types: Pipe     Passive exposure: Never    Smokeless tobacco: Never    Tobacco comments:     hookah couple times/month   Vaping Use    Vaping status: Never Used   Substance Use Topics    Alcohol use: Yes     Comment: couple times/month    Drug use: Not Currently     Types: Marijuana     Comment: last used marijuana      Social History     Substance and Sexual Activity   Sexual Activity Yes    Partners: Male    Birth control/protection: OCP       Current Outpatient Medications   Medication Instructions    azelastine (ASTELIN) 0.1 % nasal spray 1 spray, Nasal, 2 times daily, Use in each nostril as directed    cetirizine (ZYRTEC) 10 mg, Oral, Daily    Joellen 0.25-35 MG-MCG per tablet 1 tablet, Oral, Daily    rizatriptan (MAXALT) 10 mg, Oral, Once as needed, May repeat in 2 hours if needed. Max 2/24 hours, 9/month.       History of Present Illness:   Reports she has been experiencing vaginal odor for the past 2-3 weeks.  Denies unusual vaginal discharge or itching/irritation.  States used boric acid suppositories x3 episodes during this time without any relief with last time 2 days ago.  She has not been sexually active for awhile but was active again last week and did not use condom.    Review of Systems:  Review of Systems    Constitutional: Negative.    Gastrointestinal: Negative.    Genitourinary:  Negative for dysuria, menstrual problem, pelvic pain, vaginal discharge and vaginal pain.        Vaginal odor       Physical Exam:  /70 (BP Location: Left arm, Patient Position: Sitting, Cuff Size: Standard)   Pulse 70   Wt 59.3 kg (130 lb 12.8 oz)   LMP 09/05/2024 (Exact Date)   BMI 23.92 kg/m²   Physical Exam  Constitutional:       General: She is not in acute distress.  Genitourinary:      Vulva exam comments: Normal.      Vaginal discharge (minimal amount) present.      No vaginal erythema.   Abdominal:      Palpations: Abdomen is soft.      Tenderness: There is no abdominal tenderness.   Neurological:      Mental Status: She is alert.   Skin:     General: Skin is warm and dry.   Vitals reviewed.       Assessment:   1. BV.    Plan:   1. Cultures ordered: GC/CT.   2. Given Rx Flagyl.   3. Return to office as previously scheduled for annual GYN exam.    Reviewed with patient that test results are available in [facility] immediately, but that they will not necessarily be reviewed by me immediately.  Explained that I will review results at my earliest opportunity and contact patient appropriately.

## 2024-09-18 ENCOUNTER — TELEPHONE (OUTPATIENT)
Dept: OBGYN CLINIC | Facility: CLINIC | Age: 24
End: 2024-09-18

## 2024-09-18 DIAGNOSIS — A74.9 CHLAMYDIA: Primary | ICD-10-CM

## 2024-09-18 LAB
C TRACH DNA SPEC QL NAA+PROBE: POSITIVE
N GONORRHOEA DNA SPEC QL NAA+PROBE: NEGATIVE

## 2024-09-18 RX ORDER — DOXYCYCLINE 100 MG/1
100 TABLET ORAL 2 TIMES DAILY
Qty: 14 TABLET | Refills: 0 | Status: SHIPPED | OUTPATIENT
Start: 2024-09-18 | End: 2024-09-18

## 2024-09-18 RX ORDER — AZITHROMYCIN 1 G/1
1 POWDER, FOR SUSPENSION ORAL ONCE
Qty: 1 EACH | Refills: 0 | Status: SHIPPED | OUTPATIENT
Start: 2024-09-18 | End: 2024-09-18

## 2024-09-18 NOTE — TELEPHONE ENCOUNTER
I contacted patient by phone and advised her of + chlamydia culture and need for treatment.  Patient has difficulty swallowing pills.  Rx Azithromycin powder sent to her pharmacy.  Reviewed partner treatment and no sex until 1 week after treatment completed.  She verbalizes understanding.

## 2024-10-17 ENCOUNTER — APPOINTMENT (OUTPATIENT)
Dept: LAB | Facility: HOSPITAL | Age: 24
End: 2024-10-17

## 2024-10-17 ENCOUNTER — OFFICE VISIT (OUTPATIENT)
Dept: OBGYN CLINIC | Facility: CLINIC | Age: 24
End: 2024-10-17

## 2024-10-17 VITALS
DIASTOLIC BLOOD PRESSURE: 78 MMHG | BODY MASS INDEX: 23.85 KG/M2 | HEART RATE: 76 BPM | SYSTOLIC BLOOD PRESSURE: 115 MMHG | HEIGHT: 62 IN | WEIGHT: 129.6 LBS

## 2024-10-17 DIAGNOSIS — Z86.19 HISTORY OF CHLAMYDIA: Primary | ICD-10-CM

## 2024-10-17 DIAGNOSIS — Z11.3 SCREEN FOR STD (SEXUALLY TRANSMITTED DISEASE): ICD-10-CM

## 2024-10-17 LAB — TREPONEMA PALLIDUM IGG+IGM AB [PRESENCE] IN SERUM OR PLASMA BY IMMUNOASSAY: NORMAL

## 2024-10-17 PROCEDURE — 87491 CHLMYD TRACH DNA AMP PROBE: CPT | Performed by: NURSE PRACTITIONER

## 2024-10-17 PROCEDURE — 86780 TREPONEMA PALLIDUM: CPT

## 2024-10-17 PROCEDURE — 36415 COLL VENOUS BLD VENIPUNCTURE: CPT

## 2024-10-17 PROCEDURE — 87521 HEPATITIS C PROBE&RVRS TRNSC: CPT

## 2024-10-17 PROCEDURE — 87591 N.GONORRHOEAE DNA AMP PROB: CPT | Performed by: NURSE PRACTITIONER

## 2024-10-17 PROCEDURE — 87389 HIV-1 AG W/HIV-1&-2 AB AG IA: CPT

## 2024-10-17 PROCEDURE — 87522 HEPATITIS C REVRS TRNSCRPJ: CPT

## 2024-10-17 PROCEDURE — 99213 OFFICE O/P EST LOW 20 MIN: CPT | Performed by: NURSE PRACTITIONER

## 2024-10-17 NOTE — PROGRESS NOTES
"Amanda Muse presents today for test of cure from chlamydial infection which was treated with Azithromycin on 9/18/2024.  She reports that her sexual partner was also treated and that they abstained from sex for 2 weeks after treatment.  She has used condoms with sex since then.  Denies vaginal discharge, odor, or irritation/itching.  Denies pelvic pain.    /78 (BP Location: Left arm, Patient Position: Sitting)   Pulse 76   Ht 5' 2\" (1.575 m)   Wt 58.8 kg (129 lb 9.6 oz)   LMP 09/25/2024 (Approximate)   BMI 23.70 kg/m²     GC/CT culture obtained now.  Return as previously scheduled for annual GYN exam in 6/2025.  "

## 2024-10-18 LAB
C TRACH DNA SPEC QL NAA+PROBE: NEGATIVE
HCV RNA SERPL NAA+PROBE-ACNC: NOT DETECTED K[IU]/ML
HIV 1+2 AB+HIV1 P24 AG SERPL QL IA: NORMAL
HIV 2 AB SERPL QL IA: NORMAL
HIV1 AB SERPL QL IA: NORMAL
HIV1 P24 AG SERPL QL IA: NORMAL
N GONORRHOEA DNA SPEC QL NAA+PROBE: NEGATIVE

## 2024-11-21 ENCOUNTER — OFFICE VISIT (OUTPATIENT)
Dept: OBGYN CLINIC | Facility: CLINIC | Age: 24
End: 2024-11-21

## 2024-11-21 VITALS
BODY MASS INDEX: 23.63 KG/M2 | WEIGHT: 128.4 LBS | SYSTOLIC BLOOD PRESSURE: 106 MMHG | HEART RATE: 71 BPM | HEIGHT: 62 IN | DIASTOLIC BLOOD PRESSURE: 73 MMHG

## 2024-11-21 DIAGNOSIS — R39.9 UTI SYMPTOMS: Primary | ICD-10-CM

## 2024-11-21 DIAGNOSIS — N30.01 ACUTE CYSTITIS WITH HEMATURIA: ICD-10-CM

## 2024-11-21 LAB
SL AMB  POCT GLUCOSE, UA: ABNORMAL
SL AMB LEUKOCYTE ESTERASE,UA: ABNORMAL
SL AMB POCT BILIRUBIN,UA: ABNORMAL
SL AMB POCT BLOOD,UA: ABNORMAL
SL AMB POCT CLARITY,UA: ABNORMAL
SL AMB POCT COLOR,UA: YELLOW
SL AMB POCT KETONES,UA: ABNORMAL
SL AMB POCT NITRITE,UA: ABNORMAL
SL AMB POCT PH,UA: 6
SL AMB POCT SPECIFIC GRAVITY,UA: 1.02
SL AMB POCT URINE PROTEIN: ABNORMAL
SL AMB POCT UROBILINOGEN: ABNORMAL

## 2024-11-21 PROCEDURE — 81003 URINALYSIS AUTO W/O SCOPE: CPT | Performed by: NURSE PRACTITIONER

## 2024-11-21 PROCEDURE — 99213 OFFICE O/P EST LOW 20 MIN: CPT | Performed by: NURSE PRACTITIONER

## 2024-11-21 PROCEDURE — 87086 URINE CULTURE/COLONY COUNT: CPT | Performed by: NURSE PRACTITIONER

## 2024-11-21 RX ORDER — NITROFURANTOIN 25; 75 MG/1; MG/1
100 CAPSULE ORAL 2 TIMES DAILY
Qty: 10 CAPSULE | Refills: 0 | Status: SHIPPED | OUTPATIENT
Start: 2024-11-21 | End: 2024-11-26

## 2024-11-21 NOTE — PROGRESS NOTES
PROBLEM GYNECOLOGICAL VISIT    Amanda Muse is a 24 y.o. female who presents today with complaint of urinary frequency and dysuria.  Her general medical history has been reviewed and she reports it as follows:    Past Medical History:   Diagnosis Date    ADHD (attention deficit hyperactivity disorder)     Anxiety     Chlamydia     2024    Migraine     OCD (obsessive compulsive disorder)      Past Surgical History:   Procedure Laterality Date    NO PAST SURGERIES       OB History          0    Para   0    Term   0       0    AB   0    Living   0         SAB   0    IAB   0    Ectopic   0    Multiple   0    Live Births   0               Social History     Tobacco Use    Smoking status: Former     Types: Pipe     Passive exposure: Never    Smokeless tobacco: Never    Tobacco comments:     hookah couple times/month   Vaping Use    Vaping status: Never Used   Substance Use Topics    Alcohol use: Yes     Comment: couple times/month    Drug use: Not Currently     Types: Marijuana     Comment: last used marijuana      Social History     Substance and Sexual Activity   Sexual Activity Yes    Partners: Male    Birth control/protection: OCP       Current Outpatient Medications   Medication Instructions    azelastine (ASTELIN) 0.1 % nasal spray 1 spray, Nasal, 2 times daily, Use in each nostril as directed    cetirizine (ZYRTEC) 10 mg, Oral, Daily    Joellen 0.25-35 MG-MCG per tablet 1 tablet, Oral, Daily    rizatriptan (MAXALT) 10 mg, Oral, Once as needed, May repeat in 2 hours if needed. Max 2/24 hours, 9/month.       History of Present Illness:   Reports for the past 2 days she has been experiencing urinary frequency and dysuria.  Denies vaginal discharge, odor, or itching/irritation.  Denies fever or flank pain.    Review of Systems:  Review of Systems   Constitutional: Negative.  Negative for fever.   Gastrointestinal: Negative.    Genitourinary:  Positive for dysuria and frequency. Negative for  "pelvic pain, vaginal discharge and vaginal pain.       Physical Exam:  /73 (BP Location: Right arm, Patient Position: Sitting)   Pulse 71   Ht 5' 2\" (1.575 m)   Wt 58.2 kg (128 lb 6.4 oz)   LMP 10/28/2024 (Approximate)   BMI 23.48 kg/m²   Physical Exam  Constitutional:       General: She is not in acute distress.  Abdominal:      Palpations: Abdomen is soft.      Tenderness: There is no abdominal tenderness. There is no right CVA tenderness or left CVA tenderness.   Neurological:      Mental Status: She is alert.   Skin:     General: Skin is warm and dry.   Vitals reviewed.       Point of Care Testing:   -urine dipstick: neg leuks, neg nitrites, trace blood    Assessment:   1. Suspect UTI.    Plan:   1. Cultures ordered: urine.   2. Given Rx Macrobid.   3. Return to office as previously scheduled.    Reviewed with patient that [facility] immediately, but that they will not necessarily be reviewed by me immediately.  Explained that I will review results at my earliest opportunity and contact patient appropriately.  "

## 2024-11-23 LAB — BACTERIA UR CULT: NORMAL

## 2025-02-25 ENCOUNTER — OFFICE VISIT (OUTPATIENT)
Dept: OBGYN CLINIC | Facility: CLINIC | Age: 25
End: 2025-02-25

## 2025-02-25 VITALS
DIASTOLIC BLOOD PRESSURE: 70 MMHG | HEIGHT: 62 IN | SYSTOLIC BLOOD PRESSURE: 128 MMHG | BODY MASS INDEX: 22.41 KG/M2 | WEIGHT: 121.8 LBS

## 2025-02-25 DIAGNOSIS — Z11.3 SCREEN FOR STD (SEXUALLY TRANSMITTED DISEASE): ICD-10-CM

## 2025-02-25 DIAGNOSIS — Z30.41 USES ORAL CONTRACEPTION: ICD-10-CM

## 2025-02-25 DIAGNOSIS — R35.0 URINARY FREQUENCY: ICD-10-CM

## 2025-02-25 DIAGNOSIS — N89.8 VAGINAL DISCHARGE: Primary | ICD-10-CM

## 2025-02-25 DIAGNOSIS — B37.9 YEAST INFECTION: ICD-10-CM

## 2025-02-25 DIAGNOSIS — N89.8 VAGINAL ITCHING: ICD-10-CM

## 2025-02-25 DIAGNOSIS — Z30.41 ENCOUNTER FOR SURVEILLANCE OF CONTRACEPTIVE PILLS: ICD-10-CM

## 2025-02-25 PROCEDURE — 99213 OFFICE O/P EST LOW 20 MIN: CPT | Performed by: OBSTETRICS & GYNECOLOGY

## 2025-02-25 PROCEDURE — 87077 CULTURE AEROBIC IDENTIFY: CPT

## 2025-02-25 PROCEDURE — 87210 SMEAR WET MOUNT SALINE/INK: CPT | Performed by: OBSTETRICS & GYNECOLOGY

## 2025-02-25 PROCEDURE — 87186 SC STD MICRODIL/AGAR DIL: CPT

## 2025-02-25 PROCEDURE — 87591 N.GONORRHOEAE DNA AMP PROB: CPT

## 2025-02-25 PROCEDURE — 87086 URINE CULTURE/COLONY COUNT: CPT

## 2025-02-25 PROCEDURE — 87491 CHLMYD TRACH DNA AMP PROBE: CPT

## 2025-02-25 RX ORDER — FLUCONAZOLE 150 MG/1
150 TABLET ORAL ONCE
Qty: 1 TABLET | Refills: 0 | Status: SHIPPED | OUTPATIENT
Start: 2025-02-25 | End: 2025-02-25

## 2025-02-25 RX ORDER — NORGESTIMATE AND ETHINYL ESTRADIOL 0.25-0.035
1 KIT ORAL DAILY
Qty: 84 TABLET | Refills: 4 | Status: SHIPPED | OUTPATIENT
Start: 2025-02-25

## 2025-02-25 NOTE — PROGRESS NOTES
"OB/GYN VISIT  Amanda Muse  2025  5:43 PM    ASSESSMENT / PLAN:    Amanda Muse is a 24 y.o.  female presenting for vaginal discharge and vaginal itching. Microscopy positive for elevated pH and yeast. Diflucan prescribed - pt states she cannot tolerate placing monistat in her vaginal given current discomfort. OCPs refilled. RTC in  for annual visit - this is scheduled. She will be due for her pap at that time. Follow up GC/Ct testing. UA discarded prior to read. Ucx sent.       SUBJECTIVE:    Amanda Muse is a 24 y.o.  female presenting for vaginal discharge and itching. Her symptoms started a few days ago after going to a water park. She had a tampon in at the time and the water had a very strong chemical scent. She has had itching, discomfort, and thick white discharge since. LMP 25. Patient denies dysuria and hematuria and states this doesn't feel like UTI's she has had in the past. She denies fevers, chill, body aches. She is currently on OCPs and is happy with them. She states she needs a refill. She said she sent a request on MyChart but has not heard back yet.     Past Medical History:   Diagnosis Date    ADHD (attention deficit hyperactivity disorder)     Anxiety     Chlamydia     2024    Migraine     OCD (obsessive compulsive disorder)        Past Surgical History:   Procedure Laterality Date    NO PAST SURGERIES         OBJECTIVE:    Vitals:  Blood pressure 128/70, height 5' 2\" (1.575 m), weight 55.2 kg (121 lb 12.8 oz), last menstrual period 2025, not currently breastfeeding.Body mass index is 22.28 kg/m².    Physical Exam:    Physical Exam  Constitutional:       General: She is not in acute distress.     Appearance: Normal appearance. She is not ill-appearing.   Genitourinary:      Genitourinary Comments: Normal appearing external female genitalia without lesion or laceration. Vaginal irritation noted. Thick white discharge present on speculum exam. No " cervical lesions or lacerations. Mild dark red bleeding.     Microscopy notable for hyphae    HENT:      Head: Normocephalic and atraumatic.      Mouth/Throat:      Mouth: Mucous membranes are moist. No oral lesions.   Eyes:      General: No scleral icterus.     Extraocular Movements: Extraocular movements intact.   Cardiovascular:      Rate and Rhythm: Normal rate and regular rhythm.      Pulses: Normal pulses.   Pulmonary:      Effort: Pulmonary effort is normal. No respiratory distress.   Abdominal:      Palpations: Abdomen is soft.      Tenderness: There is no abdominal tenderness.   Musculoskeletal:      Right lower leg: No edema.      Left lower leg: No edema.   Neurological:      General: No focal deficit present.      Mental Status: She is alert.   Skin:     General: Skin is warm and dry.   Psychiatric:         Attention and Perception: Attention normal.         Mood and Affect: Mood normal.         Speech: Speech normal.         Behavior: Behavior normal.         Thought Content: Thought content normal.         Judgment: Judgment normal.   Vitals reviewed.               Latasha Shanks MD  2/25/2025  5:43 PM

## 2025-02-26 LAB
C TRACH DNA SPEC QL NAA+PROBE: NEGATIVE
N GONORRHOEA DNA SPEC QL NAA+PROBE: NEGATIVE

## 2025-02-28 ENCOUNTER — HOSPITAL ENCOUNTER (EMERGENCY)
Facility: HOSPITAL | Age: 25
Discharge: HOME | End: 2025-02-28
Attending: EMERGENCY MEDICINE

## 2025-02-28 VITALS
SYSTOLIC BLOOD PRESSURE: 122 MMHG | BODY MASS INDEX: 21.9 KG/M2 | RESPIRATION RATE: 16 BRPM | OXYGEN SATURATION: 100 % | TEMPERATURE: 98.1 F | DIASTOLIC BLOOD PRESSURE: 75 MMHG | WEIGHT: 119.71 LBS | HEART RATE: 76 BPM

## 2025-02-28 DIAGNOSIS — B37.9 YEAST INFECTION: Primary | ICD-10-CM

## 2025-02-28 LAB — BACTERIA UR CULT: ABNORMAL

## 2025-02-28 PROCEDURE — 99284 EMERGENCY DEPT VISIT MOD MDM: CPT | Performed by: PHYSICIAN ASSISTANT

## 2025-02-28 PROCEDURE — 99283 EMERGENCY DEPT VISIT LOW MDM: CPT

## 2025-02-28 RX ORDER — MICONAZOLE NITRATE 2 %
1 CREAM WITH APPLICATOR VAGINAL
Qty: 45 G | Refills: 0 | Status: SHIPPED | OUTPATIENT
Start: 2025-02-28 | End: 2025-02-28

## 2025-02-28 RX ORDER — MICONAZOLE NITRATE 2 %
1 CREAM WITH APPLICATOR VAGINAL
Qty: 45 G | Refills: 0 | Status: SHIPPED | OUTPATIENT
Start: 2025-02-28 | End: 2025-03-07

## 2025-03-01 NOTE — ED PROVIDER NOTES
Time reflects when diagnosis was documented in both MDM as applicable and the Disposition within this note       Time User Action Codes Description Comment    2/28/2025  9:02 PM Sahra Humphrey [B37.9] Yeast infection           ED Disposition       ED Disposition   Discharge    Condition   Stable    Date/Time   Fri Feb 28, 2025  9:02 PM    Comment   Amanda Muse discharge to home/self care.                   Assessment & Plan       Medical Decision Making  24y.o female presents to the ER for vaginal itching for 6 days. Vitals are stable. Patient is in no acute distress. On exam, breathing is non-labored. No tachypnea or accessory muscle use. Heart is regular rate. Abdomen is not distended. No labial irritation seen. No erythema, swelling or discharge seen on exam. DDX consists of but not limited to: yeast infection, vaginal irritation. Patient recently was treated for a yeast infection. Her discharge did improve but she continues to have vaginal itching. Will give a course of Monistat for symptoms and have her follow up with her obgyn again.    The management plan was discussed in detail with the patient at bedside and all questions were answered.  Prior to discharge, we provided both verbal and written instructions.  We discussed with the patient the signs and symptoms for which to return to the emergency department.  All questions were answered and patient was comfortable with the plan of care and discharged to home.  Instructed the patient to follow up with the primary care provider and/or specialist provided and their written instructions.  The patient verbalized understanding of our discussion and plan of care, and agrees to return to the Emergency Department for concerns and progression of illness.    At discharge, I instructed the patient to:  -follow up with pcp  -apply Monistat as prescribed  -follow up with obgyn  -return to the ER if symptoms worsened or new symptoms arose  Patient agreed to this  plan and was stable at time of discharge.     Problems Addressed:  Yeast infection: acute illness or injury    Amount and/or Complexity of Data Reviewed  Independent Historian:      Details: Patient is historian  External Data Reviewed: notes.     Details: Notes from OBGYN reviewed.    Risk  OTC drugs.             Medications - No data to display    ED Risk Strat Scores                                                History of Present Illness       Chief Complaint   Patient presents with    Vaginal Itching     Pt reports dx with yeast infection on Sunday, prescribed Fluconazole and told should have improvement in 3 days. Pt states itching remains intense, may have cut herself from the scratching, and now c/o burning with urination.        Past Medical History:   Diagnosis Date    ADHD (attention deficit hyperactivity disorder)     Anxiety     Chlamydia     2019, 2024    Migraine     OCD (obsessive compulsive disorder)       Past Surgical History:   Procedure Laterality Date    NO PAST SURGERIES        Family History   Problem Relation Age of Onset    Migraines Mother     ADD / ADHD Father     Heart disease Maternal Grandmother     Diabetes Maternal Grandmother     Breast cancer Neg Hx     Colon cancer Neg Hx     Ovarian cancer Neg Hx     Cancer Neg Hx       Social History     Tobacco Use    Smoking status: Former     Types: Pipe     Passive exposure: Never    Smokeless tobacco: Never    Tobacco comments:     hookah couple times/month   Vaping Use    Vaping status: Never Used   Substance Use Topics    Alcohol use: Yes     Comment: couple times/month    Drug use: Not Currently     Types: Marijuana     Comment: last used marijuana 2020      E-Cigarette/Vaping    E-Cigarette Use Never User       E-Cigarette/Vaping Substances    Nicotine No     THC No     CBD No     Flavoring No     Other No     Unknown No       I have reviewed and agree with the history as documented.     24y.o female with PMH of ADHD, anxiety,  chlamydia, migraine and OCD presents to the ER for vaginal itching. Patient states she began with symptoms 6 days ago. She was seen by her obgyn 3 days ago and diagnosed with a yeast infection. She was given 1 tablet of Diflucan, which she took. She reports her discharge has improved but she continues to have itching. Symptoms are constant. She denies fever, chills, URI symptoms, chest pain, dyspnea, N/V/D, abdominal pain, urinary symptoms, weakness or paresthesias. Patient states she is scratching so much that she believes she is causing abrasions to her labia. She reports her labias burn when she urinates. She denies pain from her actual urethra.      History provided by:  Patient   used: No        Review of Systems   Constitutional:  Negative for activity change, appetite change, chills and fever.   HENT:  Negative for congestion, drooling, ear discharge, ear pain, facial swelling, rhinorrhea and sore throat.    Eyes:  Negative for redness.   Respiratory:  Negative for cough and shortness of breath.    Cardiovascular:  Negative for chest pain.   Gastrointestinal:  Negative for abdominal pain, diarrhea, nausea and vomiting.   Genitourinary:  Negative for dysuria, frequency, hematuria, urgency and vaginal discharge.   Musculoskeletal:  Negative for neck stiffness.   Skin:  Negative for rash.   Allergic/Immunologic: Negative for food allergies.   Neurological:  Negative for weakness and numbness.           Objective       ED Triage Vitals [02/28/25 1935]   Temperature Pulse Blood Pressure Respirations SpO2 Patient Position - Orthostatic VS   98.1 °F (36.7 °C) 76 122/75 16 100 % Sitting      Temp Source Heart Rate Source BP Location FiO2 (%) Pain Score    Oral Monitor Right arm -- --      Vitals      Date and Time Temp Pulse SpO2 Resp BP Pain Score FACES Pain Rating User   02/28/25 1935 98.1 °F (36.7 °C) 76 100 % 16 122/75 -- -- ES            Physical Exam  Vitals and nursing note reviewed. Exam  conducted with a chaperone present (Deana balderas).   Constitutional:       General: She is not in acute distress.     Appearance: She is not toxic-appearing.   HENT:      Head: Normocephalic and atraumatic.      Mouth/Throat:      Lips: Pink. No lesions.      Mouth: Mucous membranes are moist.   Eyes:      Conjunctiva/sclera: Conjunctivae normal.   Neck:      Trachea: No tracheal deviation.   Cardiovascular:      Rate and Rhythm: Normal rate.   Pulmonary:      Effort: Pulmonary effort is normal. No respiratory distress.   Abdominal:      General: There is no distension.   Genitourinary:     Exam position: Supine.      Pubic Area: No rash.       Labia:         Right: No rash, tenderness or injury.         Left: No rash, tenderness or injury.    Musculoskeletal:      Cervical back: Normal range of motion and neck supple.   Skin:     General: Skin is warm and dry.      Findings: No rash.   Neurological:      Mental Status: She is alert.      GCS: GCS eye subscore is 4. GCS verbal subscore is 5. GCS motor subscore is 6.   Psychiatric:         Mood and Affect: Mood normal.         Results Reviewed       None            No orders to display       Procedures    ED Medication and Procedure Management   Prior to Admission Medications   Prescriptions Last Dose Informant Patient Reported? Taking?   azelastine (ASTELIN) 0.1 % nasal spray  Self No No   Si spray into each nostril 2 (two) times a day Use in each nostril as directed   cetirizine (ZyrTEC) 10 mg tablet  Self No No   Sig: Take 1 tablet (10 mg total) by mouth daily   norgestimate-ethinyl estradiol (Joellen) 0.25-35 MG-MCG per tablet   No No   Sig: Take 1 tablet by mouth daily   rizatriptan (MAXALT) 10 mg tablet   No No   Sig: Take 1 tablet (10 mg total) by mouth once as needed for migraine May repeat in 2 hours if needed. Max 2/24 hours, 9/month.      Facility-Administered Medications: None     Discharge Medication List as of 2025  9:05 PM        START taking  these medications    Details   miconazole (MONISTAT-7) 2 % vaginal cream Insert 1 applicator into the vagina daily at bedtime for 7 days, Starting Fri 2/28/2025, Until Fri 3/7/2025, Normal           CONTINUE these medications which have NOT CHANGED    Details   azelastine (ASTELIN) 0.1 % nasal spray 1 spray into each nostril 2 (two) times a day Use in each nostril as directed, Starting Tue 2/20/2024, Normal      cetirizine (ZyrTEC) 10 mg tablet Take 1 tablet (10 mg total) by mouth daily, Starting Tue 2/20/2024, Normal      norgestimate-ethinyl estradiol (Joellen) 0.25-35 MG-MCG per tablet Take 1 tablet by mouth daily, Starting Tue 2/25/2025, Normal      rizatriptan (MAXALT) 10 mg tablet Take 1 tablet (10 mg total) by mouth once as needed for migraine May repeat in 2 hours if needed. Max 2/24 hours, 9/month., Starting Wed 7/3/2024, Normal           No discharge procedures on file.  ED SEPSIS DOCUMENTATION   Time reflects when diagnosis was documented in both MDM as applicable and the Disposition within this note       Time User Action Codes Description Comment    2/28/2025  9:02 PM Sahra Humphrey Add [B37.9] Yeast infection                  Sahra Humphrey PA-C  02/28/25 2610

## 2025-03-01 NOTE — DISCHARGE INSTRUCTIONS
DISCHARGE INSTRUCTIONS:    FOLLOW UP WITH YOUR PRIMARY CARE PROVIDER OR THE [facility]. MAKE AN APPOINTMENT TO BE SEEN.     APPLY MONISTAT AS PRESCRIBED. IF RASH, SHORTNESS OF BREATH OR TROUBLE SWALLOWING, STOP TAKING THE MEDICATION AND BE SEEN.     FOLLOW UP WITH OBGYN.    IF SYMPTOMS WORSEN OR NEW SYMPTOMS ARISE, RETURN TO THE ER TO BE SEEN.

## 2025-05-07 ENCOUNTER — OFFICE VISIT (OUTPATIENT)
Dept: URGENT CARE | Age: 25
End: 2025-05-07

## 2025-05-07 VITALS
HEART RATE: 76 BPM | OXYGEN SATURATION: 100 % | DIASTOLIC BLOOD PRESSURE: 57 MMHG | TEMPERATURE: 98.6 F | SYSTOLIC BLOOD PRESSURE: 108 MMHG | RESPIRATION RATE: 20 BRPM

## 2025-05-07 DIAGNOSIS — N30.01 ACUTE CYSTITIS WITH HEMATURIA: Primary | ICD-10-CM

## 2025-05-07 DIAGNOSIS — B37.31 VAGINAL YEAST INFECTION: ICD-10-CM

## 2025-05-07 DIAGNOSIS — R35.0 URINARY FREQUENCY: ICD-10-CM

## 2025-05-07 LAB
SL AMB  POCT GLUCOSE, UA: NEGATIVE
SL AMB LEUKOCYTE ESTERASE,UA: ABNORMAL
SL AMB POCT BILIRUBIN,UA: NEGATIVE
SL AMB POCT BLOOD,UA: ABNORMAL
SL AMB POCT CLARITY,UA: ABNORMAL
SL AMB POCT COLOR,UA: YELLOW
SL AMB POCT KETONES,UA: NEGATIVE
SL AMB POCT NITRITE,UA: NEGATIVE
SL AMB POCT PH,UA: 6
SL AMB POCT SPECIFIC GRAVITY,UA: 1.01
SL AMB POCT URINE HCG: NEGATIVE
SL AMB POCT URINE PROTEIN: NEGATIVE
SL AMB POCT UROBILINOGEN: 0.2

## 2025-05-07 PROCEDURE — 99213 OFFICE O/P EST LOW 20 MIN: CPT

## 2025-05-07 PROCEDURE — 81025 URINE PREGNANCY TEST: CPT

## 2025-05-07 PROCEDURE — 81002 URINALYSIS NONAUTO W/O SCOPE: CPT

## 2025-05-07 PROCEDURE — 87591 N.GONORRHOEAE DNA AMP PROB: CPT

## 2025-05-07 PROCEDURE — 87491 CHLMYD TRACH DNA AMP PROBE: CPT

## 2025-05-07 PROCEDURE — 87086 URINE CULTURE/COLONY COUNT: CPT

## 2025-05-07 RX ORDER — FLUOXETINE 20 MG/5ML
20 SOLUTION ORAL DAILY
COMMUNITY
Start: 2025-02-06

## 2025-05-07 RX ORDER — FLUCONAZOLE 150 MG/1
150 TABLET ORAL ONCE
Qty: 1 TABLET | Refills: 0 | Status: SHIPPED | OUTPATIENT
Start: 2025-05-07 | End: 2025-05-07

## 2025-05-07 RX ORDER — CEPHALEXIN 500 MG/1
500 CAPSULE ORAL EVERY 12 HOURS SCHEDULED
Qty: 14 CAPSULE | Refills: 0 | Status: SHIPPED | OUTPATIENT
Start: 2025-05-07 | End: 2025-05-14

## 2025-05-07 NOTE — PROGRESS NOTES
[facility]        NAME: Amanda Muse is a 25 y.o. female  : 2000    MRN: [id number]  DATE: May 7, 2025  TIME: 4:21 PM    Assessment and Plan   Acute cystitis with hematuria [N30.01]  1. Acute cystitis with hematuria  Urine culture    Urine culture    POCT urine HCG    cephalexin (KEFLEX) 500 mg capsule      2. Vaginal yeast infection  fluconazole (DIFLUCAN) 150 mg tablet      3. Urinary frequency  POCT urine dip    POCT urine HCG    Chlamydia/GC amplified DNA by PCR        Urine dip completed showing large leuks and moderate blood. Will be sent for culture. Antibiotics prescribed. Will follow up on urine culture results and change antibiotic as needed. Patient would like to hold off on treatment for GC/chlamydia until results finalized. Also prescribed fluconazole to cover vaginal candidiasis. Patient encouraged to follow-up with GYN, she acknowledges.      Patient Instructions     Take antibiotic as prescribed - take the entire course of medication even if you start feeling better. Future infections can be difficult to treat if you do not take all of the pills.    Urine culture sent, we will notify you if any changes need to be made to your medication.    If on birth control pills - antibiotics can decrease the effectiveness of birth control medications, use a back-up method during medication use and for 3-5 days afterwards.     You are encouraged to try over-the-counter Azo for symptom relief.     Stay hydrated with water to help flush out bacteria. Avoid bladder irritants such as citrus, caffeine, chocolate, and coffee.     Follow up with your PCP in 3-5 days.    Go to the ER if symptoms worsen.     If tests are performed, our office will contact you with results only if changes need to made to the care plan discussed with you at the visit. [facility].      Chief Complaint     Chief Complaint   Patient presents with    Possible UTI     Started 3-4  days ago with worsening vaginal itching, discharge, cloudy urine, urinary frequency.  Denies hematuria.  Applied Monastat cream without relief.           History of Present Illness       25-year-old female presenting with vaginal discharge and cloudy urine x 2-4 days.  Patient reports vaginal itching, urinary frequency/urgency, and dysuria.  Denies fever/chills, abdominal pain, pelvic pain, nausea/vomiting, back/flank pain, and obvious hematuria.  Reports history of vaginal yeast infection in February and states symptoms feel similar.  She is also concerned for UTI.  Denies concern for STI as she has been with same partner for the past one year.  When asked about GC chlamydia testing would like to proceed however wants to hold off on treatment until lab results obtained.  LMP three weeks ago.  No relief with OTC Monistat.        Review of Systems   Review of Systems   Constitutional:  Negative for chills and fever.   Respiratory:  Negative for shortness of breath.    Cardiovascular:  Negative for chest pain.   Gastrointestinal:  Negative for abdominal pain, diarrhea, nausea and vomiting.   Genitourinary:  Positive for dysuria, frequency, urgency and vaginal discharge. Negative for flank pain, genital sores, hematuria and pelvic pain.        Pruritus, cloudy urine   Musculoskeletal:  Negative for back pain and myalgias.         Current Medications       Current Outpatient Medications:     azelastine (ASTELIN) 0.1 % nasal spray, 1 spray into each nostril 2 (two) times a day Use in each nostril as directed, Disp: 30 mL, Rfl: 2    cephalexin (KEFLEX) 500 mg capsule, Take 1 capsule (500 mg total) by mouth every 12 (twelve) hours for 7 days, Disp: 14 capsule, Rfl: 0    cetirizine (ZyrTEC) 10 mg tablet, Take 1 tablet (10 mg total) by mouth daily, Disp: 90 tablet, Rfl: 1    fluconazole (DIFLUCAN) 150 mg tablet, Take 1 tablet (150 mg total) by mouth once for 1 dose, Disp: 1 tablet, Rfl: 0    FLUoxetine (PROzac) 20 mg/5 mL  solution, Take 20 mg by mouth daily, Disp: , Rfl:     norgestimate-ethinyl estradiol (Joellen) 0.25-35 MG-MCG per tablet, Take 1 tablet by mouth daily, Disp: 84 tablet, Rfl: 4    rizatriptan (MAXALT) 10 mg tablet, Take 1 tablet (10 mg total) by mouth once as needed for migraine May repeat in 2 hours if needed. Max 2/24 hours, 9/month., Disp: 9 tablet, Rfl: 12    Current Allergies     Allergies as of 05/07/2025 - Reviewed 05/07/2025   Allergen Reaction Noted    Dov flavor - food allergy Lip Swelling 09/17/2024            The following portions of the patient's history were reviewed and updated as appropriate: allergies, current medications, past family history, past medical history, past social history, past surgical history and problem list.     Past Medical History:   Diagnosis Date    ADHD (attention deficit hyperactivity disorder)     Anxiety     Chlamydia     2019, 2024    Migraine     OCD (obsessive compulsive disorder)        Past Surgical History:   Procedure Laterality Date    NO PAST SURGERIES         Family History   Problem Relation Age of Onset    Migraines Mother     ADD / ADHD Father     Heart disease Maternal Grandmother     Diabetes Maternal Grandmother     Breast cancer Neg Hx     Colon cancer Neg Hx     Ovarian cancer Neg Hx     Cancer Neg Hx          Medications have been verified.        Objective   /57   Pulse 76   Temp 98.6 °F (37 °C)   Resp 20   LMP 04/24/2025   SpO2 100%        Physical Exam     Physical Exam  Vitals and nursing note reviewed.   Constitutional:       General: She is not in acute distress.     Appearance: She is not ill-appearing.   HENT:      Head: Normocephalic and atraumatic.      Mouth/Throat:      Mouth: Mucous membranes are moist.   Cardiovascular:      Rate and Rhythm: Normal rate and regular rhythm.      Pulses: Normal pulses.      Heart sounds: Normal heart sounds.   Pulmonary:      Effort: Pulmonary effort is normal.      Breath sounds: Normal breath  sounds.   Abdominal:      General: Bowel sounds are normal. There is no distension.      Palpations: Abdomen is soft.      Tenderness: There is no abdominal tenderness. There is no right CVA tenderness or left CVA tenderness.   Musculoskeletal:         General: Normal range of motion.      Cervical back: Normal range of motion and neck supple.   Skin:     General: Skin is warm and dry.   Neurological:      Mental Status: She is alert and oriented to person, place, and time.

## 2025-05-08 LAB
BACTERIA UR CULT: NORMAL
C TRACH DNA SPEC QL NAA+PROBE: NEGATIVE
N GONORRHOEA DNA SPEC QL NAA+PROBE: NEGATIVE

## 2025-05-18 DIAGNOSIS — G43.009 MIGRAINE WITHOUT AURA AND WITHOUT STATUS MIGRAINOSUS, NOT INTRACTABLE: ICD-10-CM

## 2025-05-19 RX ORDER — RIZATRIPTAN BENZOATE 10 MG/1
10 TABLET ORAL ONCE AS NEEDED
Qty: 9 TABLET | Refills: 0 | Status: SHIPPED | OUTPATIENT
Start: 2025-05-19

## 2025-05-19 NOTE — TELEPHONE ENCOUNTER
Patient needs an appointment. Please contact the patient to schedule an appointment. Last office visit: 7/3/24    14-Dec-2022

## 2025-05-21 NOTE — TELEPHONE ENCOUNTER
05/21/25    FIRST AND LAST CALL:   Called Patient.  No Answer.   Left Message.    If patient contacts office, please assist with scheduling a f/u appt and add appt to the waiting list.     LOV: 07/03/24 (Return in about 6 months (around 1/3/2025).       REASON APPT BEING REQUESTED:  Continuation of Medication Refill.    Thank You

## 2025-06-05 ENCOUNTER — ANNUAL EXAM (OUTPATIENT)
Dept: OBGYN CLINIC | Facility: CLINIC | Age: 25
End: 2025-06-05

## 2025-06-05 ENCOUNTER — APPOINTMENT (OUTPATIENT)
Dept: LAB | Facility: HOSPITAL | Age: 25
End: 2025-06-05

## 2025-06-05 VITALS — BODY MASS INDEX: 21.95 KG/M2 | DIASTOLIC BLOOD PRESSURE: 72 MMHG | SYSTOLIC BLOOD PRESSURE: 110 MMHG | WEIGHT: 120 LBS

## 2025-06-05 DIAGNOSIS — Z11.3 SCREEN FOR STD (SEXUALLY TRANSMITTED DISEASE): ICD-10-CM

## 2025-06-05 DIAGNOSIS — Z12.39 ENCOUNTER FOR BREAST CANCER SCREENING USING NON-MAMMOGRAM MODALITY: ICD-10-CM

## 2025-06-05 DIAGNOSIS — Z01.419 ENCOUNTER FOR GYNECOLOGICAL EXAMINATION WITHOUT ABNORMAL FINDING: Primary | ICD-10-CM

## 2025-06-05 DIAGNOSIS — Z12.4 SCREENING FOR CERVICAL CANCER: ICD-10-CM

## 2025-06-05 DIAGNOSIS — Z30.09 UNWANTED FERTILITY: ICD-10-CM

## 2025-06-05 LAB
HIV 1+2 AB+HIV1 P24 AG SERPL QL IA: NORMAL
HIV1 P24 AG SER QL: NORMAL

## 2025-06-05 PROCEDURE — 99395 PREV VISIT EST AGE 18-39: CPT | Performed by: NURSE PRACTITIONER

## 2025-06-05 PROCEDURE — 87563 M. GENITALIUM AMP PROBE: CPT | Performed by: NURSE PRACTITIONER

## 2025-06-05 PROCEDURE — 87806 HIV AG W/HIV1&2 ANTB W/OPTIC: CPT

## 2025-06-05 PROCEDURE — 87591 N.GONORRHOEAE DNA AMP PROB: CPT | Performed by: NURSE PRACTITIONER

## 2025-06-05 PROCEDURE — 36415 COLL VENOUS BLD VENIPUNCTURE: CPT

## 2025-06-05 PROCEDURE — 87491 CHLMYD TRACH DNA AMP PROBE: CPT | Performed by: NURSE PRACTITIONER

## 2025-06-05 PROCEDURE — 86803 HEPATITIS C AB TEST: CPT

## 2025-06-05 PROCEDURE — G0145 SCR C/V CYTO,THINLAYER,RESCR: HCPCS | Performed by: NURSE PRACTITIONER

## 2025-06-05 PROCEDURE — 86780 TREPONEMA PALLIDUM: CPT

## 2025-06-05 PROCEDURE — 87340 HEPATITIS B SURFACE AG IA: CPT

## 2025-06-05 PROCEDURE — 87661 TRICHOMONAS VAGINALIS AMPLIF: CPT | Performed by: NURSE PRACTITIONER

## 2025-06-05 PROCEDURE — 87389 HIV-1 AG W/HIV-1&-2 AB AG IA: CPT

## 2025-06-05 RX ORDER — NORGESTIMATE AND ETHINYL ESTRADIOL 0.25-0.035
1 KIT ORAL DAILY
Qty: 84 TABLET | Refills: 4 | Status: SHIPPED | OUTPATIENT
Start: 2025-06-05

## 2025-06-05 NOTE — PROGRESS NOTES
ANNUAL GYNECOLOGICAL EXAMINATION    Amanda Muse is a 25 y.o. female who presents today for annual GYN exam.  Her last pap smear was performed 2022 and result was NILM.  She reports no history of abnormal pap smears in her past.  She had HIV screening performed 10/17/2024 and it was negative.  She reports menses as regular.  Patient's last menstrual period was 2025 (exact date).  Her general medical history has been reviewed and she reports it as follows:    Past Medical History:   Diagnosis Date    ADHD (attention deficit hyperactivity disorder)     Anxiety     Chlamydia     2024    Migraine     OCD (obsessive compulsive disorder)      Past Surgical History:   Procedure Laterality Date    NO PAST SURGERIES       OB History          0    Para   0    Term   0       0    AB   0    Living   0         SAB   0    IAB   0    Ectopic   0    Multiple   0    Live Births   0               Social History     Tobacco Use    Smoking status: Former     Types: Pipe     Passive exposure: Never    Smokeless tobacco: Never    Tobacco comments:     hookah couple times/month   Vaping Use    Vaping status: Never Used   Substance Use Topics    Alcohol use: Yes     Comment: couple times/month    Drug use: Not Currently     Types: Marijuana     Comment: last used marijuana      Social History     Substance and Sexual Activity   Sexual Activity Yes    Partners: Male    Birth control/protection: OCP     Cancer-related family history is negative for Breast cancer, Colon cancer, Ovarian cancer, and Cancer.    Current Outpatient Medications   Medication Instructions    azelastine (ASTELIN) 0.1 % nasal spray 1 spray, Nasal, 2 times daily, Use in each nostril as directed    cetirizine (ZYRTEC) 10 mg, Oral, Daily    FLUoxetine (PROZAC) 20 mg, Daily    norgestimate-ethinyl estradiol (Joellen) 0.25-35 MG-MCG per tablet 1 tablet, Oral, Daily    rizatriptan (MAXALT) 10 mg, Oral, Once as needed, May repeat in 2 hours  if needed. Max 2/24 hours, 9/month.       Review of Systems:  Review of Systems   Constitutional: Negative.    Gastrointestinal: Negative.    Genitourinary:  Negative for difficulty urinating, menstrual problem, pelvic pain and vaginal discharge.   Skin: Negative.        Physical Exam:  /72 (BP Location: Left arm, Patient Position: Sitting, Cuff Size: Standard)   Wt 54.4 kg (120 lb)   LMP 06/04/2025 (Exact Date)   BMI 21.95 kg/m²   Physical Exam  Constitutional:       General: She is not in acute distress.     Appearance: She is well-developed.   Genitourinary:      Vulva normal.      No lesions in the vagina.        Right Adnexa: not tender and no mass present.     Left Adnexa: not tender and no mass present.     No cervical motion tenderness or lesion.      Uterus is not tender.   Breasts:     Right: No mass, nipple discharge, skin change or tenderness.      Left: No mass, nipple discharge, skin change or tenderness.   Neck:      Thyroid: No thyromegaly.     Cardiovascular:      Rate and Rhythm: Normal rate and regular rhythm.   Pulmonary:      Effort: Pulmonary effort is normal.   Abdominal:      Palpations: Abdomen is soft.      Tenderness: There is no abdominal tenderness.     Musculoskeletal:      Cervical back: Neck supple.     Neurological:      Mental Status: She is alert and oriented to person, place, and time.     Skin:     General: Skin is warm and dry.   Vitals reviewed.       Assessment/Plan:   1. Normal well-woman GYN exam.  2. Cervical cancer screening:  Normal cervical exam.  Pap smear done with HPV reflex.  Has received full HPV vaccine series in the past.   3. STD screening:  Orders placed for vaginal GC/CT, trichomonas/mycoplasma genitalium cultures.  Orders placed for serum anti-HIV, anti-HCV, HbsAg, syphilis panel.   4. Breast cancer screening:  Normal breast exam.  Reviewed breast self-awareness.   5. Depression Screening: Patient's depression screening was assessed with a PHQ-2 score  of 0. Patient with underlying depression and was advised to continue current medications as prescribed.    6. BMI Counseling: Body mass index is 21.95 kg/m².  No intervention indicated.   7. Contraception:  OCP's.  Given Rx refills for another year.   8. Return to office in 1 year for annual GYN exam.    Reviewed with patient that test results [facility], but that they will not necessarily be reviewed by me immediately.  Explained that I will review results at my earliest opportunity and contact patient appropriately.

## 2025-06-06 ENCOUNTER — RESULTS FOLLOW-UP (OUTPATIENT)
Dept: OBGYN CLINIC | Facility: CLINIC | Age: 25
End: 2025-06-06

## 2025-06-06 LAB
HBV SURFACE AG SER QL: NORMAL
HCV AB SER QL: NORMAL
HIV 1+2 AB+HIV1 P24 AG SERPL QL IA: NORMAL
TREPONEMA PALLIDUM IGG+IGM AB [PRESENCE] IN SERUM OR PLASMA BY IMMUNOASSAY: NORMAL

## 2025-06-09 LAB
C TRACH DNA SPEC QL NAA+PROBE: NEGATIVE
M GENITALIUM DNA SPEC QL NAA+PROBE: NEGATIVE
N GONORRHOEA DNA SPEC QL NAA+PROBE: NEGATIVE
T VAGINALIS DNA SPEC QL NAA+PROBE: NEGATIVE

## 2025-06-11 LAB
LAB AP GYN PRIMARY INTERPRETATION: NORMAL
Lab: NORMAL

## 2025-06-26 DIAGNOSIS — G43.009 MIGRAINE WITHOUT AURA AND WITHOUT STATUS MIGRAINOSUS, NOT INTRACTABLE: ICD-10-CM

## 2025-06-26 RX ORDER — RIZATRIPTAN BENZOATE 10 MG/1
TABLET ORAL
Qty: 9 TABLET | Refills: 6 | Status: SHIPPED | OUTPATIENT
Start: 2025-06-26

## 2025-06-26 NOTE — TELEPHONE ENCOUNTER
Called patient to rescheduled cancelled FU appointment; LMOM requesting a call back and My Chart letter sent as well.    Thank you

## 2025-07-18 DIAGNOSIS — G43.009 MIGRAINE WITHOUT AURA AND WITHOUT STATUS MIGRAINOSUS, NOT INTRACTABLE: ICD-10-CM

## 2025-07-18 RX ORDER — RIZATRIPTAN BENZOATE 10 MG/1
TABLET ORAL
Qty: 9 TABLET | Refills: 0 | OUTPATIENT
Start: 2025-07-18

## 2025-07-18 NOTE — TELEPHONE ENCOUNTER
rizatriptan (MAXALT) 10 mg tablet , [id number] [address], was sent to [facility] [address] [place] [address] on 6/26/25 with a qty of 9 tablets with 6 refills    Messaged patient via [employer]

## 2025-07-26 ENCOUNTER — OFFICE VISIT (OUTPATIENT)
Dept: URGENT CARE | Age: 25
End: 2025-07-26

## 2025-07-26 VITALS
DIASTOLIC BLOOD PRESSURE: 62 MMHG | OXYGEN SATURATION: 100 % | TEMPERATURE: 97.5 F | SYSTOLIC BLOOD PRESSURE: 106 MMHG | BODY MASS INDEX: 22.08 KG/M2 | HEART RATE: 74 BPM | WEIGHT: 120 LBS | HEIGHT: 62 IN | RESPIRATION RATE: 20 BRPM

## 2025-07-26 DIAGNOSIS — N89.8 VAGINAL DISCHARGE: Primary | ICD-10-CM

## 2025-07-26 LAB
SL AMB  POCT GLUCOSE, UA: NEGATIVE
SL AMB LEUKOCYTE ESTERASE,UA: NEGATIVE
SL AMB POCT BILIRUBIN,UA: NEGATIVE
SL AMB POCT BLOOD,UA: ABNORMAL
SL AMB POCT CLARITY,UA: CLEAR
SL AMB POCT COLOR,UA: YELLOW
SL AMB POCT KETONES,UA: 40
SL AMB POCT NITRITE,UA: NEGATIVE
SL AMB POCT PH,UA: 6
SL AMB POCT SPECIFIC GRAVITY,UA: 1.01
SL AMB POCT URINE HCG: NEGATIVE
SL AMB POCT URINE PROTEIN: ABNORMAL
SL AMB POCT UROBILINOGEN: 0.2

## 2025-07-26 PROCEDURE — 87563 M. GENITALIUM AMP PROBE: CPT

## 2025-07-26 PROCEDURE — 81025 URINE PREGNANCY TEST: CPT

## 2025-07-26 PROCEDURE — 87491 CHLMYD TRACH DNA AMP PROBE: CPT

## 2025-07-26 PROCEDURE — 99214 OFFICE O/P EST MOD 30 MIN: CPT

## 2025-07-26 PROCEDURE — 96372 THER/PROPH/DIAG INJ SC/IM: CPT

## 2025-07-26 PROCEDURE — 87661 TRICHOMONAS VAGINALIS AMPLIF: CPT

## 2025-07-26 PROCEDURE — 81002 URINALYSIS NONAUTO W/O SCOPE: CPT

## 2025-07-26 PROCEDURE — 87591 N.GONORRHOEAE DNA AMP PROB: CPT

## 2025-07-26 RX ORDER — DOXYCYCLINE 100 MG/1
100 CAPSULE ORAL 2 TIMES DAILY
Qty: 14 CAPSULE | Refills: 0 | Status: SHIPPED | OUTPATIENT
Start: 2025-07-26 | End: 2025-08-02

## 2025-07-26 RX ORDER — DOXYCYCLINE 25 MG/5ML
100 POWDER, FOR SUSPENSION ORAL 2 TIMES DAILY
Qty: 280 ML | Refills: 0 | Status: SHIPPED | OUTPATIENT
Start: 2025-07-26 | End: 2025-07-26 | Stop reason: ALTCHOICE

## 2025-07-26 RX ORDER — CEFTRIAXONE 1 G/1
500 INJECTION, POWDER, FOR SOLUTION INTRAMUSCULAR; INTRAVENOUS ONCE
Status: COMPLETED | OUTPATIENT
Start: 2025-07-26 | End: 2025-07-26

## 2025-07-26 RX ORDER — LIDOCAINE HYDROCHLORIDE 10 MG/ML
0.9 INJECTION, SOLUTION EPIDURAL; INFILTRATION; INTRACAUDAL; PERINEURAL ONCE
Status: COMPLETED | OUTPATIENT
Start: 2025-07-26 | End: 2025-07-26

## 2025-07-26 RX ORDER — CEFTRIAXONE 1 G/1
1000 INJECTION, POWDER, FOR SOLUTION INTRAMUSCULAR; INTRAVENOUS EVERY 24 HOURS
Status: DISCONTINUED | OUTPATIENT
Start: 2025-07-26 | End: 2025-07-26

## 2025-07-26 RX ADMIN — LIDOCAINE HYDROCHLORIDE 0.9 ML: 10 INJECTION, SOLUTION EPIDURAL; INFILTRATION; INTRACAUDAL; PERINEURAL at 11:43

## 2025-07-26 RX ADMIN — CEFTRIAXONE 500 MG: 1 INJECTION, POWDER, FOR SOLUTION INTRAMUSCULAR; INTRAVENOUS at 11:43

## 2025-07-26 NOTE — PATIENT INSTRUCTIONS
Urine sent to the lab; results will appear in your mychart.  Take antibiotic as prescribed. Recommend probiotic use while taking antibiotic.   Avoid direct sunlight with use of doxycyline, reapply SPF 50 at least every 1-2 hours, wear long sleeves, and a wide brimmed hat.  Hydrate.  Follow-up with your GYN as soon as possible.   Follow-up with PCP in 3-5 days. Go to ER if symptoms worsen.

## 2025-07-26 NOTE — PROGRESS NOTES
[facility]  Name: Amanda Muse      : 2000      MRN: [id number]  Encounter Provider: SIL Torres  Encounter Date: 2025   [place] [facility] [place]  :  Assessment & Plan  Vaginal discharge    Orders:    Chlamydia/GC amplified DNA by PCR; Future    Trichomonas vaginalis/Mycoplasma genitalium PCR; Future    POCT urine HCG    POCT urine dip    cefTRIAXone (ROCEPHIN) injection 500 mg    doxycycline monohydrate (MONODOX) 100 mg capsule; Take 1 capsule (100 mg total) by mouth 2 (two) times a day for 7 days      Point-of-care hCG negative.  Urine sent to lab for STI testing.  Patient agreeable to prophylactic treatment.  Rocephin given IM by RN.  No complications.    Patient Instructions  Follow up with PCP in 3-5 days.  Proceed to  ER if symptoms worsen.    If tests are performed, our office will contact you with results only if changes need to made to the care plan discussed with you at the visit. You [facility].    Chief Complaint:   Chief Complaint   Patient presents with    Vaginal Discharge     Started last evening with greenish color vaginal discharge. Due for her period in 2 weeks. Has some pain. Denies foul odor. Denies fevers.      History of Present Illness {?Quick Links Encounters * My Last Note * Last Note in Specialty * Snapshot * Since Last Visit * History :24942}  Patient is a 25-year-old female who presents with vaginal discharge that started last night.  Agreeable to STI testing and prophylactic treatment.  Denies known exposure.  Reports intermittent left lower quadrant pain before her period- due in two weeks.  Denies dysuria.  Reports intermittent vaginal itching but states this does not feel consistent with prior yeast infection.  States she does have a GYN.  Denies fever.     Vaginal Discharge  The patient's primary symptoms include vaginal discharge. Pertinent negatives include no dysuria or  "fever.     {History obtained from(Optional):90998}    Review of Systems   Constitutional:  Negative for fever.   Genitourinary:  Positive for vaginal discharge. Negative for dysuria.   All other systems reviewed and are negative.    Past Medical History   Past Medical History[1]  Past Surgical History[2]  Family History[3]  she reports that she has quit smoking. Her smoking use included pipe. She has never been exposed to tobacco smoke. She has never used smokeless tobacco. She reports current alcohol use. She reports that she does not currently use drugs after having used the following drugs: Marijuana.  Current Outpatient Medications   Medication Instructions    azelastine (ASTELIN) 0.1 % nasal spray 1 spray, Nasal, 2 times daily, Use in each nostril as directed    cetirizine (ZYRTEC) 10 mg, Oral, Daily    FLUoxetine (PROZAC) 20 mg, Daily    norgestimate-ethinyl estradiol (Joellen) 0.25-35 MG-MCG per tablet 1 tablet, Oral, Daily    rizatriptan (MAXALT) 10 mg tablet TAKE 1 TABLET (10 MG TOTAL) BY MOUTH ONCE AS NEEDED FOR MIGRAINE MAY REPEAT IN 2 HOURS IF NEEDED. MAX 2 TABS PER 24 HOURS, 9 TABS PER MONTH.   Allergies[4]     Objective {?Quick Links Trend Vitals * Enter New Vitals * Results Review * Timeline (Adult) * Labs * Imaging * Cardiology * Procedures * Lung Cancer Screening * Surgical eConsent :20569}  /62   Pulse 74   Temp 97.5 °F (36.4 °C) (Tympanic)   Resp 20   Ht 5' 2\" (1.575 m)   Wt 54.4 kg (120 lb)   SpO2 100%   BMI 21.95 kg/m²      Physical Exam    {Administrative / Billing Section (Optional):21506}Portions of the record may have been created with voice recognition software.  Occasional wrong word or \"sound a like\" substitutions may have occurred due to the inherent limitations of voice recognition software.  Read the chart carefully and recognize, using context, where substitutions have occurred.       [1]   Past Medical History:  Diagnosis Date    ADHD (attention deficit hyperactivity " disorder)     Anxiety     Chlamydia     2019, 2024    Migraine     OCD (obsessive compulsive disorder)    [2]   Past Surgical History:  Procedure Laterality Date    NO PAST SURGERIES     [3]   Family History  Problem Relation Name Age of Onset    Migraines Mother      ADD / ADHD Father      Heart disease Maternal Grandmother      Diabetes Maternal Grandmother      Breast cancer Neg Hx      Colon cancer Neg Hx      Ovarian cancer Neg Hx      Cancer Neg Hx     [4]   Allergies  Allergen Reactions    [place] Flavor - Food Allergy Lip Swelling

## 2025-07-26 NOTE — PROGRESS NOTES
[facility]  Name: Amanda Muse      : 2000      MRN: [id number]  Encounter Provider: SIL Torres  Encounter Date: 2025   [facility] [place]  :  Assessment & Plan  Vaginal discharge    Orders:    Chlamydia/GC amplified DNA by PCR; Future    Trichomonas vaginalis/Mycoplasma genitalium PCR; Future    POCT urine HCG    POCT urine dip    doxycycline monohydrate (VIBRAMYCIN) 25 mg/5 mL; Take 20 mL (100 mg total) by mouth in the morning and 20 mL (100 mg total) before bedtime. Do all this for 7 days.    cefTRIAXone (ROCEPHIN) injection 500 mg      Poct hcg negative.    Poct urine dip negative for leukocytes.   Agreeable to STI testing and prophylactic treatment- rocephin given IM in the office. No complications noted.     States she is unable to swallow pills- agreeable to capsule of doxycyline.     Patient Instructions  Urine sent to the lab; results will appear in your mychart.  Take antibiotic as prescribed. Recommend probiotic use while taking antibiotic.   Avoid direct sunlight with use of doxycyline, reapply SPF 50 at least every 1-2 hours, wear long sleeves, and a wide brimmed hat.  Hydrate.  Follow-up with your GYN as soon as possible.   Follow-up with PCP in 3-5 days. Go to ER if symptoms worsen.     Chief Complaint:   Chief Complaint   Patient presents with    Vaginal Discharge     Started last evening with greenish color vaginal discharge. Due for her period in 2 weeks. Has some pain. Denies foul odor. Denies fevers.      History of Present Illness   Patient is a 25-year-old female who presents with vaginal discharge for one day. Reports green in color. Denies dysuria. Denies flank pain. Reports LLQ pain but she normally gets pain before her menstrual cycle. Denies fever. Reports intermittent itching but not consistent with prior yeast infections.     Vaginal Discharge  The patient's primary symptoms include vaginal discharge. Associated  "symptoms include abdominal pain (llq). Pertinent negatives include no diarrhea, dysuria, fever, flank pain, nausea or vomiting.         Review of Systems   Constitutional:  Negative for fever.   Gastrointestinal:  Positive for abdominal pain (llq). Negative for diarrhea, nausea and vomiting.   Genitourinary:  Positive for vaginal discharge. Negative for dysuria, enuresis and flank pain.   All other systems reviewed and are negative.    Past Medical History   Past Medical History[1]  Past Surgical History[2]  Family History[3]  she reports that she has quit smoking. Her smoking use included pipe. She has never been exposed to tobacco smoke. She has never used smokeless tobacco. She reports current alcohol use. She reports that she does not currently use drugs after having used the following drugs: Marijuana.  Current Outpatient Medications   Medication Instructions    azelastine (ASTELIN) 0.1 % nasal spray 1 spray, Nasal, 2 times daily, Use in each nostril as directed    cetirizine (ZYRTEC) 10 mg, Oral, Daily    FLUoxetine (PROZAC) 20 mg, Daily    norgestimate-ethinyl estradiol (Joellen) 0.25-35 MG-MCG per tablet 1 tablet, Oral, Daily    rizatriptan (MAXALT) 10 mg tablet TAKE 1 TABLET (10 MG TOTAL) BY MOUTH ONCE AS NEEDED FOR MIGRAINE MAY REPEAT IN 2 HOURS IF NEEDED. MAX 2 TABS PER 24 HOURS, 9 TABS PER MONTH.   Allergies[4]     Objective   /62   Pulse 74   Temp 97.5 °F (36.4 °C) (Tympanic)   Resp 20   Ht 5' 2\" (1.575 m)   Wt 54.4 kg (120 lb)   SpO2 100%   BMI 21.95 kg/m²      Physical Exam  Vitals and nursing note reviewed.   Constitutional:       General: She is not in acute distress.     Appearance: Normal appearance. She is well-developed. She is not ill-appearing, toxic-appearing or diaphoretic.     Cardiovascular:      Rate and Rhythm: Normal rate.      Pulses: Normal pulses.      Heart sounds: Normal heart sounds, S1 normal and S2 normal.   Pulmonary:      Effort: Pulmonary effort is normal.      " "Breath sounds: Normal breath sounds and air entry.   Abdominal:      General: Abdomen is flat. Bowel sounds are normal.      Palpations: Abdomen is soft.      Tenderness: There is no right CVA tenderness or left CVA tenderness.     Skin:     General: Skin is warm.      Capillary Refill: Capillary refill takes less than 2 seconds.      Findings: No rash.     Neurological:      Mental Status: She is alert.     Psychiatric:         Mood and Affect: Mood normal.         Behavior: Behavior normal.         Thought Content: Thought content normal.         Judgment: Judgment normal.         Portions of the record may have been created with voice recognition software.  Occasional wrong word or \"sound a like\" substitutions may have occurred due to the inherent limitations of voice recognition software.  Read the chart carefully and recognize, using context, where substitutions have occurred.         [1]   Past Medical History:  Diagnosis Date    ADHD (attention deficit hyperactivity disorder)     Anxiety     Chlamydia     2019, 2024    Migraine     OCD (obsessive compulsive disorder)    [2]   Past Surgical History:  Procedure Laterality Date    NO PAST SURGERIES     [3]   Family History  Problem Relation Name Age of Onset    Migraines Mother      ADD / ADHD Father      Heart disease Maternal Grandmother      Diabetes Maternal Grandmother      Breast cancer Neg Hx      Colon cancer Neg Hx      Ovarian cancer Neg Hx      Cancer Neg Hx     [4]   Allergies  Allergen Reactions    [place] Flavor - Food Allergy Lip Swelling     "
